# Patient Record
Sex: FEMALE | Race: WHITE | NOT HISPANIC OR LATINO | Employment: FULL TIME | URBAN - METROPOLITAN AREA
[De-identification: names, ages, dates, MRNs, and addresses within clinical notes are randomized per-mention and may not be internally consistent; named-entity substitution may affect disease eponyms.]

---

## 2016-12-26 LAB
ALT SERPL-CCNC: 62 IU/L (ref 5–35)
AST SERPL-CCNC: 38 U/L (ref 5–34)
CREAT SERPL-MCNC: 0.8 MG/DL (ref 0.5–1.3)

## 2017-01-05 DIAGNOSIS — E03.4 HYPOTHYROIDISM DUE TO ACQUIRED ATROPHY OF THYROID: Primary | ICD-10-CM

## 2017-01-05 RX ORDER — LEVOTHYROXINE SODIUM 137 UG/1
137 TABLET ORAL DAILY
Qty: 30 TABLET | Refills: 1 | Status: SHIPPED | OUTPATIENT
Start: 2017-01-05 | End: 2017-03-03

## 2017-02-08 ENCOUNTER — OFFICE VISIT (OUTPATIENT)
Dept: OBGYN | Facility: CLINIC | Age: 57
End: 2017-02-08
Payer: COMMERCIAL

## 2017-02-08 VITALS
BODY MASS INDEX: 34.99 KG/M2 | DIASTOLIC BLOOD PRESSURE: 88 MMHG | SYSTOLIC BLOOD PRESSURE: 154 MMHG | HEIGHT: 65 IN | WEIGHT: 210 LBS

## 2017-02-08 DIAGNOSIS — B00.9 HSV INFECTION: Primary | ICD-10-CM

## 2017-02-08 PROCEDURE — 86695 HERPES SIMPLEX TYPE 1 TEST: CPT | Performed by: OBSTETRICS & GYNECOLOGY

## 2017-02-08 PROCEDURE — 99202 OFFICE O/P NEW SF 15 MIN: CPT | Performed by: OBSTETRICS & GYNECOLOGY

## 2017-02-08 PROCEDURE — 86696 HERPES SIMPLEX TYPE 2 TEST: CPT | Performed by: OBSTETRICS & GYNECOLOGY

## 2017-02-08 PROCEDURE — 36415 COLL VENOUS BLD VENIPUNCTURE: CPT | Performed by: OBSTETRICS & GYNECOLOGY

## 2017-02-08 PROCEDURE — 87529 HSV DNA AMP PROBE: CPT | Performed by: OBSTETRICS & GYNECOLOGY

## 2017-02-08 RX ORDER — VALACYCLOVIR HYDROCHLORIDE 500 MG/1
500 TABLET, FILM COATED ORAL 2 TIMES DAILY
Qty: 6 TABLET | Refills: 3 | Status: SHIPPED | OUTPATIENT
Start: 2017-02-08 | End: 2017-07-28

## 2017-02-08 ASSESSMENT — ANXIETY QUESTIONNAIRES
2. NOT BEING ABLE TO STOP OR CONTROL WORRYING: NOT AT ALL
GAD7 TOTAL SCORE: 0
6. BECOMING EASILY ANNOYED OR IRRITABLE: NOT AT ALL
1. FEELING NERVOUS, ANXIOUS, OR ON EDGE: NOT AT ALL
5. BEING SO RESTLESS THAT IT IS HARD TO SIT STILL: NOT AT ALL
3. WORRYING TOO MUCH ABOUT DIFFERENT THINGS: NOT AT ALL
IF YOU CHECKED OFF ANY PROBLEMS ON THIS QUESTIONNAIRE, HOW DIFFICULT HAVE THESE PROBLEMS MADE IT FOR YOU TO DO YOUR WORK, TAKE CARE OF THINGS AT HOME, OR GET ALONG WITH OTHER PEOPLE: NOT DIFFICULT AT ALL
7. FEELING AFRAID AS IF SOMETHING AWFUL MIGHT HAPPEN: NOT AT ALL

## 2017-02-08 ASSESSMENT — PATIENT HEALTH QUESTIONNAIRE - PHQ9: 5. POOR APPETITE OR OVEREATING: NOT AT ALL

## 2017-02-08 NOTE — PROGRESS NOTES
SUBJECTIVE:                                                   Cherry Kim is a 56 year old female who presents to clinic today for the following health issue(s):  Patient presents with:  Vaginal Problem: lichen sclerosis, has blister that she noticed 1 week ago        HPI:  Patient has 5 day history of painful blisters left lower vulva  Similar lesion 3 yrs ago   No known history of hsv  Chart review doesn't show hsv test  Classic hsv lesion on exam  Sent pcr and antibodies  Prescription valtrex    Has connective tissue disease    Clobetasol for lichen sclerosis in past doesn't have typical lesions for that right now    Patient's last menstrual period was 2007..   Patient is sexually active, .  Using menopause for contraception.    reports that she has never smoked. She has never used smokeless tobacco.    STD testing offered?  Declined    Health maintenance updated:  yes    Today's PHQ-2 Score:   PHQ-2 (  [employer]) 2016   Q1: Little interest or pleasure in doing things 0   Q2: Feeling down, depressed or hopeless 0   PHQ-2 Score 0     Today's PHQ-9 Score:   PHQ-9 SCORE 2017   Total Score 0     Today's GAIL-7 Score:   GAIL-7 SCORE 2017   Total Score 0       Problem list and histories reviewed & adjusted, as indicated.  Additional history: as documented.    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.0)     Obesity     Other specified disorder of rectum and anus     Hemorrhoids     Health Care Home     Connective tissue disorder (H)     Impaired fasting glucose     Hyperlipidemia LDL goal <130     Hallux abducto valgus     Past Surgical History   Procedure Laterality Date     C nonspecific procedure       Tosillectomy     C nonspecific procedure       rectal abcess     Cryotherapy, cervical       1980s      Hc reconstr nose+tania septal repair              Social History   Substance Use Topics     Smoking status: Never Smoker      Smokeless tobacco: Never Used     Alcohol Use: Yes      Comment: 2-3 times per week      Problem (# of Occurrences) Relation (Name,Age of Onset)    Breast Cancer (1) Mother: At 55 yrs    C.A.D. (1) Mother:  of CHF,  at 58yrs of ? MI    CANCER (6) Father: Lung Cancer, Brother: brain cancer  , Maternal Aunt: vaginal, Maternal Aunt: cervical, Maternal Aunt: Rectal cancer, Maternal Grandfather: Stomach cancer in his 40s    Connective Tissue Disorder (1) Mother: Lupus    DIABETES (1) Mother: Mid forties    Hypertension (1) Mother    Respiratory (2) Father: d. 79 from complications of pneumonia, Brother: COPD- Lung transplant               Current Outpatient Prescriptions   Medication Sig     valACYclovir (VALTREX) 500 MG tablet Take 1 tablet (500 mg) by mouth 2 times daily     levothyroxine (SYNTHROID/LEVOTHROID) 137 MCG tablet Take 1 tablet (137 mcg) by mouth daily     hydrochlorothiazide (HYDRODIURIL) 25 MG tablet Take 1 tablet (25 mg) by mouth every morning     simvastatin (ZOCOR) 40 MG tablet Take 1 tablet (40 mg) by mouth At Bedtime     metoprolol (LOPRESSOR) 50 MG tablet Take 1 tablet (50 mg) by mouth 2 times daily     clobetasol (TEMOVATE) 0.05 % cream Apply sparingly to affected area twice daily for 14 days.  Do not apply to face.     lidocaine (XYLOCAINE) 5 % ointment      Vaginal Lubricant (REPLENS) GEL Place vaginally as needed     diclofenac (VOLTAREN) 75 MG EC tablet Take 75 mg by mouth. Takes prn     omeprazole 20 MG tablet Take 1 tablet by mouth daily. Take 30-60 minutes before a meal.     Iodoquinol-HC (HYDROCORTISONE-IODOQUINOL) 1-1 % CREA Externally apply  topically daily as needed.     HYDROXYCHLOROQUINE SULFATE 200 MG PO TABS 2 TABLET DAILY     CO Q-10 100 MG OR CAPS 1 daily     ASPIRIN 81 MG OR TABS ONE DAILY     EPINEPHrine (EPIPEN 2-ADRIAN) 0.3 MG/0.3ML  "injection Inject 0.3 mLs (0.3 mg) into the muscle once as needed for anaphylaxis     No current facility-administered medications for this visit.     Allergies   Allergen Reactions     Ciprofloxacin Diarrhea     Codeine      severe nausea     Lisinopril Anaphylaxis     angioedema     Shellfish Allergy Nausea and Vomiting     Hives on neck     Sulfa Drugs      hives       ROS:  Genitourinary: Painful [place], Painful Urination, Vaginal Dryness, Vaginal Itching and New skin lesions    OBJECTIVE:     /88 mmHg  Ht 5' 4.5\" (1.638 m)  Wt 210 lb (95.255 kg)  BMI 35.50 kg/m2  LMP 06/27/2007  Body mass index is 35.5 kg/(m^2).    Exam:  Constitutional:  Appearance: Well nourished, well developed alert, in no acute distress  Neurologic/Psychiatric:  Mental Status:  Oriented X3   Pelvic Exam:  External Genitalia:     Normal appearance for age, no discharge present, no tenderness present, no inflammatory lesions present, color normal  Vagina:     Normal vaginal vault without central or paravaginal defects, no discharge present, no inflammatory lesions present, no masses present  Bladder:     Nontender to palpation  Urethra:   Urethral Body:  Urethra palpation normal, urethra structural support normal   Urethral Meatus:  No erythema or lesions present  Cervix:     Appearance healthy, no lesions present, nontender to palpation, no bleeding present  Uterus:     Nontender to palpation, no masses present, position anteflexed, mobility: normal  Adnexa:     No adnexal tenderness present, no adnexal masses present  Perineum:     Perineum within normal limits, no evidence of trauma, no rashes or skin lesions present  Anus:     Anus within normal limits, no hemorrhoids present  Inguinal Lymph Nodes:     No lymphadenopathy present  Pubic Hair:     Normal pubic hair distribution for age  Genitalia and Groin:     No rashes present, no lesions present, no areas of discoloration, no masses present     In-Clinic Test Results:  No " results found for this or any previous visit (from the past 24 hour(s)).    ASSESSMENT/PLAN:                                                        ICD-10-CM    1. HSV infection B00.9 valACYclovir (VALTREX) 500 MG tablet       There are no Patient Instructions on file for this visit.    Valtrex prescription  Time 20 minute  Face to face   Discussed herpes and testing    Romina Lopes MD  [facility]

## 2017-02-09 ASSESSMENT — ANXIETY QUESTIONNAIRES: GAD7 TOTAL SCORE: 0

## 2017-02-09 ASSESSMENT — PATIENT HEALTH QUESTIONNAIRE - PHQ9: SUM OF ALL RESPONSES TO PHQ QUESTIONS 1-9: 0

## 2017-02-10 LAB
HSV1 DNA SPEC QL NAA+PROBE: NEGATIVE
HSV2 DNA SPEC QL NAA+PROBE: ABNORMAL
SPECIMEN SOURCE: ABNORMAL

## 2017-02-13 LAB
HSV1 IGG SERPL QL IA: 0.2 AI (ref 0–0.8)
HSV2 IGG SERPL QL IA: ABNORMAL AI (ref 0–0.8)

## 2017-03-03 DIAGNOSIS — E03.4 HYPOTHYROIDISM DUE TO ACQUIRED ATROPHY OF THYROID: ICD-10-CM

## 2017-03-03 RX ORDER — LEVOTHYROXINE SODIUM 137 UG/1
137 TABLET ORAL DAILY
Qty: 30 TABLET | Refills: 8 | Status: SHIPPED | OUTPATIENT
Start: 2017-03-03 | End: 2017-04-14

## 2017-03-03 NOTE — TELEPHONE ENCOUNTER
levothyroxin 137mcg     Last Written Prescription Date: 01/05/17  Last Quantity: 30, # refills: 1  Last Office Visit with G, P or [facility] prescribing provider: 11/29/16        TSH   Date Value Ref Range Status   12/10/2016 0.62 0.40 - 4.00 mU/L Final

## 2017-03-12 DIAGNOSIS — I10 ESSENTIAL HYPERTENSION, BENIGN: ICD-10-CM

## 2017-03-12 NOTE — TELEPHONE ENCOUNTER
metoprolol (LOPRESSOR) 50 MG tablet      Last Written Prescription Date: 09/12/16  Last Fill Quantity: 180 tab, # refills: 1    Last Office Visit with [facility], [facility] or [facility] prescribing provider:  02/04/16   Future Office Visit:        BP Readings from Last 3 Encounters:   02/08/17 154/88   02/04/16 130/84   01/15/15 122/80

## 2017-03-13 RX ORDER — METOPROLOL TARTRATE 50 MG
50 TABLET ORAL 2 TIMES DAILY
Qty: 60 TABLET | Refills: 0 | Status: SHIPPED | OUTPATIENT
Start: 2017-03-13 | End: 2017-04-14

## 2017-04-14 ENCOUNTER — OFFICE VISIT (OUTPATIENT)
Dept: INTERNAL MEDICINE | Facility: CLINIC | Age: 57
End: 2017-04-14
Payer: COMMERCIAL

## 2017-04-14 VITALS
HEART RATE: 73 BPM | SYSTOLIC BLOOD PRESSURE: 122 MMHG | OXYGEN SATURATION: 96 % | BODY MASS INDEX: 35.44 KG/M2 | DIASTOLIC BLOOD PRESSURE: 84 MMHG | WEIGHT: 209.7 LBS | TEMPERATURE: 97.8 F

## 2017-04-14 DIAGNOSIS — Z91.013 CRUSTACEAN ALLERGY: ICD-10-CM

## 2017-04-14 DIAGNOSIS — I10 ESSENTIAL HYPERTENSION, BENIGN: Primary | ICD-10-CM

## 2017-04-14 DIAGNOSIS — R80.9 PROTEINURIA: ICD-10-CM

## 2017-04-14 DIAGNOSIS — Z71.89 ADVANCED DIRECTIVES, COUNSELING/DISCUSSION: Chronic | ICD-10-CM

## 2017-04-14 DIAGNOSIS — E03.4 HYPOTHYROIDISM DUE TO ACQUIRED ATROPHY OF THYROID: ICD-10-CM

## 2017-04-14 DIAGNOSIS — Z11.59 NEED FOR HEPATITIS C SCREENING TEST: ICD-10-CM

## 2017-04-14 DIAGNOSIS — Z12.31 VISIT FOR SCREENING MAMMOGRAM: ICD-10-CM

## 2017-04-14 DIAGNOSIS — E78.5 HYPERLIPIDEMIA LDL GOAL <130: ICD-10-CM

## 2017-04-14 LAB
CHOLEST SERPL-MCNC: 153 MG/DL
HDLC SERPL-MCNC: 51 MG/DL
LDLC SERPL CALC-MCNC: 87 MG/DL
NONHDLC SERPL-MCNC: 102 MG/DL
PROT UR-MCNC: 0.09 G/L
PROT/CREAT 24H UR: 0.33 G/G CR (ref 0–0.2)
TRIGL SERPL-MCNC: 75 MG/DL

## 2017-04-14 PROCEDURE — 80061 LIPID PANEL: CPT | Performed by: INTERNAL MEDICINE

## 2017-04-14 PROCEDURE — 36415 COLL VENOUS BLD VENIPUNCTURE: CPT | Performed by: INTERNAL MEDICINE

## 2017-04-14 PROCEDURE — 99214 OFFICE O/P EST MOD 30 MIN: CPT | Performed by: INTERNAL MEDICINE

## 2017-04-14 PROCEDURE — 84156 ASSAY OF PROTEIN URINE: CPT | Performed by: INTERNAL MEDICINE

## 2017-04-14 RX ORDER — EPINEPHRINE 0.3 MG/.3ML
0.3 INJECTION SUBCUTANEOUS PRN
Qty: 0.6 ML | Refills: 11 | Status: SHIPPED | OUTPATIENT
Start: 2017-04-14 | End: 2019-09-19

## 2017-04-14 NOTE — LETTER
[address] [address]  [phone]        November 3, 2017    Cherry Kim  [address] [place] [id number]              Dear Cherry Kim    This is to remind you that your NON-FASTING LABS are due.    You may call our office at [phone] to schedule an appointment.    Please disregard this notice if you have already had your labs drawn or made an appointment.        Sincerely,        Krish Shannon MD

## 2017-04-14 NOTE — PROGRESS NOTES
SUBJECTIVE:                                                    Cherry Kim is a 56 year old female who presents to clinic today for the following health issues:    Hypertension Follow-up  BP Readings from Last 3 Encounters:   04/14/17 122/84   02/08/17 154/88   02/04/16 130/84        Outpatient blood pressures are not being checked.    Low Salt Diet: no added salt       Amount of exercise or physical activity: None    Problems taking medications regularly: No    Medication side effects: none    Diet: regular (no restrictions)    Hyperlipidemia Follow-Up  Lab Results   Component Value Date    HDL 54 02/04/2016     02/04/2016    CHOL 183 02/04/2016    TRIG 103 02/04/2016          Rate your low fat/cholesterol diet?: good    Taking statin?  Yes, no muscle aches from statin    Other lipid medications/supplements?:  Fish oil/Omega 3, dose 1000 mg without side effects     Hypothyroidism Follow-up  TSH   Date Value Ref Range Status   12/10/2016 0.62 0.40 - 4.00 mU/L Final   ]     Since last visit, patient describes the following symptoms: Weight stable, no hair loss, no skin changes, no constipation, no loose stools       Amount of exercise or physical activity: None    Problems taking medications regularly: No    Medication side effects: none    Diet: regular (no restrictions)    Problem list and histories reviewed & adjusted, as indicated.  Additional history: as documented    Problem list, Medication list, Allergies, and Medical/Social/Surgical histories reviewed in [facility] and updated as appropriate.    ROS:  Constitutional, HEENT, cardiovascular, pulmonary, gi and gu systems are negative, except as otherwise noted.    OBJECTIVE:                                                    /84  Pulse 73  Temp 97.8  F (36.6  C) (Oral)  Wt 209 lb 11.2 oz (95.1 kg)  LMP 06/27/2007  SpO2 96%  BMI 35.44 kg/m2  Body mass index is 35.44 kg/(m^2).  GENERAL APPEARANCE: alert, no distress and over  weight  HENT: nose and mouth without ulcers or lesions  NECK: no adenopathy, no asymmetry, masses, or scars and thyroid normal to palpation  RESP: lungs clear to auscultation - no rales, rhonchi or wheezes  CV: regular rates and rhythm, normal S1 S2, no S3 or S4 and no murmur, click or rub    Diagnostic test results:  Results for orders placed or performed in visit on 04/14/17   Lipid Profile with reflex to direct LDL   Result Value Ref Range    Cholesterol 153 <200 mg/dL    Triglycerides 75 <150 mg/dL    HDL Cholesterol 51 >49 mg/dL    LDL Cholesterol Calculated 87 <100 mg/dL    Non HDL Cholesterol 102 <130 mg/dL   Protein  random urine   Result Value Ref Range    Protein Random Urine 0.09 g/L    Protein Total Urine g/gr Creatinine 0.33 (H) 0 - 0.2 g/g Cr         ASSESSMENT/PLAN:                                                    1. Essential hypertension, benign  - metoprolol (LOPRESSOR) 50 MG tablet; Take 1 tablet (50 mg) by mouth 2 times daily  Dispense: 180 tablet; Refill: o  - Microalbumin quantitative, random urine  - Basic metabolic panel  - TSH with free T4 reflex  - Lipid Profile with reflex to direct LDL  - ALT  - T4 free    2. Hypothyroidism due to acquired atrophy of thyroid  At goal -continue present dose       3. Hyperlipidemia LDL goal <130  On statin - continue    4. Proteinuria  Present but stable. Has had angioedema from ACEi - so using these are not an option. GFR remains normal so focus will be on maintaining excellent BP control      Follow up with Provider - 6 mo      Krish Shannon MD  [facility]

## 2017-04-14 NOTE — MR AVS SNAPSHOT
After Visit Summary   4/14/2017    Cherry Kim    MRN: [id number]           Patient Information     Date Of Birth          1960        Visit Information        Provider Department      4/14/2017 10:20 AM Krish Shannon MD [facility]        Today's Diagnoses     Crustacean allergy    -  1    Advanced directives, counseling/discussion        Visit for screening mammogram        Hyperlipidemia LDL goal <130        Essential hypertension, benign           Follow-ups after your visit        Future tests that were ordered for you today     Open Future Orders        Priority Expected Expires Ordered    MA Screening Digital Bilateral Routine  4/14/2018 4/14/2017            Who to contact     If you have questions or need follow up information about today's clinic visit or your schedule please contact [facility] directly at [phone].  Normal or non-critical lab and imaging results will be communicated to you by MyChart, letter or phone within 4 business days after the clinic has received the results. If you do not hear from us within 7 days, please contact the clinic through [employer] or phone. If you have a critical or abnormal lab result, we will notify you by phone as soon as possible.  Submit refill requests through [employer] or call your pharmacy and they will forward the refill request to us. Please allow 3 business days for your refill to be completed.          Additional Information About Your Visit        MyChart Information     [employer] gives you secure access to your electronic health record. If you see a primary care provider, you can also send messages to your care team and make appointments. If you have questions, please call your primary care clinic.  If you do not have a primary care provider, please call [phone] and they will assist you.        Care EveryWhere ID     This is your Care EveryWhere ID. This could be  used by other organizations to access your [facility] medical records  [id number]        Your Vitals Were     Pulse Temperature Last Period Pulse Oximetry BMI (Body Mass Index)       73 97.8  F (36.6  C) (Oral) 06/27/2007 96% 35.44 kg/m2        Blood Pressure from Last 3 Encounters:   04/14/17 122/84   02/08/17 154/88   02/04/16 130/84    Weight from Last 3 Encounters:   04/14/17 209 lb 11.2 oz (95.1 kg)   02/08/17 210 lb (95.3 kg)   02/04/16 207 lb 1.6 oz (93.9 kg)              We Performed the Following     Lipid Profile with reflex to direct LDL          Today's Medication Changes          These changes are accurate as of: 4/14/17 10:49 AM.  If you have any questions, ask your nurse or doctor.               Start taking these medicines.        Dose/Directions    EPINEPHrine 0.3 MG/0.3ML injection   Used for:  Crustacean allergy   Replaces:  EPINEPHrine 0.3 MG/0.3ML injection   Started by:  Krish Shannon MD        Dose:  0.3 mg   Inject 0.3 mLs (0.3 mg) into the muscle as needed   Quantity:  0.6 mL   Refills:  11         These medicines have changed or have updated prescriptions.        Dose/Directions    valACYclovir 500 MG tablet   Commonly known as:  VALTREX   This may have changed:    - when to take this  - reasons to take this   Used for:  HSV infection        Dose:  500 mg   Take 1 tablet (500 mg) by mouth 2 times daily   Quantity:  6 tablet   Refills:  3         Stop taking these medicines if you haven't already. Please contact your care team if you have questions.     aspirin 81 MG tablet   Stopped by:  Krish Shannon MD           EPINEPHrine 0.3 MG/0.3ML injection   Commonly known as:  EPIPEN 2-ADRIAN   Replaced by:  EPINEPHrine 0.3 MG/0.3ML injection   Stopped by:  Krish Shannon MD           omeprazole 20 MG tablet   Stopped by:  Krish Shannon MD                Where to get your medicines      These medications were sent to [facility] [address] [place] [address]  [place]  [address] [facility] [address]     Phone:  [phone]     EPINEPHrine 0.3 MG/0.3ML injection                Primary Care Provider Office Phone # Fax #    Krish Shannon [phone]       [facility] [address]  [facility] [address]        Thank you!     Thank you for choosing [facility]  for your care. Our goal is always to provide you with excellent care. Hearing back from our patients is one way we can continue to improve our services. Please take a few minutes to complete the written survey that you may receive in the mail after your visit with us. Thank you!             Your Updated Medication List - Protect others around you: Learn how to safely use, store and throw away your medicines at www.disposemymeds.org.          This list is accurate as of: 4/14/17 10:49 AM.  Always use your most recent med list.                   Brand Name Dispense Instructions for use    clobetasol 0.05 % cream    TEMOVATE    30 g    Apply sparingly to affected area twice daily for 14 days.  Do not apply to face.       coenzyme Q-10 100 MG Caps      1 daily       diclofenac 75 MG EC tablet    VOLTAREN     Take 75 mg by mouth. Takes prn       EPINEPHrine 0.3 MG/0.3ML injection     0.6 mL    Inject 0.3 mLs (0.3 mg) into the muscle as needed       hydrochlorothiazide 25 MG tablet    HYDRODIURIL    90 tablet    Take 1 tablet (25 mg) by mouth every morning       Hydrocortisone-Iodoquinol 1-1 % Crea      Externally apply  topically daily as needed.       hydroxychloroquine 200 MG tablet    PLAQUENIL     2 TABLET DAILY       levothyroxine 137 MCG tablet    SYNTHROID/LEVOTHROID    30 tablet    Take 1 tablet (137 mcg) by mouth daily       lidocaine 5 % ointment    XYLOCAINE         metoprolol 50 MG tablet    LOPRESSOR    60 tablet    Take 1 tablet (50 mg) by mouth 2 times daily       replens Gel      Place vaginally as needed       simvastatin 40 MG tablet     ZOCOR    90 tablet    Take 1 tablet (40 mg) by mouth At Bedtime       valACYclovir 500 MG tablet    VALTREX    6 tablet    Take 1 tablet (500 mg) by mouth 2 times daily

## 2017-04-14 NOTE — NURSING NOTE
"Chief Complaint   Patient presents with     Hypertension       Initial /84  Pulse 73  Temp 97.8  F (36.6  C) (Oral)  Wt 209 lb 11.2 oz (95.1 kg)  LMP 06/27/2007  SpO2 96%  BMI 35.44 kg/m2 Estimated body mass index is 35.44 kg/(m^2) as calculated from the following:    Height as of 2/8/17: 5' 4.5\" (1.638 m).    Weight as of this encounter: 209 lb 11.2 oz (95.1 kg).  Medication Reconciliation: complete    "

## 2017-04-16 RX ORDER — SIMVASTATIN 40 MG
40 TABLET ORAL AT BEDTIME
Qty: 90 TABLET | Refills: 3 | Status: SHIPPED | OUTPATIENT
Start: 2017-04-16 | End: 2018-05-09

## 2017-04-16 RX ORDER — METOPROLOL TARTRATE 50 MG
50 TABLET ORAL 2 TIMES DAILY
Qty: 180 TABLET | Refills: 3 | Status: SHIPPED | OUTPATIENT
Start: 2017-04-16 | End: 2018-05-09

## 2017-04-16 RX ORDER — LEVOTHYROXINE SODIUM 137 UG/1
137 TABLET ORAL DAILY
Qty: 90 TABLET | Refills: 1 | Status: SHIPPED | OUTPATIENT
Start: 2017-04-16 | End: 2018-05-09

## 2017-04-16 RX ORDER — HYDROCHLOROTHIAZIDE 25 MG/1
25 TABLET ORAL EVERY MORNING
Qty: 90 TABLET | Refills: 3 | Status: SHIPPED | OUTPATIENT
Start: 2017-04-16 | End: 2018-05-09

## 2017-04-20 ENCOUNTER — TRANSFERRED RECORDS (OUTPATIENT)
Dept: HEALTH INFORMATION MANAGEMENT | Facility: CLINIC | Age: 57
End: 2017-04-20

## 2017-04-21 DIAGNOSIS — I10 ESSENTIAL HYPERTENSION, BENIGN: ICD-10-CM

## 2017-04-21 DIAGNOSIS — E78.5 HYPERLIPIDEMIA LDL GOAL <130: ICD-10-CM

## 2017-04-24 RX ORDER — HYDROCHLOROTHIAZIDE 25 MG/1
TABLET ORAL
Qty: 90 TABLET | Refills: 3 | Status: SHIPPED | OUTPATIENT
Start: 2017-04-24 | End: 2018-05-09

## 2017-04-24 RX ORDER — SIMVASTATIN 40 MG
TABLET ORAL
Qty: 90 TABLET | Refills: 3 | Status: SHIPPED | OUTPATIENT
Start: 2017-04-24 | End: 2018-05-09

## 2017-05-31 ENCOUNTER — HOSPITAL ENCOUNTER (OUTPATIENT)
Dept: MAMMOGRAPHY | Facility: CLINIC | Age: 57
Discharge: HOME | End: 2017-05-31
Admitting: RADIOLOGY
Payer: COMMERCIAL

## 2017-05-31 DIAGNOSIS — Z12.31 VISIT FOR SCREENING MAMMOGRAM: ICD-10-CM

## 2017-05-31 PROCEDURE — G0202 SCR MAMMO BI INCL CAD: HCPCS

## 2017-07-22 ENCOUNTER — HEALTH MAINTENANCE LETTER (OUTPATIENT)
Age: 57
End: 2017-07-22

## 2017-07-28 DIAGNOSIS — B00.9 HSV INFECTION: ICD-10-CM

## 2017-07-28 RX ORDER — VALACYCLOVIR HYDROCHLORIDE 500 MG/1
TABLET, FILM COATED ORAL
Qty: 6 TABLET | Refills: 0 | Status: SHIPPED | OUTPATIENT
Start: 2017-07-28 | End: 2017-09-17

## 2017-07-28 NOTE — TELEPHONE ENCOUNTER
Pending Prescriptions:                       Disp   Refills    valACYclovir (VALTREX) 500 MG tablet [Pha*6 tabl*0            Sig: TAKE 1 TABLET BY MOUTH TWICE DAILY         Last Written Prescription Date: 2/8/17  Last Fill Quantity: 6, # refills: 3  Last Office Visit with [facility], [facility] or [facility] prescribing provider: 2/8/17  Next appointment: none        Creatinine   Date Value Ref Range Status   12/29/2016 0.800 0.500 - 1.300 mg/dL Final

## 2017-07-28 NOTE — TELEPHONE ENCOUNTER
Pending Prescriptions:                       Disp   Refills    valACYclovir (VALTREX) 500 MG tablet [Pha*6 tabl*0            Sig: TAKE 1 TABLET BY MOUTH TWICE DAILY       Last Written Prescription Date: 2/8/17  Last Fill Quantity: 6, # refills: 3  Last Office Visit with LEISA, P or [facility] prescribing provider: 2/8/17    RTC: None    Creatinine   Date Value Ref Range Status   12/29/2016 0.800 0.500 - 1.300 mg/dL Final

## 2017-09-17 ENCOUNTER — TELEPHONE (OUTPATIENT)
Dept: OBGYN | Facility: CLINIC | Age: 57
End: 2017-09-17

## 2017-09-17 DIAGNOSIS — B00.9 HSV INFECTION: ICD-10-CM

## 2017-09-18 RX ORDER — VALACYCLOVIR HYDROCHLORIDE 500 MG/1
TABLET, FILM COATED ORAL
Qty: 90 TABLET | Refills: 3 | Status: SHIPPED | OUTPATIENT
Start: 2017-09-18 | End: 2018-07-31

## 2017-09-18 NOTE — TELEPHONE ENCOUNTER
Ok to start supression 500mg po daily since frequent outbreaks  She already tolerates this med so I don't expect any different side effects with daily use

## 2017-09-18 NOTE — TELEPHONE ENCOUNTER
valACYclovir (VALTREX) 500 MG tablet  Last Written Prescription Date:  7/28/17  Last Fill Quantity: 6,   # refills: 0  Last Office Visit with G primary care provider:  2/8/17  Future Office visit: none    Routing refill request to provider for review/approval because:  LMTCB to discuss with pt how often she needs to take med.

## 2017-09-18 NOTE — TELEPHONE ENCOUNTER
"Called pt and informed her of Dr. Lopes's message below and message with Dr. Lopes in person (Dr. Lopes indicated pt may get a headache, daily suppression helps keep the virus dormin/hidden). Pt is wanting to do daily suppression states she hates feeling like this. Asked her if she was referring to the outbreaks and she said \"Yes\" and that she is \"uncomfortable.\" States everything is fine emotionally. Informed pt to continue this episodic treatment and once this outbreak is gone to start taking daily. Pt voiced understanding of information. Informed that if we are prescribing Rx's for a pt we have to see them yearly meaning that she is due for appt in February. Pt voiced understanding. Dr. Lopes sent Rx to pharmacy for 90 tabs/3rf.      Closing encounter.  "

## 2017-09-18 NOTE — TELEPHONE ENCOUNTER
Called pt she states that she uses the Valtrex 500 mg BID for 3 days when she starts to feel a genital herpes outbreak coming. Pt gets about 1-2 outbreaks a month. Pt thinks she waited too long before starting the Valtrex for this current outbreak as she has blisters. She had four pills left and took her last pill this morning and is now out. Informed pt about daily suppression therapy. Pt wants to know what the drawbacks/adverse effects would be of her taking the Valtrex daily?   Routing to Dr. Lopes to review/advise.

## 2017-09-30 ENCOUNTER — HEALTH MAINTENANCE LETTER (OUTPATIENT)
Age: 57
End: 2017-09-30

## 2017-10-26 ENCOUNTER — TRANSFERRED RECORDS (OUTPATIENT)
Dept: HEALTH INFORMATION MANAGEMENT | Facility: CLINIC | Age: 57
End: 2017-10-26

## 2017-10-26 LAB
ALT SERPL-CCNC: 41 IU/L (ref 5–35)
AST SERPL-CCNC: 30 U/L (ref 5–34)
CREAT SERPL-MCNC: 0.58 MG/DL (ref 0.5–1.3)
GFR SERPL CREATININE-BSD FRML MDRD: 114.3 ML/MIN/1.73M2

## 2018-03-11 DIAGNOSIS — E03.4 HYPOTHYROIDISM DUE TO ACQUIRED ATROPHY OF THYROID: ICD-10-CM

## 2018-03-12 NOTE — TELEPHONE ENCOUNTER
Routing refill request to provider for review/approval because:  Labs not current:  TSH from 2016

## 2018-03-12 NOTE — TELEPHONE ENCOUNTER
"Requested Prescriptions   Pending Prescriptions Disp Refills     levothyroxine (SYNTHROID/LEVOTHROID) 137 MCG tablet [Pharmacy Med Name: LEVOTHYROXIN 137MCG TAB]  Last Written Prescription Date:  4/16/17  Last Fill Quantity: 90,  # refills: 1   Last office visit: 4/14/2017 with prescribing provider:  4/14/17   Future Office Visit:       30 tablet 8     Sig: TAKE ONE TABLET BY MOUTH ONCE DAILY    Thyroid Protocol Failed    3/11/2018 12:50 PM       Failed - Normal TSH on file in past 12 months    Recent Labs   Lab Test  12/10/16   1153   TSH  0.62             Passed - Patient is 12 years or older       Passed - Recent (12 mo) or future (30 days) visit within the authorizing provider's specialty    Patient had office visit in the last 12 months or has a visit in the next 30 days with authorizing provider or within the authorizing provider's specialty.  See \"Patient Info\" tab in inbasket, or \"Choose Columns\" in Meds & Orders section of the refill encounter.           Passed - No active pregnancy on record    If patient is pregnant or has had a positive pregnancy test, please check TSH.         Passed - No positive pregnancy test in past 12 months    If patient is pregnant or has had a positive pregnancy test, please check TSH.          "

## 2018-03-13 RX ORDER — LEVOTHYROXINE SODIUM 137 UG/1
137 TABLET ORAL DAILY
Qty: 30 TABLET | Refills: 0 | Status: SHIPPED | OUTPATIENT
Start: 2018-03-13 | End: 2018-04-08

## 2018-03-13 NOTE — TELEPHONE ENCOUNTER
1 month prescription sent electronically to the specified pharmacy.    Due for offic visit and labs April 2018

## 2018-04-08 DIAGNOSIS — E03.4 HYPOTHYROIDISM DUE TO ACQUIRED ATROPHY OF THYROID: ICD-10-CM

## 2018-04-09 NOTE — TELEPHONE ENCOUNTER
"Requested Prescriptions   Pending Prescriptions Disp Refills     levothyroxine (SYNTHROID/LEVOTHROID) 137 MCG tablet [Pharmacy Med Name: LEVOTHYROXIN 137MCG TAB] 30 tablet 0      Last Written Prescription Date:  03/13/18  Last Fill Quantity: 30,  # refills: 0   Last office visit: 4/14/2017 with prescribing provider:  04/14/17   Future Office Visit: 04/12/18  Next 5 appointments (look out 90 days)     Apr 12, 2018  8:00 AM CDT   [facility]Chart [facility] with Krish Shannon [facility]    [address] [id number] [phone]                Sig: TAKE 1 TABLET BY MOUTH ONCE DAILY    Thyroid Protocol Failed    4/8/2018  3:30 PM       Failed - Normal TSH on file in past 12 months    Recent Labs   Lab Test  12/10/16   1153   TSH  0.62             Passed - Patient is 12 years or older       Passed - Recent (12 mo) or future (30 days) visit within the authorizing provider's specialty    Patient had office visit in the last 12 months or has a visit in the next 30 days with authorizing provider or within the authorizing provider's specialty.  See \"Patient Info\" tab in inbasket, or \"Choose Columns\" in Meds & Orders section of the refill encounter.           Passed - No active pregnancy on record    If patient is pregnant or has had a positive pregnancy test, please check TSH.         Passed - No positive pregnancy test in past 12 months    If patient is pregnant or has had a positive pregnancy test, please check TSH.          "

## 2018-04-10 RX ORDER — LEVOTHYROXINE SODIUM 137 UG/1
TABLET ORAL
Qty: 30 TABLET | Refills: 0 | Status: SHIPPED | OUTPATIENT
Start: 2018-04-10 | End: 2018-05-09

## 2018-04-10 NOTE — TELEPHONE ENCOUNTER
Medication is being filled for 1 time refill only due to:  Patient needs to be seen because it has been more than one year since last visit.  Upcoming appt

## 2018-05-01 ENCOUNTER — TRANSFERRED RECORDS (OUTPATIENT)
Dept: HEALTH INFORMATION MANAGEMENT | Facility: CLINIC | Age: 58
End: 2018-05-01

## 2018-05-02 ENCOUNTER — TRANSFERRED RECORDS (OUTPATIENT)
Dept: HEALTH INFORMATION MANAGEMENT | Facility: CLINIC | Age: 58
End: 2018-05-02

## 2018-05-02 LAB
ALT SERPL-CCNC: 50 IU/L (ref 5–35)
AST SERPL-CCNC: 31 U/L (ref 5–34)
CREAT SERPL-MCNC: 0.62 MG/DL (ref 0.5–1.3)
GFR SERPL CREATININE-BSD FRML MDRD: 105.8 ML/MIN/1.73M2

## 2018-05-09 ENCOUNTER — MEDICAL CORRESPONDENCE (OUTPATIENT)
Dept: HEALTH INFORMATION MANAGEMENT | Facility: CLINIC | Age: 58
End: 2018-05-09

## 2018-05-09 ENCOUNTER — OFFICE VISIT (OUTPATIENT)
Dept: INTERNAL MEDICINE | Facility: CLINIC | Age: 58
End: 2018-05-09
Payer: COMMERCIAL

## 2018-05-09 VITALS
BODY MASS INDEX: 34.89 KG/M2 | DIASTOLIC BLOOD PRESSURE: 84 MMHG | HEART RATE: 62 BPM | RESPIRATION RATE: 16 BRPM | TEMPERATURE: 98 F | HEIGHT: 65 IN | WEIGHT: 209.4 LBS | SYSTOLIC BLOOD PRESSURE: 140 MMHG | OXYGEN SATURATION: 97 %

## 2018-05-09 DIAGNOSIS — E78.5 HYPERLIPIDEMIA LDL GOAL <130: ICD-10-CM

## 2018-05-09 DIAGNOSIS — R80.1 PERSISTENT PROTEINURIA: ICD-10-CM

## 2018-05-09 DIAGNOSIS — E03.4 HYPOTHYROIDISM DUE TO ACQUIRED ATROPHY OF THYROID: ICD-10-CM

## 2018-05-09 DIAGNOSIS — I10 ESSENTIAL HYPERTENSION, BENIGN: Primary | ICD-10-CM

## 2018-05-09 LAB
ANION GAP SERPL CALCULATED.3IONS-SCNC: 5 MMOL/L (ref 3–14)
BUN SERPL-MCNC: 14 MG/DL (ref 7–30)
CALCIUM SERPL-MCNC: 10.2 MG/DL (ref 8.5–10.1)
CHLORIDE SERPL-SCNC: 104 MMOL/L (ref 94–109)
CHOLEST SERPL-MCNC: 157 MG/DL
CO2 SERPL-SCNC: 32 MMOL/L (ref 20–32)
CREAT SERPL-MCNC: 0.68 MG/DL (ref 0.52–1.04)
GFR SERPL CREATININE-BSD FRML MDRD: 89 ML/MIN/1.7M2
GLUCOSE SERPL-MCNC: 104 MG/DL (ref 70–99)
HDLC SERPL-MCNC: 48 MG/DL
LDLC SERPL CALC-MCNC: 98 MG/DL
NONHDLC SERPL-MCNC: 109 MG/DL
POTASSIUM SERPL-SCNC: 4.1 MMOL/L (ref 3.4–5.3)
PROT UR-MCNC: 0.12 G/L
PROT/CREAT 24H UR: 0.3 G/G CR (ref 0–0.2)
SODIUM SERPL-SCNC: 141 MMOL/L (ref 133–144)
TRIGL SERPL-MCNC: 57 MG/DL
TSH SERPL DL<=0.005 MIU/L-ACNC: 0.69 MU/L (ref 0.4–4)

## 2018-05-09 PROCEDURE — 80048 BASIC METABOLIC PNL TOTAL CA: CPT | Performed by: INTERNAL MEDICINE

## 2018-05-09 PROCEDURE — 99214 OFFICE O/P EST MOD 30 MIN: CPT | Performed by: INTERNAL MEDICINE

## 2018-05-09 PROCEDURE — 84443 ASSAY THYROID STIM HORMONE: CPT | Performed by: INTERNAL MEDICINE

## 2018-05-09 PROCEDURE — 80061 LIPID PANEL: CPT | Performed by: INTERNAL MEDICINE

## 2018-05-09 PROCEDURE — 84156 ASSAY OF PROTEIN URINE: CPT | Performed by: INTERNAL MEDICINE

## 2018-05-09 PROCEDURE — 36415 COLL VENOUS BLD VENIPUNCTURE: CPT | Performed by: INTERNAL MEDICINE

## 2018-05-09 RX ORDER — LEVOTHYROXINE SODIUM 137 UG/1
137 TABLET ORAL DAILY
Qty: 90 TABLET | Refills: 3 | Status: SHIPPED | OUTPATIENT
Start: 2018-05-09 | End: 2019-04-23

## 2018-05-09 RX ORDER — SPIRONOLACTONE AND HYDROCHLOROTHIAZIDE 25; 25 MG/1; MG/1
1 TABLET ORAL DAILY
Qty: 90 TABLET | Refills: 0 | Status: SHIPPED | OUTPATIENT
Start: 2018-05-09 | End: 2018-08-05

## 2018-05-09 RX ORDER — SIMVASTATIN 40 MG
TABLET ORAL
Qty: 90 TABLET | Refills: 3 | Status: SHIPPED | OUTPATIENT
Start: 2018-05-09 | End: 2019-04-23

## 2018-05-09 RX ORDER — METOPROLOL TARTRATE 50 MG
50 TABLET ORAL 2 TIMES DAILY
Qty: 180 TABLET | Refills: 1 | Status: SHIPPED | OUTPATIENT
Start: 2018-05-09 | End: 2018-11-01

## 2018-05-09 RX ORDER — HYDROCHLOROTHIAZIDE 25 MG/1
TABLET ORAL
Qty: 90 TABLET | Refills: 3 | Status: CANCELLED | OUTPATIENT
Start: 2018-05-09

## 2018-05-09 RX ORDER — NAPROXEN 500 MG/1
TABLET ORAL
Refills: 0 | COMMUNITY
Start: 2018-02-19 | End: 2023-03-03

## 2018-05-09 NOTE — MR AVS SNAPSHOT
"              After Visit Summary   5/9/2018    Cherry Kim    MRN: [id number]           Patient Information     Date Of Birth          1960        Visit Information        Provider Department      5/9/2018 8:00 AM Krish Shannon MD [facility]        Today's Diagnoses     Essential hypertension, benign    -  1    Hypothyroidism due to acquired atrophy of thyroid        Hyperlipidemia LDL goal <130        Persistent proteinuria           Follow-ups after your visit        Who to contact     If you have questions or need follow up information about today's clinic visit or your schedule please contact [facility] directly at [phone].  Normal or non-critical lab and imaging results will be communicated to you by MyChart, letter or phone within 4 business days after the clinic has received the results. If you do not hear from us within 7 days, please contact the clinic through eSpace[employer] or phone. If you have a critical or abnormal lab result, we will notify you by phone as soon as possible.  Submit refill requests through [employer] or call your pharmacy and they will forward the refill request to us. Please allow 3 business days for your refill to be completed.          Additional Information About Your Visit        MyChart Information     [employer] gives you secure access to your electronic health record. If you see a primary care provider, you can also send messages to your care team and make appointments. If you have questions, please call your primary care clinic.  If you do not have a primary care provider, please call [phone] and they will assist you.        Care EveryWhere ID     This is your Care EveryWhere ID. This could be used by other organizations to access your [facility] medical records  [id number]        Your Vitals Were     Pulse Temperature Respirations Height Last Period Pulse Oximetry    62 98  F (36.7  C) (Oral) 16 5' 4.5\" " (1.638 m) 06/27/2007 97%    BMI (Body Mass Index)                   35.39 kg/m2            Blood Pressure from Last 3 Encounters:   05/09/18 132/84   04/14/17 122/84   02/08/17 154/88    Weight from Last 3 Encounters:   05/09/18 209 lb 6.4 oz (95 kg)   04/14/17 209 lb 11.2 oz (95.1 kg)   02/08/17 210 lb (95.3 kg)              We Performed the Following     Basic metabolic panel     Lipid panel reflex to direct LDL Fasting     Protein  random urine with Creat Ratio     TSH with free T4 reflex          Today's Medication Changes          These changes are accurate as of 5/9/18  8:50 AM.  If you have any questions, ask your nurse or doctor.               Start taking these medicines.        Dose/Directions    spironolactone-hydrochlorothiazide 25-25 MG per tablet   Commonly known as:  ALDACTAZIDE   Used for:  Essential hypertension, benign   Started by:  Krish Shannon MD        Dose:  1 tablet   Take 1 tablet by mouth daily   Quantity:  90 tablet   Refills:  0            Where to get your medicines      These medications were sent to [place] [address] [place] [facility] [address] [place] [address]  [address] [facility], [facility] [address]     Phone:  [phone]     metoprolol tartrate 50 MG tablet    spironolactone-hydrochlorothiazide 25-25 MG per tablet                Primary Care Provider Office Phone # Fax #    Krish Shannon [phone]       [address] [place] [address]        Equal Access to Services     [facility] RACHEL AH: Hadii ortega ku hadasho Soshonnaali, waaxda luqadaha, qaybta kaalmada adeegyada, waxay eleazar shipman. So [facility] [phone].    ATENCIÓN: Si habla español, tiene a denis disposición servicios gratuitos de asistencia lingüística. Llame al [phone].    We comply with applicable federal civil rights laws and [place] laws. We do not discriminate on the basis of race, color, national origin, age, disability, sex, sexual orientation, or gender  identity.            Thank you!     Thank you for choosing [facility]  for your care. Our goal is always to provide you with excellent care. Hearing back from our patients is one way we can continue to improve our services. Please take a few minutes to complete the written survey that you may receive in the mail after your visit with us. Thank you!             Your Updated Medication List - Protect others around you: Learn how to safely use, store and throw away your medicines at www.disposemymeds.org.          This list is accurate as of 5/9/18  8:50 AM.  Always use your most recent med list.                   Brand Name Dispense Instructions for use Diagnosis    coenzyme Q-10 100 MG Caps      1 daily        EPINEPHrine 0.3 MG/0.3ML injection 2-pack    EPIPEN/ADRENACLICK/or ANY BX GENERIC EQUIV    0.6 mL    Inject 0.3 mLs (0.3 mg) into the muscle as needed    Crustacean allergy       hydrochlorothiazide 25 MG tablet    HYDRODIURIL    90 tablet    Take 1 tablet by mouth  every morning    Essential hypertension, benign       Hydrocortisone-Iodoquinol 1-1 % Crea      Externally apply  topically daily as needed.        hydroxychloroquine 200 MG tablet    PLAQUENIL     2 TABLET DAILY        levothyroxine 137 MCG tablet    SYNTHROID/LEVOTHROID    30 tablet    TAKE 1 TABLET BY MOUTH ONCE DAILY    Hypothyroidism due to acquired atrophy of thyroid       metoprolol tartrate 50 MG tablet    LOPRESSOR    180 tablet    Take 1 tablet (50 mg) by mouth 2 times daily    Essential hypertension, benign       naproxen 500 MG tablet    NAPROSYN     TK 1 OR 2 TS PO QD PRN        replens Gel      Place vaginally as needed        simvastatin 40 MG tablet    ZOCOR    90 tablet    Take 1 tablet by mouth at  bedtime    Hyperlipidemia LDL goal <130       spironolactone-hydrochlorothiazide 25-25 MG per tablet    ALDACTAZIDE    90 tablet    Take 1 tablet by mouth daily    Essential hypertension, benign        valACYclovir 500 MG tablet    VALTREX    90 tablet    Take 1 tablet by mouth 2 times daily for 3 days per outbreak then switch to daily 500 mg for supression    HSV infection

## 2018-05-09 NOTE — LETTER
[address] [address]  [phone]        June 5, 2018    Cherry Kim  [address] [place] [id number]              Dear Cherry Kim    This is to remind you that your non-fasting labs is due.    You may call our office at [phone] to schedule an appointment.    Please disregard this notice if you have already had your labs drawn or made an appointment.        Sincerely,        Krish Shannon MD

## 2018-05-09 NOTE — PROGRESS NOTES
"  SUBJECTIVE:   Cherry Kim is a 57 year old female who presents to clinic today for the following health issues:      Hyperlipidemia Follow-Up      Rate your low fat/cholesterol diet?: good    Taking statin?  Yes, no muscle aches from statin    Other lipid medications/supplements?:  none    Hypertension Follow-up  BP Readings from Last 3 Encounters:   05/09/18 132/84   04/14/17 122/84   02/08/17 154/88        Outpatient blood pressures are not being checked.    Low Salt Diet: low salt    Hypothyroidism Follow-up  TSH   Date Value Ref Range Status   12/10/2016 0.62 0.40 - 4.00 mU/L Final   ]     Since last visit, patient describes the following symptoms: Weight stable, no hair loss, no skin changes, no constipation, no loose stools      Amount of exercise or physical activity: None    Problems taking medications regularly: No    Medication side effects: none    Diet: regular (no restrictions)    Problem list and histories reviewed & adjusted, as indicated.  Additional history: as documented    Labs reviewed in EPIC    Reviewed and updated as needed this visit by clinical staff  Allergies  Meds       Reviewed and updated as needed this visit by Provider         ROS:  Constitutional, HEENT, cardiovascular, pulmonary, gi and gu systems are negative, except as otherwise noted.    OBJECTIVE:                                                    /84  Pulse 62  Temp 98  F (36.7  C) (Oral)  Resp 16  Ht 5' 4.5\" (1.638 m)  Wt 209 lb 6.4 oz (95 kg)  LMP 06/27/2007  SpO2 97%  BMI 35.39 kg/m2  Body mass index is 35.39 kg/(m^2).  GENERAL APPEARANCE: healthy, alert and no distress      Diagnostic test results:  Diagnostic Test Results:  Results for orders placed or performed in visit on 05/09/18 (from the past 24 hour(s))   Basic metabolic panel   Result Value Ref Range    Sodium 141 133 - 144 mmol/L    Potassium 4.1 3.4 - 5.3 mmol/L    Chloride 104 94 - 109 mmol/L    Carbon Dioxide 32 20 - 32 mmol/L    " Anion Gap 5 3 - 14 mmol/L    Glucose 104 (H) 70 - 99 mg/dL    Urea Nitrogen 14 7 - 30 mg/dL    Creatinine 0.68 0.52 - 1.04 mg/dL    GFR Estimate 89 >60 mL/min/1.7m2    GFR Estimate If Black >90 >60 mL/min/1.7m2    Calcium 10.2 (H) 8.5 - 10.1 mg/dL   Lipid panel reflex to direct LDL Fasting   Result Value Ref Range    Cholesterol 157 <200 mg/dL    Triglycerides 57 <150 mg/dL    HDL Cholesterol 48 (L) >49 mg/dL    LDL Cholesterol Calculated 98 <100 mg/dL    Non HDL Cholesterol 109 <130 mg/dL   TSH with free T4 reflex   Result Value Ref Range    TSH 0.69 0.40 - 4.00 mU/L        ASSESSMENT/PLAN:                                                    1. Essential hypertension, benign  Add spironolactone.  Labs in 1-2 weeks follow-up with me in a month.  - metoprolol tartrate (LOPRESSOR) 50 MG tablet; Take 1 tablet (50 mg) by mouth 2 times daily  Dispense: 180 tablet; Refill: 1  - spironolactone-hydrochlorothiazide (ALDACTAZIDE) 25-25 MG per tablet; Take 1 tablet by mouth daily  Dispense: 90 tablet; Refill: 0  - Basic metabolic panel    2. Hypothyroidism due to acquired atrophy of thyroid  At goal continue present dose  - levothyroxine (SYNTHROID/LEVOTHROID) 137 MCG tablet; Take 1 tablet (137 mcg) by mouth daily  Dispense: 90 tablet; Refill: 1  - TSH with free T4 reflex    3. Hyperlipidemia LDL goal <130  At goal continue medication  - simvastatin (ZOCOR) 40 MG tablet; Take 1 tablet by mouth at  bedtime  Dispense: 90 tablet; Refill: 1  - Lipid panel reflex to direct LDL Fasting    4. Persistent proteinuria  Awaiting results  - Protein  random urine with Creat Ratio    Krish Shannon MD  [facility]

## 2018-05-13 ENCOUNTER — HEALTH MAINTENANCE LETTER (OUTPATIENT)
Age: 58
End: 2018-05-13

## 2018-05-15 ENCOUNTER — OFFICE VISIT (OUTPATIENT)
Dept: INTERNAL MEDICINE | Facility: CLINIC | Age: 58
End: 2018-05-15
Payer: COMMERCIAL

## 2018-05-15 VITALS
BODY MASS INDEX: 34.35 KG/M2 | OXYGEN SATURATION: 98 % | RESPIRATION RATE: 16 BRPM | TEMPERATURE: 97.9 F | HEART RATE: 81 BPM | SYSTOLIC BLOOD PRESSURE: 124 MMHG | DIASTOLIC BLOOD PRESSURE: 84 MMHG | HEIGHT: 65 IN | WEIGHT: 206.2 LBS

## 2018-05-15 DIAGNOSIS — K60.2 ANAL FISSURE: Primary | ICD-10-CM

## 2018-05-15 PROCEDURE — 46600 DIAGNOSTIC ANOSCOPY SPX: CPT | Performed by: INTERNAL MEDICINE

## 2018-05-15 NOTE — PROGRESS NOTES
"  SUBJECTIVE:   Cherry Kim is a 57 year old female who presents to clinic today for the following health issues:      Rectal pain w/defecation  Onset: 4 days    Description:   Pain: YES  Itching: no     Accompanying Signs & Symptoms:  Blood streaked toilet paper: no   Blood in stool: no   Changes in stool pattern: no     History:   Any previous GI studies done:none  Family History of colon cancer: Aunt had rectal cancer    Precipitating factors:   None    Alleviating factors:  None    Therapies Tried and outcome: preparation H    She has a history of hemorrhoids and symptoms are similar.  She does not palpate anything but did notice small amount of blood on toilet paper.    Problem list and histories reviewed & adjusted, as indicated.  Additional history: as documented    Labs reviewed in EPIC    Reviewed and updated as needed this visit by clinical staff  Allergies  Meds       Reviewed and updated as needed this visit by Provider         ROS:  Constitutional, HEENT, cardiovascular, pulmonary, gi and gu systems are negative, except as otherwise noted.    OBJECTIVE:                                                    /84  Pulse 81  Temp 97.9  F (36.6  C) (Oral)  Resp 16  Ht 5' 4.5\" (1.638 m)  Wt 206 lb 3.2 oz (93.5 kg)  LMP 06/27/2007  SpO2 98%  BMI 34.85 kg/m2  Body mass index is 34.85 kg/(m^2).  GENERAL APPEARANCE: alert, no distress and over weight  Rectal: Normal external examination.  Anoscopy reveals a fissure approximate 12:00.  There is some hemorrhoids but none that appear to be acutely bleeding.  Diagnostic test results:  none      ASSESSMENT/PLAN:                                                    1. Anal fissure  - Lidocaine 2 % GEL; Place 1 Dose rectally every 4 hours as needed  Dispense: 30 g; Refill: 0  - nifedipine 0.2% in white petrolatum 0.2 % OINT ointment; Place rectally every 6 hours for 28 days  Dispense: 45 g; Refill: 1  - ANOSCOPY W/WO BRUSH/WASH           Krish" MARCIANO Shannon MD  [facility]

## 2018-05-15 NOTE — MR AVS SNAPSHOT
"              After Visit Summary   5/15/2018    Cherry Kim    MRN: [id number]           Patient Information     Date Of Birth          1960        Visit Information        Provider Department      5/15/2018 8:40 AM Krish Shannon MD [facility]        Today's Diagnoses     Anal fissure    -  1       Follow-ups after your visit        Who to contact     If you have questions or need follow up information about today's clinic visit or your schedule please contact [facility] directly at [phone].  Normal or non-critical lab and imaging results will be communicated to you by [employer], letter or phone within 4 business days after the clinic has received the results. If you do not hear from us within 7 days, please contact the clinic through [employer] or phone. If you have a critical or abnormal lab result, we will notify you by phone as soon as possible.  Submit refill requests through [employer] or call your pharmacy and they will forward the refill request to us. Please allow 3 business days for your refill to be completed.          Additional Information About Your Visit        MyChart Information     [employer] gives you secure access to your electronic health record. If you see a primary care provider, you can also send messages to your care team and make appointments. If you have questions, please call your primary care clinic.  If you do not have a primary care provider, please call [phone] and they will assist you.        Care EveryWhere ID     This is your Care EveryWhere ID. This could be used by other organizations to access your [facility] medical records  [id number]        Your Vitals Were     Pulse Temperature Respirations Height Last Period Pulse Oximetry    81 97.9  F (36.6  C) (Oral) 16 5' 4.5\" (1.638 m) 06/27/2007 98%    BMI (Body Mass Index)                   34.85 kg/m2            Blood Pressure from Last 3 Encounters: "   05/15/18 124/84   05/09/18 140/84   04/14/17 122/84    Weight from Last 3 Encounters:   05/15/18 206 lb 3.2 oz (93.5 kg)   05/09/18 209 lb 6.4 oz (95 kg)   04/14/17 209 lb 11.2 oz (95.1 kg)              We Performed the Following     ANOSCOPY W/WO BRUSH/WASH          Today's Medication Changes          These changes are accurate as of 5/15/18 12:36 PM.  If you have any questions, ask your nurse or doctor.               Start taking these medicines.        Dose/Directions    Lidocaine 2 % Gel   Used for:  Anal fissure   Started by:  Krish Shannon MD        Dose:  1 Dose   Place 1 Dose rectally every 4 hours as needed   Quantity:  30 g   Refills:  0       nifedipine 0.2% in white petrolatum 0.2 % Oint ointment   Used for:  Anal fissure   Started by:  Krish Shannon MD        Place rectally every 6 hours for 28 days   Quantity:  45 g   Refills:  1            Where to get your medicines      These medications were sent to [address]  [address] [address]     Phone:  [phone]     nifedipine 0.2% in white petrolatum 0.2 % Oint ointment         Some of these will need a paper prescription and others can be bought over the counter.  Ask your nurse if you have questions.     Bring a paper prescription for each of these medications     Lidocaine 2 % Gel                Primary Care Provider Office Phone # Fax #    Krish Shannon [phone]       [address] [place] [phone]        Equal Access to Services     MICHAEL RAMOS AH: Puneet Pickard, warudolphda luqadaha, qaybta kaalmaida dalton. So [facility] [phone].    ATENCIÓN: Si habla español, tiene a denis disposición servicios gratuitos de asistencia lingüística. Miguel al [phone].    We comply with applicable federal civil rights laws and [place] laws. We do not discriminate on the basis of race, color,  national origin, age, disability, sex, sexual orientation, or gender identity.            Thank you!     Thank you for choosing [facility]  for your care. Our goal is always to provide you with excellent care. Hearing back from our patients is one way we can continue to improve our services. Please take a few minutes to complete the written survey that you may receive in the mail after your visit with us. Thank you!             Your Updated Medication List - Protect others around you: Learn how to safely use, store and throw away your medicines at www.disposemymeds.org.          This list is accurate as of 5/15/18 12:36 PM.  Always use your most recent med list.                   Brand Name Dispense Instructions for use Diagnosis    coenzyme Q-10 100 MG Caps      1 daily        EPINEPHrine 0.3 MG/0.3ML injection 2-pack    EPIPEN/ADRENACLICK/or ANY BX GENERIC EQUIV    0.6 mL    Inject 0.3 mLs (0.3 mg) into the muscle as needed    Crustacean allergy       hydrochlorothiazide 25 MG tablet    HYDRODIURIL    90 tablet    Take 1 tablet by mouth  every morning    Essential hypertension, benign       Hydrocortisone-Iodoquinol 1-1 % Crea      Externally apply  topically daily as needed.        hydroxychloroquine 200 MG tablet    PLAQUENIL     2 TABLET DAILY        levothyroxine 137 MCG tablet    SYNTHROID/LEVOTHROID    90 tablet    Take 1 tablet (137 mcg) by mouth daily    Hypothyroidism due to acquired atrophy of thyroid       Lidocaine 2 % Gel     30 g    Place 1 Dose rectally every 4 hours as needed    Anal fissure       metoprolol tartrate 50 MG tablet    LOPRESSOR    180 tablet    Take 1 tablet (50 mg) by mouth 2 times daily    Essential hypertension, benign       naproxen 500 MG tablet    NAPROSYN     TK 1 OR 2 TS PO QD PRN        nifedipine 0.2% in white petrolatum 0.2 % Oint ointment     45 g    Place rectally every 6 hours for 28 days    Anal fissure       replens Gel      Place vaginally as  needed        simvastatin 40 MG tablet    ZOCOR    90 tablet    Take 1 tablet by mouth at  bedtime    Hyperlipidemia LDL goal <130       spironolactone-hydrochlorothiazide 25-25 MG per tablet    ALDACTAZIDE    90 tablet    Take 1 tablet by mouth daily    Essential hypertension, benign       valACYclovir 500 MG tablet    VALTREX    90 tablet    Take 1 tablet by mouth 2 times daily for 3 days per outbreak then switch to daily 500 mg for supression    HSV infection

## 2018-05-17 ENCOUNTER — MYC MEDICAL ADVICE (OUTPATIENT)
Dept: INTERNAL MEDICINE | Facility: CLINIC | Age: 58
End: 2018-05-17

## 2018-05-17 NOTE — LETTER
May 21, 2018      Cherry Kim  [address] [place] [phone]        To Whom It May Concern:    Cherry Kim was seen in our clinic. She may return to work with the following accommodations: For the next 4-6 weeks Cherry may have significant amount of discomfort with sitting for prolonged periods of time.  Due to this I recommend a standing desk or adjustable sit/stand desk to accommodate her.      Sincerely,        Krish Shannon MD

## 2018-07-10 ENCOUNTER — TELEPHONE (OUTPATIENT)
Dept: LAB | Facility: CLINIC | Age: 58
End: 2018-07-10

## 2018-07-10 NOTE — TELEPHONE ENCOUNTER
Called and informed patient of overdue labs and stressed the importance of coming in to get those checked. Patient was at work and unable to schedule with me today. Stated she will try to schedule one for next week.  Brinda Aguirre, NORA on 7/10/2018 at 1:46 PM

## 2018-07-19 DIAGNOSIS — I10 ESSENTIAL HYPERTENSION, BENIGN: ICD-10-CM

## 2018-07-19 PROCEDURE — 80048 BASIC METABOLIC PNL TOTAL CA: CPT | Performed by: INTERNAL MEDICINE

## 2018-07-19 PROCEDURE — 36415 COLL VENOUS BLD VENIPUNCTURE: CPT | Performed by: INTERNAL MEDICINE

## 2018-07-20 LAB
ANION GAP SERPL CALCULATED.3IONS-SCNC: 7 MMOL/L (ref 3–14)
BUN SERPL-MCNC: 12 MG/DL (ref 7–30)
CALCIUM SERPL-MCNC: 9.1 MG/DL (ref 8.5–10.1)
CHLORIDE SERPL-SCNC: 102 MMOL/L (ref 94–109)
CO2 SERPL-SCNC: 30 MMOL/L (ref 20–32)
CREAT SERPL-MCNC: 0.74 MG/DL (ref 0.52–1.04)
GFR SERPL CREATININE-BSD FRML MDRD: 81 ML/MIN/1.7M2
GLUCOSE SERPL-MCNC: 76 MG/DL (ref 70–99)
POTASSIUM SERPL-SCNC: 3.6 MMOL/L (ref 3.4–5.3)
SODIUM SERPL-SCNC: 139 MMOL/L (ref 133–144)

## 2018-07-31 ENCOUNTER — OFFICE VISIT (OUTPATIENT)
Dept: OBGYN | Facility: CLINIC | Age: 58
End: 2018-07-31
Payer: COMMERCIAL

## 2018-07-31 VITALS
SYSTOLIC BLOOD PRESSURE: 128 MMHG | HEIGHT: 65 IN | BODY MASS INDEX: 34.16 KG/M2 | WEIGHT: 205 LBS | DIASTOLIC BLOOD PRESSURE: 80 MMHG

## 2018-07-31 DIAGNOSIS — Z01.419 ENCOUNTER FOR GYNECOLOGICAL EXAMINATION WITHOUT ABNORMAL FINDING: Primary | ICD-10-CM

## 2018-07-31 DIAGNOSIS — B00.9 HSV INFECTION: ICD-10-CM

## 2018-07-31 PROCEDURE — 99396 PREV VISIT EST AGE 40-64: CPT | Performed by: OBSTETRICS & GYNECOLOGY

## 2018-07-31 PROCEDURE — G0145 SCR C/V CYTO,THINLAYER,RESCR: HCPCS | Performed by: OBSTETRICS & GYNECOLOGY

## 2018-07-31 PROCEDURE — 87624 HPV HI-RISK TYP POOLED RSLT: CPT | Performed by: OBSTETRICS & GYNECOLOGY

## 2018-07-31 RX ORDER — VALACYCLOVIR HYDROCHLORIDE 500 MG/1
TABLET, FILM COATED ORAL
Qty: 90 TABLET | Refills: 3 | Status: CANCELLED | OUTPATIENT
Start: 2018-07-31

## 2018-07-31 RX ORDER — VALACYCLOVIR HYDROCHLORIDE 500 MG/1
500 TABLET, FILM COATED ORAL DAILY
Qty: 90 TABLET | Refills: 3 | Status: SHIPPED | OUTPATIENT
Start: 2018-07-31 | End: 2019-08-07

## 2018-07-31 ASSESSMENT — ANXIETY QUESTIONNAIRES
IF YOU CHECKED OFF ANY PROBLEMS ON THIS QUESTIONNAIRE, HOW DIFFICULT HAVE THESE PROBLEMS MADE IT FOR YOU TO DO YOUR WORK, TAKE CARE OF THINGS AT HOME, OR GET ALONG WITH OTHER PEOPLE: NOT DIFFICULT AT ALL
1. FEELING NERVOUS, ANXIOUS, OR ON EDGE: NOT AT ALL
7. FEELING AFRAID AS IF SOMETHING AWFUL MIGHT HAPPEN: NOT AT ALL
6. BECOMING EASILY ANNOYED OR IRRITABLE: NOT AT ALL
2. NOT BEING ABLE TO STOP OR CONTROL WORRYING: NOT AT ALL
GAD7 TOTAL SCORE: 0
3. WORRYING TOO MUCH ABOUT DIFFERENT THINGS: NOT AT ALL
5. BEING SO RESTLESS THAT IT IS HARD TO SIT STILL: NOT AT ALL

## 2018-07-31 ASSESSMENT — PATIENT HEALTH QUESTIONNAIRE - PHQ9: 5. POOR APPETITE OR OVEREATING: NOT AT ALL

## 2018-07-31 NOTE — MR AVS SNAPSHOT
"              After Visit Summary   7/31/2018    Cherry Kim    MRN: [id number]           Patient Information     Date Of Birth          1960        Visit Information        Provider Department      7/31/2018 4:10 PM Romina Lopes MD [facility]        Today's Diagnoses     Encounter for gynecological examination without abnormal finding    -  1    HSV infection           Follow-ups after your visit        Your next 10 appointments already scheduled     Sep 06, 2018  8:15 AM CDT   MA SCREENING DIGITAL BILATERAL with WEMA1   [facility] ([facility]    [address], [address]  [facility] [address] [phone]           Do not use any powder, lotion or deodorant under your arms or on your breast. If you do, we will ask you to remove it before your exam.  Wear comfortable, two-piece clothing.  If you have any allergies, tell your care team.  Bring any previous mammograms from other facilities or have them mailed to the breast center. Three-dimensional (3D) mammograms are available at [facility] locations in [facility], [facility], [facility], and [place]. [facility] locations include [place] and [facility] in [place]. Benefits of 3D mammograms include: - Improved rate of cancer detection - Decreases your chance of having to go back for more tests, which means fewer: - \"False-positive\" results (This means that there is an abnormal area but it isn't cancer.) - Invasive testing procedures, such as a biopsy or surgery - Can provide clearer images of the breast if you have dense breast tissue. 3D mammography is an optional exam that anyone can have with a 2D mammogram. It doesn't replace or take the place of a 2D mammogram. 2D mammograms remain an effective screening test for all women.  Not all insurance companies cover the cost of a 3D mammogram. Check with your " "insurance.            Sep 06, 2018  8:30 AM CDT   DX HIP/PELVIS/SPINE with [id number]   [facility] ([facility])    [address] [place] [address] [phone]           Please do not take any of the following 24 hours prior to the day of your exam: vitamins, calcium tablets, antacids.  If possible, please wear clothes without metal (snaps, zippers). A sweatsuit works well.              Who to contact     If you have questions or need follow up information about today's clinic visit or your schedule please contact [facility] directly at [phone].  Normal or non-critical lab and imaging results will be communicated to you by [employer], letter or phone within 4 business days after the clinic has received the results. If you do not hear from us within 7 days, please contact the clinic through Aptot or phone. If you have a critical or abnormal lab result, we will notify you by phone as soon as possible.  Submit refill requests through [employer] or call your pharmacy and they will forward the refill request to us. Please allow 3 business days for your refill to be completed.          Additional Information About Your Visit        [employer] Information     [employer] gives you secure access to your electronic health record. If you see a primary care provider, you can also send messages to your care team and make appointments. If you have questions, please call your primary care clinic.  If you do not have a primary care provider, please call [phone] and they will assist you.        Care EveryWhere ID     This is your Care EveryWhere ID. This could be used by other organizations to access your [facility] medical records  [id number]        Your Vitals Were     Height Last Period Breastfeeding? BMI (Body Mass Index)          5' 4.5\" (1.638 m) 06/27/2007 No 34.64 kg/m2         Blood Pressure from Last 3 Encounters:   07/31/18 128/80 "   05/15/18 124/84   05/09/18 140/84    Weight from Last 3 Encounters:   07/31/18 205 lb (93 kg)   05/15/18 206 lb 3.2 oz (93.5 kg)   05/09/18 209 lb 6.4 oz (95 kg)              We Performed the Following     HPV High Risk Types DNA Cervical     Pap imaged thin layer screen with HPV - recommended age 30 - 65          Today's Medication Changes          These changes are accurate as of 7/31/18  4:30 PM.  If you have any questions, ask your nurse or doctor.               These medicines have changed or have updated prescriptions.        Dose/Directions    valACYclovir 500 MG tablet   Commonly known as:  VALTREX   This may have changed:    - how much to take  - how to take this  - when to take this  - additional instructions   Used for:  HSV infection   Changed by:  Romina Lopes MD        Dose:  500 mg   Take 1 tablet (500 mg) by mouth daily   Quantity:  90 tablet   Refills:  3            Where to get your medicines      These medications were sent to [place] [address] [place] [facility] [address] [place] [address]  [address] [place] [address]     Phone:  [phone]     valACYclovir 500 MG tablet                Primary Care Provider Office Phone # Fax #    Krish Shannon [phone]       [address] [place] [id number]        Equal Access to Services     [facility] RACHEL : Hadii ortega taylor hadasho Soomaali, waaxda luqadaha, qaybta kaalmada debi, ida shipman. So [facility] [phone].    ATENCIÓN: Si habla español, tiene a denis disposición servicios gratuitos de asistencia lingüística. Miguel al [phone].    We comply with applicable federal civil rights laws and [place] laws. We do not discriminate on the basis of race, color, national origin, age, disability, sex, sexual orientation, or gender identity.            Thank you!     Thank you for choosing [facility]  for your care. Our goal is always to provide you with excellent  care. Hearing back from our patients is one way we can continue to improve our services. Please take a few minutes to complete the written survey that you may receive in the mail after your visit with us. Thank you!             Your Updated Medication List - Protect others around you: Learn how to safely use, store and throw away your medicines at www.disposemymeds.org.          This list is accurate as of 7/31/18  4:30 PM.  Always use your most recent med list.                   Brand Name Dispense Instructions for use Diagnosis    coenzyme Q-10 100 MG Caps      1 daily        EPINEPHrine 0.3 MG/0.3ML injection 2-pack    EPIPEN/ADRENACLICK/or ANY BX GENERIC EQUIV    0.6 mL    Inject 0.3 mLs (0.3 mg) into the muscle as needed    Crustacean allergy       Hydrocortisone-Iodoquinol 1-1 % Crea      Externally apply  topically daily as needed.        hydroxychloroquine 200 MG tablet    PLAQUENIL     2 TABLET DAILY        levothyroxine 137 MCG tablet    SYNTHROID/LEVOTHROID    90 tablet    Take 1 tablet (137 mcg) by mouth daily    Hypothyroidism due to acquired atrophy of thyroid       Lidocaine 2 % Gel     30 g    Place 1 Dose rectally every 4 hours as needed    Anal fissure       metoprolol tartrate 50 MG tablet    LOPRESSOR    180 tablet    Take 1 tablet (50 mg) by mouth 2 times daily    Essential hypertension, benign       naproxen 500 MG tablet    NAPROSYN     TK 1 OR 2 TS PO QD PRN        nifedipine 0.2% in white petrolatum 0.2 % Oint ointment     45 g    Place rectally every 6 hours for 28 days    Anal fissure       replens Gel      Place vaginally as needed        simvastatin 40 MG tablet    ZOCOR    90 tablet    Take 1 tablet by mouth at  bedtime    Hyperlipidemia LDL goal <130       spironolactone-hydrochlorothiazide 25-25 MG per tablet    ALDACTAZIDE    90 tablet    Take 1 tablet by mouth daily    Essential hypertension, benign       valACYclovir 500 MG tablet    VALTREX    90 tablet    Take 1 tablet (500 mg)  by mouth daily    HSV infection

## 2018-07-31 NOTE — PROGRESS NOTES
Cherry is a 57 year old  female who presents for annual exam.     Besides routine health maintenance, she has no other health concerns today .    HPI:  The patient's PCP is Krish Shannon MD.    Patient hasn't had pap since , no hpv  Pap and hpv today  Schedule dexa and mammogram  Nulliparous, sexually active      GYNECOLOGIC HISTORY:    Patient's last menstrual period was 2007.  Her current contraception method is: menopause.  She  reports that she has never smoked. She has never used smokeless tobacco.    Patient is sexually active.  STD testing offered?  Declined  Last PHQ-9 score on record =   PHQ-9 SCORE 2018   Total Score 0     Last GAD7 score on record =   GAIL-7 SCORE 2018   Total Score 0     Alcohol Score = 3    HEALTH MAINTENANCE:  Cholesterol: (  Cholesterol   Date Value Ref Range Status   2018 157 <200 mg/dL Final   2017 153 <200 mg/dL Final    18  Total= 157, Triglycerides=57, HDL=48, LDL=98, FBS=76, TSH=0.69    Last Mammo: one year ago, Result: normal, Next Mammo: needs to schedule  Pap: (  Lab Results   Component Value Date    PAP NIL 01/15/2015    PAP NIL 2011    PAP NIL 2010   1/15/15 WNL  Colonoscopy:  , Result: normal, Next Colonoscopy: overdue-wants to do iFit-currently dealing with fissure.  Dexa:  13    Health maintenance updated:  yes    HISTORY:  Obstetric History       T0      L0     SAB0   TAB0   Ectopic0   Multiple0   Live Births0           Patient Active Problem List   Diagnosis     Hypothyroidism     Proteinuria     Essential hypertension, benign     Family history of malignant neoplasm of breast     Crustacean allergy     Obesity     Other specified disorder of rectum and anus     Hemorrhoids     Health Care Home     Connective tissue disorder (H)     Impaired fasting glucose     Hyperlipidemia LDL goal <130     Hallux abducto valgus     Advanced directives, counseling/discussion     Past Surgical  History:   Procedure Laterality Date     C NONSPECIFIC PROCEDURE      Tosillectomy     C NONSPECIFIC PROCEDURE      rectal abcess     CRYOTHERAPY, CERVICAL           HC RECONSTR NOSE+BULL SEPTAL REPAIR             Social History   Substance Use Topics     Smoking status: Never Smoker     Smokeless tobacco: Never Used     Alcohol use Yes      Comment: 2-3 times per week      Problem (# of Occurrences) Relation (Name,Age of Onset)    Breast Cancer (1) Mother: At 55 yrs    C.A.D. (1) Mother:  of CHF,  at 58yrs of ? MI    Cancer (6) Father: Lung Cancer, Brother: brain cancer  , Maternal Aunt: vaginal, Maternal Aunt: cervical, Maternal Aunt: Rectal cancer, Maternal Grandfather: Stomach cancer in his 40s    Connective Tissue Disorder (1) Mother: Lupus    Diabetes (1) Mother: Mid forties    Hypertension (1) Mother    Respiratory (2) Father: d. 79 from complications of pneumonia, Brother: COPD- Lung transplant               Current Outpatient Prescriptions   Medication Sig     CO Q-10 100 MG OR CAPS 1 daily     EPINEPHrine 0.3 MG/0.3ML injection Inject 0.3 mLs (0.3 mg) into the muscle as needed     HYDROXYCHLOROQUINE SULFATE 200 MG PO TABS 2 TABLET DAILY     Iodoquinol-HC (HYDROCORTISONE-IODOQUINOL) 1-1 % CREA Externally apply  topically daily as needed.     levothyroxine (SYNTHROID/LEVOTHROID) 137 MCG tablet Take 1 tablet (137 mcg) by mouth daily     Lidocaine 2 % GEL Place 1 Dose rectally every 4 hours as needed     metoprolol tartrate (LOPRESSOR) 50 MG tablet Take 1 tablet (50 mg) by mouth 2 times daily     naproxen (NAPROSYN) 500 MG tablet TK 1 OR 2 TS PO QD PRN     nifedipine 0.2% in white petrolatum 0.2 % OINT ointment Place rectally every 6 hours for 28 days     simvastatin (ZOCOR) 40 MG tablet Take 1 tablet by mouth at  bedtime     spironolactone-hydrochlorothiazide (ALDACTAZIDE) 25-25 MG per tablet Take 1 tablet by mouth daily     valACYclovir (VALTREX) 500 MG tablet Take 1 tablet (500  "mg) by mouth daily     Vaginal Lubricant (REPLENS) GEL Place vaginally as needed     [DISCONTINUED] valACYclovir (VALTREX) 500 MG tablet Take 1 tablet by mouth 2 times daily for 3 days per outbreak then switch to daily 500 mg for supression     No current facility-administered medications for this visit.      Allergies   Allergen Reactions     Ciprofloxacin Diarrhea     Codeine      severe nausea     Lisinopril Anaphylaxis     angioedema     Shellfish Allergy Nausea and Vomiting     Hives on neck     Sulfa Drugs      hives       Past medical, surgical, social and family histories were reviewed and updated in EPIC.    ROS:   12 point review of systems negative other than symptoms noted below.    EXAM:  /80  Ht 5' 4.5\" (1.638 m)  Wt 205 lb (93 kg)  LMP 06/27/2007  Breastfeeding? No  BMI 34.64 kg/m2   BMI: Body mass index is 34.64 kg/(m^2).    PHYSICAL EXAM:  Constitutional:  Appearance: Well nourished, well developed, alert, in no acute distress  Neck:  Lymph Nodes:  No lymphadenopathy present    Thyroid:  Gland size normal, nontender, no nodules or masses present  on palpation  Chest:  Respiratory Effort:  Breathing unlabored  Cardiovascular:    Heart: Auscultation:  Regular rate, normal rhythm, no murmurs present  Breasts: Inspection of Breasts:  No lymphadenopathy present., Palpation of Breasts and Axillae:  No masses present on palpation, no breast tenderness., Axillary Lymph Nodes:  No lymphadenopathy present. and No nodularity, asymmetry or nipple discharge bilaterally.  Gastrointestinal:   Abdominal Examination:  Abdomen nontender to palpation, tone normal without rigidity or guarding, no masses present, umbilicus without lesions   Liver and Spleen:  No hepatomegaly present, liver nontender to palpation    Hernias:  No hernias present  Lymphatic: Lymph Nodes:  No other lymphadenopathy present  Skin:  General Inspection:  No rashes present, no lesions present, no areas of  discoloration    Genitalia " and Groin:  No rashes present, no lesions present, no areas of  discoloration, no masses present  Neurologic/Psychiatric:    Mental Status:  Oriented X3     Pelvic Exam:  External Genitalia:     Normal appearance for age, no discharge present, no tenderness present, no inflammatory lesions present, color normal  Vagina:     Normal vaginal vault without central or paravaginal defects, no discharge present, no inflammatory lesions present, no masses present  Bladder:     Nontender to palpation  Urethra:   Urethral Body:  Urethra palpation normal, urethra structural support normal   Urethral Meatus:  No erythema or lesions present  Cervix:     Appearance healthy, no lesions present, nontender to palpation, no bleeding present  Uterus:     Uterus: firm, normal sized and nontender, midplane in position.   Adnexa:     No adnexal tenderness present, no adnexal masses present  Perineum:     Perineum within normal limits, no evidence of trauma, no rashes or skin lesions present  Anus:     Anus within normal limits, no hemorrhoids present  Inguinal Lymph Nodes:     No lymphadenopathy present  Pubic Hair:     Normal pubic hair distribution for age  Genitalia and Groin:     No rashes present, no lesions present, no areas of discoloration, no masses present      COUNSELING:   Reviewed preventive health counseling, as reflected in patient instructions       Regular exercise       Healthy diet/nutrition    BMI: Body mass index is 34.64 kg/(m^2).  Weight management plan: Patient was referred to their PCP to discuss a diet and exercise plan.    ASSESSMENT:  57 year old female with satisfactory annual exam.    ICD-10-CM    1. Encounter for gynecological examination without abnormal finding Z01.419 Pap imaged thin layer screen with HPV - recommended age 30 - 65     HPV High Risk Types DNA Cervical   2. HSV infection B00.9 valACYclovir (VALTREX) 500 MG tablet       PLAN:  Refill valtrex for daily supresssion 500 mg with refills for 1  years  Return for mammogram and dexa  Pap and hpv done today      Romina Lopes MD

## 2018-08-01 ASSESSMENT — ANXIETY QUESTIONNAIRES: GAD7 TOTAL SCORE: 0

## 2018-08-01 ASSESSMENT — PATIENT HEALTH QUESTIONNAIRE - PHQ9: SUM OF ALL RESPONSES TO PHQ QUESTIONS 1-9: 0

## 2018-08-03 LAB
COPATH REPORT: NORMAL
PAP: NORMAL

## 2018-08-05 DIAGNOSIS — I10 ESSENTIAL HYPERTENSION, BENIGN: ICD-10-CM

## 2018-08-05 NOTE — TELEPHONE ENCOUNTER
"Requested Prescriptions   Pending Prescriptions Disp Refills     spironolactone-hydrochlorothiazide (ALDACTAZIDE) 25-25 MG per tablet [Pharmacy Med Name: SPIRONOLACTONE-HCTZ 25-25 TAB] 90 tablet 0    Last Written Prescription Date:  5/9/2018  Last Fill Quantity: 90,  # refills: 0   Last office visit: 5/15/2018 with prescribing provider:  5/15/2018   Future Office Visit:     Sig: TAKE ONE TABLET BY MOUTH ONCE DAILY. REPLACES PLAIN HCTZ.    Diuretics (Including Combos) Protocol Passed    8/5/2018 12:16 PM       Passed - Blood pressure under 140/90 in past 12 months    BP Readings from Last 3 Encounters:   07/31/18 128/80   05/15/18 124/84   05/09/18 140/84                Passed - Recent (12 mo) or future (30 days) visit within the authorizing provider's specialty    Patient had office visit in the last 12 months or has a visit in the next 30 days with authorizing provider or within the authorizing provider's specialty.  See \"Patient Info\" tab in inbasket, or \"Choose Columns\" in Meds & Orders section of the refill encounter.           Passed - Patient is age 18 or older       Passed - No active pregancy on record       Passed - Normal serum creatinine on file in past 12 months    Recent Labs   Lab Test  07/19/18   1645   CR  0.74             Passed - Normal serum potassium on file in past 12 months    Recent Labs   Lab Test  07/19/18   1645   POTASSIUM  3.6                   Passed - Normal serum sodium on file in past 12 months    Recent Labs   Lab Test  07/19/18   1645   NA  139             Passed - No positive pregnancy test in past 12 months          "

## 2018-08-06 LAB
FINAL DIAGNOSIS: NORMAL
HPV HR 12 DNA CVX QL NAA+PROBE: NEGATIVE
HPV16 DNA SPEC QL NAA+PROBE: NEGATIVE
HPV18 DNA SPEC QL NAA+PROBE: NEGATIVE
SPECIMEN DESCRIPTION: NORMAL
SPECIMEN SOURCE CVX/VAG CYTO: NORMAL

## 2018-08-07 RX ORDER — SPIRONOLACTONE AND HYDROCHLOROTHIAZIDE 25; 25 MG/1; MG/1
TABLET ORAL
Qty: 90 TABLET | Refills: 2 | Status: SHIPPED | OUTPATIENT
Start: 2018-08-07 | End: 2019-04-23

## 2018-09-06 ENCOUNTER — RADIANT APPOINTMENT (OUTPATIENT)
Dept: BONE DENSITY | Facility: CLINIC | Age: 58
End: 2018-09-06
Payer: COMMERCIAL

## 2018-09-06 ENCOUNTER — RADIANT APPOINTMENT (OUTPATIENT)
Dept: MAMMOGRAPHY | Facility: CLINIC | Age: 58
End: 2018-09-06
Payer: COMMERCIAL

## 2018-09-06 DIAGNOSIS — Z78.0 ASYMPTOMATIC POSTMENOPAUSAL STATE: ICD-10-CM

## 2018-09-06 DIAGNOSIS — Z12.31 VISIT FOR SCREENING MAMMOGRAM: ICD-10-CM

## 2018-09-06 PROCEDURE — 77063 BREAST TOMOSYNTHESIS BI: CPT | Mod: TC

## 2018-09-06 PROCEDURE — 77080 DXA BONE DENSITY AXIAL: CPT | Performed by: OBSTETRICS & GYNECOLOGY

## 2018-09-06 PROCEDURE — 77067 SCR MAMMO BI INCL CAD: CPT | Mod: TC

## 2018-10-05 ENCOUNTER — MYC MEDICAL ADVICE (OUTPATIENT)
Dept: INTERNAL MEDICINE | Facility: CLINIC | Age: 58
End: 2018-10-05

## 2018-10-05 DIAGNOSIS — K62.89 ANAL PAIN: Primary | ICD-10-CM

## 2018-10-24 ENCOUNTER — TRANSFERRED RECORDS (OUTPATIENT)
Dept: HEALTH INFORMATION MANAGEMENT | Facility: CLINIC | Age: 58
End: 2018-10-24

## 2018-11-01 DIAGNOSIS — I10 ESSENTIAL HYPERTENSION, BENIGN: ICD-10-CM

## 2018-11-01 RX ORDER — METOPROLOL TARTRATE 50 MG
TABLET ORAL
Qty: 180 TABLET | Refills: 1 | Status: SHIPPED | OUTPATIENT
Start: 2018-11-01 | End: 2019-04-23

## 2018-11-07 ENCOUNTER — TRANSFERRED RECORDS (OUTPATIENT)
Dept: HEALTH INFORMATION MANAGEMENT | Facility: CLINIC | Age: 58
End: 2018-11-07

## 2018-11-09 ENCOUNTER — THERAPY VISIT (OUTPATIENT)
Dept: PHYSICAL THERAPY | Facility: CLINIC | Age: 58
End: 2018-11-09
Payer: COMMERCIAL

## 2018-11-09 DIAGNOSIS — M62.838 MUSCLE SPASM: ICD-10-CM

## 2018-11-09 DIAGNOSIS — M99.05 PELVIC SOMATIC DYSFUNCTION: Primary | ICD-10-CM

## 2018-11-09 DIAGNOSIS — R10.2 PELVIC PAIN: ICD-10-CM

## 2018-11-09 PROCEDURE — 97530 THERAPEUTIC ACTIVITIES: CPT | Mod: GP | Performed by: PHYSICAL THERAPIST

## 2018-11-09 PROCEDURE — 97140 MANUAL THERAPY 1/> REGIONS: CPT | Mod: GP | Performed by: PHYSICAL THERAPIST

## 2018-11-09 PROCEDURE — 97110 THERAPEUTIC EXERCISES: CPT | Mod: GP | Performed by: PHYSICAL THERAPIST

## 2018-11-09 PROCEDURE — 97161 PT EVAL LOW COMPLEX 20 MIN: CPT | Mod: GP | Performed by: PHYSICAL THERAPIST

## 2018-11-09 NOTE — PROGRESS NOTES
[facility] Initial Evaluation  Subjective:  HPI  SUBJECTIVE:  Patient reports onset of symptoms of rectal pain.  Pain worse after having a bowel movement.  She reports pain worse with sitting for 2 hour meetings for work demands.  History of anal fissure diagnosed in May 2018.  Pt has history of abcess/cyst x 20 years ago.  Had episodes of pain in the last 8 years thinking it was hemorhoids.  Pt reports pain feels better if she is active, walking, up and moving around.  Since onset symptoms have been getting better, worse or staying the same? same    Urination:     Pt denies any urinary incontinence, frequency or urgency.  Is the volume of urine passed usually: medium. (8sec rule=  250ml with average bladder storing  400-600ml)    Do you strain to pass urine? No  Do you have a slow or hesitant urinary stream? No  Do you have difficulty initiating the urine stream? No    How many bladder infections have you had in last 12 months?none    Fluid intake(one glass is 8oz or one cup) 4-5 glasses/day, 1caffinated glasses/day  1 alcohol glasses/day.    Bowel habits:  Frequency of bowel movements?3-5 times a day  Consistancy of stool? soft, Ruth Stool Scale 4-5  Do you ignore the urge to defecate? No  Do you strain to pass stool? No    Pelvic Pain:  When do you have pelvic pain? Yes, rectal area  Is initial penetration during intercourse painful? No  Is deeper penetration painful? Yes  Do you use lubricant? yes What kind? ky jelly      Given birth? No   Are you sexually active?Yes  Have you ever been worried for your physical safety? No  Any abdominal or pelvic surgeries? none  Are you having any regular exercise?no  Have you practiced the PF(kegel) exercises for 4 or more weeks?no    Marinoff Scale:Level 1  (Level 3: Abstinence from intercourse because of severe pain. Level 2: Painful intercourse which limites frequency of activity. Level 1: Painful intercourse not severe enough to prevent  activity.)                                    Objective:  System                                 Pelvic Dysfunction Evaluation:        Flexibility:    Tightness present at:Adductors; Piriformis and Gluteals  tight L quadriceps    Pelvic Clock Exam:  Pelvic clock exam: L > R.        Levator ANI:  ++        External Assessment:    Skin Condition:  Normal    Bearing Down/Coughing:  Normal  Tissue Symmetry:  Normal  Introitus:  Normal  Muscle Contraction/Perineal Mobility:  Slight lift, no urogential triangle descent and substitution  Internal Assessment:      Contraction/Grade:  Fair squeeze, definite lift (3)    Accessory Muscle use-Gluteals:  Yes      SEMG Biofeedback:    Equipment:  MR20    Suraface electrode placement--Perianal:  Yes  Baseline EMG PM:  2-3 uV, after verbal and visual cuing, was able to get down to 0.5 uV.  However was hard to reproduce with repeated contract/relax, required increased time to relax fully PFM                                 General     ROS    Assessment/Plan:    Patient is a 58 year old female with pelvic complaints.    Patient has the following significant findings with corresponding treatment plan.                Diagnosis 1:  Tight levator ani/pelvic floor dysfunction  Pain -  manual therapy, self management, education and home program  Decreased ROM/flexibility - manual therapy, therapeutic exercise, therapeutic activity and home program  Impaired muscle performance - biofeedback, neuro re-education and home program  Decreased function - therapeutic activities and home program    Therapy Evaluation Codes:   1) History comprised of:   Personal factors that impact the plan of care:      Time since onset of symptoms.    Comorbidity factors that impact the plan of care are:      High blood pressure, Overweight and connective tissue disease.     Medications impacting care: Anti-inflammatory and High blood pressure.  2) Examination of Body Systems comprised of:   Body structures and  functions that impact the plan of care:      Pelvis.   Activity limitations that impact the plan of care are:      Sitting.  3) Clinical presentation characteristics are:   Stable/Uncomplicated.  4) Decision-Making    Low complexity using standardized patient assessment instrument and/or measureable assessment of functional outcome.  Cumulative Therapy Evaluation is: Low complexity.    Previous and current functional limitations:  (See Goal Flow Sheet for this information)    Short term and Long term goals: (See Goal Flow Sheet for this information)     Communication ability:  Patient appears to be able to clearly communicate and understand verbal and written communication and follow directions correctly.  Treatment Explanation - The following has been discussed with the patient:   RX ordered/plan of care  Anticipated outcomes  Possible risks and side effects  This patient would benefit from PT intervention to resume normal activities.   Rehab potential is good.    Frequency:  1 X week, once daily  Duration:  for 6 weeks tapering to 1 X a month over 2 months  Discharge Plan:  Achieve all LTG.  Independent in home treatment program.  Reach maximal therapeutic benefit.    Please refer to the daily flowsheet for treatment today, total treatment time and time spent performing 1:1 timed codes.

## 2018-11-12 NOTE — PROGRESS NOTES
[facility] Initial Evaluation  Subjective:  Patient is a 58 year old female presenting with rehab left ankle/foot hpi.                                      Pertinent medical history includes:  High blood pressure, overweight and thyroid problems.  Medical allergies: yes (Sulfa drugs, codeine).    Current medications:  Anti-inflammatory, high blood pressure medication and thyroid medication.  Current occupation .                                    Objective:  System    Physical Exam    General     ROS    Assessment/Plan:

## 2018-11-16 ENCOUNTER — THERAPY VISIT (OUTPATIENT)
Dept: PHYSICAL THERAPY | Facility: CLINIC | Age: 58
End: 2018-11-16
Payer: COMMERCIAL

## 2018-11-16 DIAGNOSIS — M99.05 PELVIC SOMATIC DYSFUNCTION: ICD-10-CM

## 2018-11-16 DIAGNOSIS — M62.838 MUSCLE SPASM: ICD-10-CM

## 2018-11-16 DIAGNOSIS — R10.2 PELVIC PAIN: ICD-10-CM

## 2018-11-16 PROCEDURE — 97140 MANUAL THERAPY 1/> REGIONS: CPT | Mod: GP | Performed by: PHYSICAL THERAPIST

## 2018-11-16 PROCEDURE — 97112 NEUROMUSCULAR REEDUCATION: CPT | Mod: GP | Performed by: PHYSICAL THERAPIST

## 2018-11-16 PROCEDURE — 97530 THERAPEUTIC ACTIVITIES: CPT | Mod: GP | Performed by: PHYSICAL THERAPIST

## 2018-11-29 ENCOUNTER — THERAPY VISIT (OUTPATIENT)
Dept: PHYSICAL THERAPY | Facility: CLINIC | Age: 58
End: 2018-11-29
Payer: COMMERCIAL

## 2018-11-29 DIAGNOSIS — M99.05 PELVIC SOMATIC DYSFUNCTION: ICD-10-CM

## 2018-11-29 DIAGNOSIS — M62.838 MUSCLE SPASM: ICD-10-CM

## 2018-11-29 DIAGNOSIS — R10.2 PELVIC PAIN: ICD-10-CM

## 2018-11-29 PROCEDURE — 97140 MANUAL THERAPY 1/> REGIONS: CPT | Mod: GP | Performed by: PHYSICAL THERAPIST

## 2018-11-29 PROCEDURE — 97530 THERAPEUTIC ACTIVITIES: CPT | Mod: GP | Performed by: PHYSICAL THERAPIST

## 2018-12-07 ENCOUNTER — THERAPY VISIT (OUTPATIENT)
Dept: PHYSICAL THERAPY | Facility: CLINIC | Age: 58
End: 2018-12-07
Payer: COMMERCIAL

## 2018-12-07 DIAGNOSIS — R10.2 PELVIC PAIN: ICD-10-CM

## 2018-12-07 DIAGNOSIS — M99.05 PELVIC SOMATIC DYSFUNCTION: ICD-10-CM

## 2018-12-07 DIAGNOSIS — M62.838 MUSCLE SPASM: ICD-10-CM

## 2018-12-07 PROCEDURE — 97110 THERAPEUTIC EXERCISES: CPT | Mod: GP | Performed by: PHYSICAL THERAPIST

## 2018-12-07 PROCEDURE — 97530 THERAPEUTIC ACTIVITIES: CPT | Mod: GP | Performed by: PHYSICAL THERAPIST

## 2018-12-07 PROCEDURE — 97140 MANUAL THERAPY 1/> REGIONS: CPT | Mod: GP | Performed by: PHYSICAL THERAPIST

## 2018-12-13 ENCOUNTER — THERAPY VISIT (OUTPATIENT)
Dept: PHYSICAL THERAPY | Facility: CLINIC | Age: 58
End: 2018-12-13
Payer: COMMERCIAL

## 2018-12-13 DIAGNOSIS — R10.2 PELVIC PAIN: ICD-10-CM

## 2018-12-13 DIAGNOSIS — M99.05 PELVIC SOMATIC DYSFUNCTION: ICD-10-CM

## 2018-12-13 DIAGNOSIS — M62.838 MUSCLE SPASM: ICD-10-CM

## 2018-12-13 PROCEDURE — 97140 MANUAL THERAPY 1/> REGIONS: CPT | Mod: GP | Performed by: PHYSICAL THERAPIST

## 2018-12-13 PROCEDURE — 97110 THERAPEUTIC EXERCISES: CPT | Mod: GP | Performed by: PHYSICAL THERAPIST

## 2018-12-20 ENCOUNTER — THERAPY VISIT (OUTPATIENT)
Dept: PHYSICAL THERAPY | Facility: CLINIC | Age: 58
End: 2018-12-20
Payer: COMMERCIAL

## 2018-12-20 DIAGNOSIS — R10.2 PELVIC PAIN: ICD-10-CM

## 2018-12-20 DIAGNOSIS — M99.05 PELVIC SOMATIC DYSFUNCTION: ICD-10-CM

## 2018-12-20 DIAGNOSIS — M62.838 MUSCLE SPASM: ICD-10-CM

## 2018-12-20 PROCEDURE — 97110 THERAPEUTIC EXERCISES: CPT | Mod: GP | Performed by: PHYSICAL THERAPIST

## 2018-12-20 PROCEDURE — 97140 MANUAL THERAPY 1/> REGIONS: CPT | Mod: GP | Performed by: PHYSICAL THERAPIST

## 2018-12-27 ENCOUNTER — THERAPY VISIT (OUTPATIENT)
Dept: PHYSICAL THERAPY | Facility: CLINIC | Age: 58
End: 2018-12-27
Payer: COMMERCIAL

## 2018-12-27 DIAGNOSIS — M99.05 PELVIC SOMATIC DYSFUNCTION: ICD-10-CM

## 2018-12-27 DIAGNOSIS — R10.2 PELVIC PAIN: ICD-10-CM

## 2018-12-27 DIAGNOSIS — M62.838 MUSCLE SPASM: ICD-10-CM

## 2018-12-27 PROCEDURE — 97530 THERAPEUTIC ACTIVITIES: CPT | Mod: GP | Performed by: PHYSICAL THERAPIST

## 2018-12-27 PROCEDURE — 97110 THERAPEUTIC EXERCISES: CPT | Mod: GP | Performed by: PHYSICAL THERAPIST

## 2019-01-04 ENCOUNTER — THERAPY VISIT (OUTPATIENT)
Dept: PHYSICAL THERAPY | Facility: CLINIC | Age: 59
End: 2019-01-04
Payer: COMMERCIAL

## 2019-01-04 DIAGNOSIS — R10.2 PELVIC PAIN: ICD-10-CM

## 2019-01-04 DIAGNOSIS — M62.838 MUSCLE SPASM: ICD-10-CM

## 2019-01-04 DIAGNOSIS — M99.05 PELVIC SOMATIC DYSFUNCTION: ICD-10-CM

## 2019-01-04 PROCEDURE — 97110 THERAPEUTIC EXERCISES: CPT | Mod: GP | Performed by: PHYSICAL THERAPIST

## 2019-01-04 PROCEDURE — 97530 THERAPEUTIC ACTIVITIES: CPT | Mod: GP | Performed by: PHYSICAL THERAPIST

## 2019-01-04 NOTE — PROGRESS NOTES
Subjective:  HPI                    Objective:  System    Physical Exam    General     ROS    Assessment/Plan:    PROGRESS  REPORT    Progress reporting period is from 11/9/2018 to 1/4/2019.       SUBJECTIVE  Subjective changes noted by patient:  .  Subjective: pt reports she has been using the ball to sit on at work.  Feels much better and more comfortable because she can move more.  Able to manage with therawand, foam roller and stretches.  Still has some groin pain sometimes and HS pain is better overall.  Still has difficulty sitting > 1 hour without 2-3/10 pain.      Current pain level is  Current Pain level: 3/10.     Previous pain level was   Initial Pain level: 5/10.   Changes in function:  Yes (See Goal flowsheet attached for changes in current functional level)  Adverse reaction to treatment or activity: None    OBJECTIVE  Changes noted in objective findings:    Objective: poor posture wtih rounded shoulders and forward head.  pt tolerated all exercises.  She did notice affect of posture correction improving sacral/rectal pain     ASSESSMENT/PLAN  Updated problem list and treatment plan: Diagnosis 1: tight levator ani spasms/pelvic floor dysfunction   Pain -  manual therapy, self management, education and home program  Decreased ROM/flexibility - manual therapy, therapeutic exercise, therapeutic activity and home program  Decreased joint mobility - manual therapy, therapeutic exercise, therapeutic activity and home program  Decreased strength - therapeutic exercise, therapeutic activities and home program  Impaired muscle performance - neuro re-education and home program  Decreased function - therapeutic activities and home program  Impaired posture - neuro re-education, therapeutic activities and home program  STG/LTGs have been met or progress has been made towards goals:  Yes (See Goal flow sheet completed today.)  Assessment of Progress: The patient's condition is improving.  Self Management Plans:   Patient has been instructed in a home treatment program.  Patient  has been instructed in self management of symptoms.  I have re-evaluated this patient and find that the nature, scope, duration and intensity of the therapy is appropriate for the medical condition of the patient.  Cherry continues to require the following intervention to meet STG and LTG's:  PT    Recommendations:  This patient would benefit from continued therapy.     Frequency:  2 X a month, once daily  Duration:  for 2 months    Please refer to the daily flowsheet for treatment today, total treatment time and time spent performing 1:1 timed codes.

## 2019-01-18 ENCOUNTER — THERAPY VISIT (OUTPATIENT)
Dept: PHYSICAL THERAPY | Facility: CLINIC | Age: 59
End: 2019-01-18
Payer: COMMERCIAL

## 2019-01-18 DIAGNOSIS — M62.838 MUSCLE SPASM: ICD-10-CM

## 2019-01-18 DIAGNOSIS — M99.05 PELVIC SOMATIC DYSFUNCTION: ICD-10-CM

## 2019-01-18 DIAGNOSIS — R10.2 PELVIC PAIN: ICD-10-CM

## 2019-01-18 PROCEDURE — 97140 MANUAL THERAPY 1/> REGIONS: CPT | Mod: GP | Performed by: PHYSICAL THERAPIST

## 2019-01-18 PROCEDURE — 97530 THERAPEUTIC ACTIVITIES: CPT | Mod: GP | Performed by: PHYSICAL THERAPIST

## 2019-01-18 PROCEDURE — 97110 THERAPEUTIC EXERCISES: CPT | Mod: GP | Performed by: PHYSICAL THERAPIST

## 2019-02-08 ENCOUNTER — THERAPY VISIT (OUTPATIENT)
Dept: PHYSICAL THERAPY | Facility: CLINIC | Age: 59
End: 2019-02-08
Payer: COMMERCIAL

## 2019-02-08 DIAGNOSIS — M62.838 MUSCLE SPASM: ICD-10-CM

## 2019-02-08 DIAGNOSIS — M99.05 PELVIC SOMATIC DYSFUNCTION: ICD-10-CM

## 2019-02-08 DIAGNOSIS — R10.2 PELVIC PAIN: ICD-10-CM

## 2019-02-08 PROCEDURE — 97140 MANUAL THERAPY 1/> REGIONS: CPT | Mod: GP | Performed by: PHYSICAL THERAPIST

## 2019-02-08 PROCEDURE — 97530 THERAPEUTIC ACTIVITIES: CPT | Mod: GP | Performed by: PHYSICAL THERAPIST

## 2019-02-15 ENCOUNTER — THERAPY VISIT (OUTPATIENT)
Dept: PHYSICAL THERAPY | Facility: CLINIC | Age: 59
End: 2019-02-15
Payer: COMMERCIAL

## 2019-02-15 DIAGNOSIS — M99.05 PELVIC SOMATIC DYSFUNCTION: ICD-10-CM

## 2019-02-15 DIAGNOSIS — M62.838 MUSCLE SPASM: ICD-10-CM

## 2019-02-15 DIAGNOSIS — R10.2 PELVIC PAIN: ICD-10-CM

## 2019-02-15 PROCEDURE — 97530 THERAPEUTIC ACTIVITIES: CPT | Mod: GP | Performed by: PHYSICAL THERAPIST

## 2019-02-15 PROCEDURE — 97140 MANUAL THERAPY 1/> REGIONS: CPT | Mod: GP | Performed by: PHYSICAL THERAPIST

## 2019-02-15 PROCEDURE — 97112 NEUROMUSCULAR REEDUCATION: CPT | Mod: GP | Performed by: PHYSICAL THERAPIST

## 2019-02-15 NOTE — LETTER
[facility] [place] [facility]ERAP[facility]  [address]  [facility] [address]  [phone]    2019    Re: Cherry Kim   :   1960  MRN:  [id number]   REFERRING PHYSICIAN:   Damari Beach    [facility] [place] PHYSICAL [facility]    Date of Initial Evaluation:  2018  Visits:  Rxs Used: 11  Reason for Referral:     Pelvic somatic dysfunction  Muscle spasm  Pelvic pain    PROGRESS  REPORT  Progress reporting period is from 2019 to 2/15/2019.       SUBJECTIVE  Subjective changes noted by patient:  .  Subjective: pt seen total of 11 visits.  She reports has progressed with feeling 50% better.  After last treatment, was really sore and has had more difficulty with BM.  She also did have some bleeding with BM on . She has not done her exercises or used therawand for self release as much as this week has been painful.  Pt inquires about other treatment options or possibly return to MD for injection.    Current pain level is  Current Pain level: 2/10(after therapy, prior to therapy pain 5/10).     Previous pain level was   Initial Pain level: 5/10.   Changes in function:  Yes (See Goal flowsheet attached for changes in current functional level)  Adverse reaction to treatment or activity: treatment - increased pain/soreness    OBJECTIVE  Changes noted in objective findings:    Objective: resting tone at start of care is high about 2.9 uV.  With MT and cueing, she is able to bring it own to 0.9 uV.  She refused internal anal work today.  We palpated vaginally and was able to trigger tightness and MF tenderness on levator ani muscles.  PFM strength 3/5.       ASSESSMENT/PLAN  Updated problem list and treatment plan: Diagnosis 1:  Levator ani spasm/pelvic pain  Pain -  manual therapy, self management, education and home program  Decreased ROM/flexibility - manual therapy, therapeutic exercise, therapeutic activity and  home program  Impaired muscle performance - biofeedback, neuro re-education and home program  Decreased function - therapeutic activities and home program  STG/LTGs have been met or progress has been made towards goals:  Yes (See Goal flow sheet completed today.)  Assessment of Progress: The patient's progress has plateaued.  Self Management Plans:  Patient has been instructed in a home treatment program.  Patient  has been instructed in self management of symptoms.  I have re-evaluated this patient and find that the nature, scope, duration and intensity of the therapy is appropriate for the medical condition of the patient.  Cherry continues to require the following intervention to meet STG and LTG's:  Would also benefit return to MD    Recommendations:  This patient would benefit from further evaluation.  Possible injection or acupuncture to help with pain as progress has plateaued.    Thank you for your referral.    INQUIRIES  Therapist: Richardson Lares DPT   [facility]  [address] [place] [address] [id number]  [facility] [address]  Phone: [phone]  Fax: [phone]

## 2019-02-15 NOTE — PROGRESS NOTES
Subjective:  HPI                    Objective:  System    Physical Exam    General     ROS    Assessment/Plan:    PROGRESS  REPORT    Progress reporting period is from 1/4/2019 to 2/15/2019.       SUBJECTIVE  Subjective changes noted by patient:  .  Subjective: pt seen total of 11 visits.  She reports has progressed with feeling 50% better.  After last treatment, was really sore and has had more difficulty with BM.  She also did have some bleeding with BM on Sunday. She has not done her exercises or used therawand for self release as much as this week has been painful.  Pt inquires about other treatment options or possibly return to MD for injection.    Current pain level is  Current Pain level: 2/10(after therapy, prior to therapy pain 5/10).     Previous pain level was   Initial Pain level: 5/10.   Changes in function:  Yes (See Goal flowsheet attached for changes in current functional level)  Adverse reaction to treatment or activity: treatment - increased pain/soreness    OBJECTIVE  Changes noted in objective findings:    Objective: resting tone at start of care is high about 2.9 uV.  With MT and cueing, she is able to bring it own to 0.9 uV.  She refused internal anal work today.  We palpated vaginally and was able to trigger tightness and MF tenderness on levator ani muscles.  PFM strength 3/5.       ASSESSMENT/PLAN  Updated problem list and treatment plan: Diagnosis 1:  Levator ani spasm/pelvic pain  Pain -  manual therapy, self management, education and home program  Decreased ROM/flexibility - manual therapy, therapeutic exercise, therapeutic activity and home program  Impaired muscle performance - biofeedback, neuro re-education and home program  Decreased function - therapeutic activities and home program  STG/LTGs have been met or progress has been made towards goals:  Yes (See Goal flow sheet completed today.)  Assessment of Progress: The patient's progress has plateaued.  Self Management Plans:   Patient has been instructed in a home treatment program.  Patient  has been instructed in self management of symptoms.  I have re-evaluated this patient and find that the nature, scope, duration and intensity of the therapy is appropriate for the medical condition of the patient.  Cherry continues to require the following intervention to meet STG and LTG's:  Would also benefit return to MD    Recommendations:  This patient would benefit from further evaluation.  Possible injection or acupuncture to help with pain as progress has plateaued.    Please refer to the daily flowsheet for treatment today, total treatment time and time spent performing 1:1 timed codes.

## 2019-02-22 ENCOUNTER — THERAPY VISIT (OUTPATIENT)
Dept: PHYSICAL THERAPY | Facility: CLINIC | Age: 59
End: 2019-02-22
Payer: COMMERCIAL

## 2019-02-22 DIAGNOSIS — M62.838 MUSCLE SPASM: ICD-10-CM

## 2019-02-22 DIAGNOSIS — M99.05 PELVIC SOMATIC DYSFUNCTION: ICD-10-CM

## 2019-02-22 DIAGNOSIS — R10.2 PELVIC PAIN: ICD-10-CM

## 2019-02-22 PROCEDURE — 97140 MANUAL THERAPY 1/> REGIONS: CPT | Mod: GP | Performed by: PHYSICAL THERAPIST

## 2019-02-22 PROCEDURE — 97530 THERAPEUTIC ACTIVITIES: CPT | Mod: GP | Performed by: PHYSICAL THERAPIST

## 2019-03-08 ENCOUNTER — THERAPY VISIT (OUTPATIENT)
Dept: PHYSICAL THERAPY | Facility: CLINIC | Age: 59
End: 2019-03-08
Payer: COMMERCIAL

## 2019-03-08 DIAGNOSIS — M99.05 PELVIC SOMATIC DYSFUNCTION: ICD-10-CM

## 2019-03-08 DIAGNOSIS — M62.838 MUSCLE SPASM: ICD-10-CM

## 2019-03-08 DIAGNOSIS — R10.2 PELVIC PAIN: ICD-10-CM

## 2019-03-08 PROCEDURE — 97530 THERAPEUTIC ACTIVITIES: CPT | Mod: GP | Performed by: PHYSICAL THERAPIST

## 2019-03-08 PROCEDURE — 97140 MANUAL THERAPY 1/> REGIONS: CPT | Mod: GP | Performed by: PHYSICAL THERAPIST

## 2019-03-08 NOTE — LETTER
[facility] [place] PHYSICAL THERAPY  [address]  [facility] [address]  [phone]    2019    Re: Cherry A Pravin Kim   :   1960  MRN:  [id number]   REFERRING PHYSICIAN:   Damari Beach    [facility] [place] [facility]    Date of Initial Evaluation:  18  Visits:  Rxs Used: 13  Reason for Referral:     Pelvic somatic dysfunction  Muscle spasm  Pelvic pain    EVALUATION SUMMARY    DISCHARGE REPORT  Progress reporting period is from 2/15/2019 to 3/8/2019.       SUBJECTIVE  Subjective changes noted by patient:  .  Subjective: Pt reports no change.  Has appt next Wed with MD.  continues to work on self management with muriel.  Sitting is hard at end of day.  OVerall she reprots 50% improvement since start of care    Current pain level is  Current Pain level: 4/10.     Previous pain level was   Initial Pain level: 5/10.   Changes in function:  Yes (See Goal flowsheet attached for changes in current functional level)  Adverse reaction to treatment or activity: None    OBJECTIVE  Changes noted in objective findings:    Objective: continue to have MF tenderness to B levator ani.       ASSESSMENT/PLAN  Updated problem list and treatment plan: Diagnosis 1:  Levator ani spasm  Pain -  manual therapy, self management, education and home program  Decreased ROM/flexibility - manual therapy, therapeutic exercise, therapeutic activity and home program  Impaired muscle performance - neuro re-education and home program  Decreased function - therapeutic activities and home program  STG/LTGs have been met or progress has been made towards goals:  Yes (See Goal flow sheet completed today.)  Assessment of Progress: The patient's progress has plateaued.  Self Management Plans:  Patient is independent in a home treatment program.  Patient is independent in self management of symptoms.        Re: Cherry Kim   :    1960    I have re-evaluated this patient and find that the nature, scope, duration and intensity of the therapy is appropriate for the medical condition of the patient.  Cherry continues to require the following intervention to meet STG and LTG's:  PT intervention is no longer required to meet STG/LTG.    Recommendations:  This patient is ready to be discharged from therapy and continue their home treatment program.  Recommend return to MD.  Maybe trial of acupuncture for pain.     Thank you for your referral.    INQUIRIES  Therapist: Richardson Lares DPT   [facility]  [address] [place], [place] [address] [address]  Phone: [phone]  Fax: [phone]

## 2019-03-08 NOTE — PROGRESS NOTES
DISCHARGE REPORT    Progress reporting period is from 2/15/2019 to 3/8/2019.       SUBJECTIVE  Subjective changes noted by patient:  .  Subjective: Pt reports no change.  Has appt next Wed with MD.  continues to work on self management with muriel.  Sitting is hard at end of day.  OVerall she reprots 50% improvement since start of care    Current pain level is  Current Pain level: 4/10.     Previous pain level was   Initial Pain level: 5/10.   Changes in function:  Yes (See Goal flowsheet attached for changes in current functional level)  Adverse reaction to treatment or activity: None    OBJECTIVE  Changes noted in objective findings:    Objective: continue to have MF tenderness to B levator ani.       ASSESSMENT/PLAN  Updated problem list and treatment plan: Diagnosis 1:  Levator ani spasm  Pain -  manual therapy, self management, education and home program  Decreased ROM/flexibility - manual therapy, therapeutic exercise, therapeutic activity and home program  Impaired muscle performance - neuro re-education and home program  Decreased function - therapeutic activities and home program  STG/LTGs have been met or progress has been made towards goals:  Yes (See Goal flow sheet completed today.)  Assessment of Progress: The patient's progress has plateaued.  Self Management Plans:  Patient is independent in a home treatment program.  Patient is independent in self management of symptoms.  I have re-evaluated this patient and find that the nature, scope, duration and intensity of the therapy is appropriate for the medical condition of the patient.  Cherry continues to require the following intervention to meet STG and LTG's:  PT intervention is no longer required to meet STG/LTG.    Recommendations:  This patient is ready to be discharged from therapy and continue their home treatment program.  Recommend return to MD.  Maybe trial of acupuncture for pain.     Please refer to the daily flowsheet for treatment today,  total treatment time and time spent performing 1:1 timed codes.

## 2019-03-13 ENCOUNTER — TRANSFERRED RECORDS (OUTPATIENT)
Dept: HEALTH INFORMATION MANAGEMENT | Facility: CLINIC | Age: 59
End: 2019-03-13

## 2019-04-08 ENCOUNTER — THERAPY VISIT (OUTPATIENT)
Dept: CHIROPRACTIC MEDICINE | Facility: CLINIC | Age: 59
End: 2019-04-08
Payer: COMMERCIAL

## 2019-04-08 DIAGNOSIS — M99.02 THORACIC SEGMENT DYSFUNCTION: ICD-10-CM

## 2019-04-08 DIAGNOSIS — M99.03 SOMATIC DYSFUNCTION OF LUMBAR REGION: Primary | ICD-10-CM

## 2019-04-08 DIAGNOSIS — M53.3 PAIN IN THE COCCYX: ICD-10-CM

## 2019-04-08 DIAGNOSIS — M54.50 LUMBAGO: ICD-10-CM

## 2019-04-08 DIAGNOSIS — M99.04 SOMATIC DYSFUNCTION OF SACRAL REGION: ICD-10-CM

## 2019-04-08 PROCEDURE — 99203 OFFICE O/P NEW LOW 30 MIN: CPT | Mod: 25 | Performed by: CHIROPRACTOR

## 2019-04-08 PROCEDURE — 98941 CHIROPRACT MANJ 3-4 REGIONS: CPT | Mod: AT | Performed by: CHIROPRACTOR

## 2019-04-08 PROCEDURE — 97112 NEUROMUSCULAR REEDUCATION: CPT | Mod: 59 | Performed by: CHIROPRACTOR

## 2019-04-12 PROBLEM — M54.50 LUMBAGO: Status: ACTIVE | Noted: 2019-04-12

## 2019-04-12 PROBLEM — M99.03 SOMATIC DYSFUNCTION OF LUMBAR REGION: Status: ACTIVE | Noted: 2019-04-12

## 2019-04-12 PROBLEM — M99.02 THORACIC SEGMENT DYSFUNCTION: Status: ACTIVE | Noted: 2019-04-12

## 2019-04-12 PROBLEM — M99.04 SOMATIC DYSFUNCTION OF SACRAL REGION: Status: ACTIVE | Noted: 2019-04-12

## 2019-04-12 PROBLEM — M53.3 PAIN IN THE COCCYX: Status: ACTIVE | Noted: 2018-11-09

## 2019-04-12 NOTE — PROGRESS NOTES
Initial Chiropractic Clinic Visit    PCP: Krish Shannon    Cherry Kim is a 58 year old female who is seen  in consultation at the request of  Elaine Silveira M.D. presenting with chronic tailbone/pelvic pain from a fissure . Patient reports that the onset was May 2018.  When asked, patient denies:, falling, slipping, bending and reaching or sleeping awkwardly. The pt reports chronic low back, pelvic and tailbone pain that started last year. She has been in PT for the past several months and is now 25% improved overall. She grades the px a 2-5/10 on VAS. The pain is constant. Sitting will increase the px as does sleeping or bowel movements. The pt denies weakness in the extremities or other unusual sx.  Prior to onset, the patient was able to sleep for 6-7 hours and sit for 6-8 hours. Patient notes that due to symptoms, they can only sit interrupted due to pain. Cherry Kim notes   sitting rated at a 5/10 painful, difficult and prior to this incident it was 1/10.        Injury: There was no injury related to this episode     Location of Pain: bilateral tailbone and sacrum. . at the following level(s) T5 , T9 , L5 , Sacrum  and PSIS Right   Duration of Pain: one year   Rating of Pain at worst: 5/10  Rating of Pain Currently: 4/10  Symptoms are better with: PT   Symptoms are worse with: sitting and sleeping   Additional Features: none      Health History  as reported by the patient:    How does the patient rate their own health:   Fair    Current or past medical history:   High blood pressure, Overweight and Thyroid problems    Medical allergies  Other: sulfa drugs    Past Traumas/Surgeries  None    Family History  Family History   Problem Relation Age of Onset     Breast Cancer Mother         At 55 yrs     Hypertension Mother      Diabetes Mother         Mid forties     C.A.D. Mother          of CHF,  at 58yrs of ? MI     Connective Tissue Disorder Mother         Lupus      Respiratory Father         d. 79 from complications of pneumonia     Cancer Father         Lung Cancer     Respiratory Brother         COPD- Lung transplant        Cancer Brother         brain cancer       Cancer Maternal Aunt         vaginal     Cancer Maternal Aunt         cervical     Cancer Maternal Aunt         Rectal cancer     Cancer Maternal Grandfather         Stomach cancer in his 40s       Medications:  High blood pressure and Thyroid    Occupation:   shelter     Primary job tasks:   Computer work and Prolonged sitting    Barriers as home/work:   none    Additional health Issues:     None             Cherry was asked to complete the Oswestry Low Back Disability Index and Shamar Start Back screening tool, today in the office.  Disability score: pending%. Keel Start Total Score:pending Sub Score: pending      Review of Systems  Musculoskeletal: as above  Remainder of review of systems is negative including constitutional, CV, pulmonary, GI, Skin and Neurologic except as noted in HPI or medical history.    Past Medical History:   Diagnosis Date     ASCUS on Pap smear     - none since     Connective tissue disorder (H)     undifferentiated     Essential hypertension, benign      Genital herpes      Impaired fasting glucose      Obesity, unspecified      Dede-rectal abscess     Rectal abscess at age 38, occasional rectal bleeding since then     Proteinuria     0.400 g/day negative w/u     Shellfish Allergy      Unspecified hemorrhoids without mention of complication      Unspecified hypothyroidism      Past Surgical History:   Procedure Laterality Date     C NONSPECIFIC PROCEDURE      Tosillectomy     C NONSPECIFIC PROCEDURE      rectal abcess     CRYOTHERAPY, CERVICAL           HC RECONSTR NOSE+BULL SEPTAL REPAIR       1979     Objective  LMP 2007       GENERAL APPEARANCE: healthy, alert and no distress   GAIT: NORMAL  SKIN: no suspicious lesions or  "rashes  NEURO: Normal strength and tone, mentation intact and speech normal  PSYCH:  mentation appears normal and affect normal/bright    Low back exam:    Inspection:  \"     no visible deformity in the low back       normal skin\",    ROM:       limited flexion due to pain       limited extension due to pain    Tender:       paraspinal muscles       B QL     Non Tender:       remainder of lumbar spine    Strength:       hip flexion 5/5 Normal       knee extension 5/5 Normal       ankle dorsiflexion 5/5 Normal       ankle plantarflexion 5/5 Normal       dorsiflexion of the great toe 5/5 Normal    Reflexes:       patellar (L3, L4) 2 bilaterally       achilles tendons (S1) 2 bilaterally    Sensation:      grossly intact throughout lower extremities    Special tests:  Kemps - Right positive, low back pain and Left negative, SLR - Right positive, low back pain and Left negative, Gaenslen's - Right negative and Left negative and Fabere - Right positive, hip pain and Left negative    Segmental spinal dysfunction/restrictions found at:  T5 , T9 , L5 , Sacrum  and PSIS Right     The following soft tissue hypotonicities were observed:Quad lumb: bilateral, referred pain: no    Trigger points were found in:none     Muscle spasm found in:Gluteal and Quad lumb      Radiology:  None     Assessment:    1. Somatic dysfunction of lumbar region    2. Lumbago    3. Somatic dysfunction of sacral region    4. Pain in the coccyx    5. Thoracic segment dysfunction        RX ordered/plan of care  Anticipated outcomes  Possible risks and side effects    After discussing the risk and benefits of care, patient consented to treatment    Prognosis: Good       Patient's condition:  Patient had restrictions pre-manipulation    Treatment effectiveness:  Post manipulation there is better intersegmental movement and Patient claims to feel looser post manipulation      Plan:    Procedures:  Evaluation and Management:  97276 Moderate level exam 30 " min    CMT:  27141 Chiropractic manipulative treatment 3-4 regions performed   Thoracic: Diversified, T5, T9, Prone  Lumbar: Diversified, L5, Side posture  Pelvis: Drop Table, Sacrum , PSIS Right , Prone    Modalities:  None performed this visit    Therapeutic procedures:  53659: Neurological re-education/proprioception training and proper long term sitting posture: Corrected patient's seated posture when sitting for longer than 20 minutes or seated at the computer related to work duties for over 8 hours per day. Fit patient with Nuvia lumbar support for postural re-training with demonstration. Showed patient how to place the support correctly when seated and to increase usage by 2-3 hour increments per day until they are able to sit full time without spinal irritation. Related improper vs. proper sitting to optimal spinal biomechanics using the spine model with demonstration with the purpose of PREVENTING premature spinal degeneration from cumulative static motion. Demonstrated the increase in load and shearing forces on the spine in addition to the cumulative degenerative effects of axial compression on a spine that is chronically slumped and in an 'unlocked' position vs a 'locked' position. Demonstrated the use of a lap top table for lap top computers to prevent excessive cervical flexion of the neck. Demonstrated 'hip hinging' to access the computer when seated rather than thoracic flexion. Gave cervical retraction for proper cervical alignment, anterior deep cervical flexor strengthening and cervical proprioception training with demonstration. Per 10-12 minutes total        Response to Treatment  Reduction in symptoms as reported by patient      Treatment plan and goals:  Goals:  SLEEPING: the patient specific goal is to attain his pre-injury status of 6-7 hours comfortably  SITTING: the patient specific goal is to attain pre-injury status of  8 hours comfortably    Frequency of care  Duration of care is  estimated to be 6-8 weeks, from the initial treatment.  It is estimated that the patient will need a total of 6-8 visits to resolve this episode.  For the initial therapeutic trial of care, the frequency is recommended at 6 X week, once daily.  A reevaluation would be clinically appropriate in 6-8 visits, to determine progress and further course of care.    In-Office Treatment  Evaluation  Spinal Chiropractic Manipulative Therapy:    Postural correction          Recommendations:    Instructions:  use lumar support at all times     Follow-up:    Return to care in 1 week with US   ACP.       Discussed the assessment with the patient.      Disclaimer: This note consists of symbols derived from keyboarding, dictation and/or voice recognition software. As a result, there may be errors in the script that have gone undetected. Please consider this when interpreting information found in this chart.

## 2019-04-15 ENCOUNTER — THERAPY VISIT (OUTPATIENT)
Dept: CHIROPRACTIC MEDICINE | Facility: CLINIC | Age: 59
End: 2019-04-15
Payer: COMMERCIAL

## 2019-04-15 DIAGNOSIS — G89.29 CHRONIC BILATERAL LOW BACK PAIN WITHOUT SCIATICA: ICD-10-CM

## 2019-04-15 DIAGNOSIS — M99.03 SOMATIC DYSFUNCTION OF LUMBAR REGION: Primary | ICD-10-CM

## 2019-04-15 DIAGNOSIS — M54.50 CHRONIC BILATERAL LOW BACK PAIN WITHOUT SCIATICA: ICD-10-CM

## 2019-04-15 DIAGNOSIS — M99.02 THORACIC SEGMENT DYSFUNCTION: ICD-10-CM

## 2019-04-15 DIAGNOSIS — M99.04 SOMATIC DYSFUNCTION OF SACRAL REGION: ICD-10-CM

## 2019-04-15 DIAGNOSIS — M53.3 PAIN IN THE COCCYX: ICD-10-CM

## 2019-04-15 PROCEDURE — 98941 CHIROPRACT MANJ 3-4 REGIONS: CPT | Mod: AT | Performed by: CHIROPRACTOR

## 2019-04-15 PROCEDURE — 97810 ACUP 1/> WO ESTIM 1ST 15 MIN: CPT | Mod: GA | Performed by: CHIROPRACTOR

## 2019-04-16 NOTE — PROGRESS NOTES
Visit #:  2    Subjective:  Cherry Kim is a 58 year old female who is seen in f/u up for:        Somatic dysfunction of lumbar region  Chronic bilateral low back pain without sciatica  Somatic dysfunction of sacral region  Pain in the coccyx  Thoracic segment dysfunction.     Since last visit on 4/8/2019,  Cherry Kim reports:    Area of chief complaint:  Lumbar :  Symptoms are graded at 4/10. The quality is described as stiff, achey, dull.  Motion has increased, but is still not normal. The pt reported at least 30% improvement in the low back and tail bone area after the initial treatment. She states the pain returned slowly. She is using the lumbar support with good results when sitting for prolonged periods. She denies other unusual sx. Patient feels that they are improved due to a reduction in symptoms.     Since last visit the patient feels that they are 30 percent  improved from last visit.       Objective:  The following was observed:    P: palpatory tenderness Gluteal, Lumbar erector spine and Quad lumb Bilaterally  A: static palpation demonstrates intersegmental asymmetry   R: motion palpation notes restricted motion  T: hypertonicity at: Gluteal, Lumbar erector spine and Quad lumb Bilaterally    Segmental spinal dysfunction/restrictions found at:  T6 , T9 , L5 , Sacrum  and PSIS Right       Assessment:    Diagnoses:      1. Somatic dysfunction of lumbar region    2. Chronic bilateral low back pain without sciatica    3. Somatic dysfunction of sacral region    4. Pain in the coccyx    5. Thoracic segment dysfunction        Patient's condition:  Patient had restrictions pre-manipulation    Treatment effectiveness:  Post manipulation there is better intersegmental movement and Patient claims to feel looser post manipulation      Procedures:  CMT:  28906 Chiropractic manipulative treatment 3-4 regions performed   Thoracic: Diversified, T5, T9, Prone  Lumbar: Diversified, L5, Side  posture  Pelvis: Drop Table, Sacrum , PSIS Right , Prone    Modalities:  47016: Acupuncture, for 15 minutes:  Points: Ilda Points in lumbar and sacral spine  Ahsi point in hips and legs  For 15 minutes    Therapeutic procedures:  None    Response to Treatment  Reduction in symptoms as reported by patient    Prognosis: Good    Progress towards Goals: Patient is making progress towards the goal.  Goals:  SLEEPING: the patient specific goal is to attain his pre-injury status of 6-7 hours comfortably  SITTING: the patient specific goal is to attain pre-injury status of  8 hours comfortably         Recommendations:    Instructions:  continue to use the lumbar support     Follow-up:    Return to care in 1 week with ACP.

## 2019-04-22 ENCOUNTER — THERAPY VISIT (OUTPATIENT)
Dept: CHIROPRACTIC MEDICINE | Facility: CLINIC | Age: 59
End: 2019-04-22
Payer: COMMERCIAL

## 2019-04-22 DIAGNOSIS — M99.03 SOMATIC DYSFUNCTION OF LUMBAR REGION: Primary | ICD-10-CM

## 2019-04-22 DIAGNOSIS — M99.04 SOMATIC DYSFUNCTION OF SACRAL REGION: ICD-10-CM

## 2019-04-22 DIAGNOSIS — M54.50 CHRONIC BILATERAL LOW BACK PAIN WITHOUT SCIATICA: ICD-10-CM

## 2019-04-22 DIAGNOSIS — M53.3 PAIN IN THE COCCYX: ICD-10-CM

## 2019-04-22 DIAGNOSIS — M99.02 THORACIC SEGMENT DYSFUNCTION: ICD-10-CM

## 2019-04-22 DIAGNOSIS — G89.29 CHRONIC BILATERAL LOW BACK PAIN WITHOUT SCIATICA: ICD-10-CM

## 2019-04-22 PROCEDURE — 97810 ACUP 1/> WO ESTIM 1ST 15 MIN: CPT | Mod: GA | Performed by: CHIROPRACTOR

## 2019-04-22 PROCEDURE — 98941 CHIROPRACT MANJ 3-4 REGIONS: CPT | Mod: AT | Performed by: CHIROPRACTOR

## 2019-04-22 NOTE — PROGRESS NOTES
Visit #:  3    Subjective:  Cherry Kim is a 58 year old female who is seen in f/u up for:        Somatic dysfunction of lumbar region  Chronic bilateral low back pain without sciatica  Somatic dysfunction of sacral region  Pain in the coccyx  Thoracic segment dysfunction.     Since last visit ,   Cherry Kim reports:    Area of chief complaint:  Lumbar :  Symptoms are graded at 5/10. The quality is described as stiff, achey, dull.  Motion has increased, but is still not normal. The pt reported less than  30% improvement in the low back and tail bone area after the initial treatment. She states 'she had a bad week'. There was no particular activity or incident that could have caused the px. She notes increased pain in the tailbone region today. The pt denies weakness or other unusual sx.     Since last visit the patient feels that they are less than  30 percent  improved from last visit.       Objective:  The following was observed:    P: palpatory tenderness Gluteal, Lumbar erector spine and Quad lumb Bilaterally  A: static palpation demonstrates intersegmental asymmetry   R: motion palpation notes restricted motion  T: hypertonicity at: Gluteal, Lumbar erector spine and Quad lumb Bilaterally    Segmental spinal dysfunction/restrictions found at:  T6 , T9 , L5 , Sacrum  and PSIS Right       Assessment:    Diagnoses:      1. Somatic dysfunction of lumbar region    2. Chronic bilateral low back pain without sciatica    3. Somatic dysfunction of sacral region    4. Pain in the coccyx    5. Thoracic segment dysfunction        Patient's condition:  Exacerbation /regression    Treatment effectiveness:  Post manipulation there is better intersegmental movement and Patient claims to feel looser post manipulation      Procedures:  CMT:  13085 Chiropractic manipulative treatment 3-4 regions performed   Thoracic: Diversified, T5, T9, Prone  Lumbar: Diversified, L5, Side posture  Pelvis: Drop Table,  Sacrum , PSIS Right , Prone    Modalities:  42855: Acupuncture, for 15 minutes:  Points: Ilda Points in lumbar and sacral spine  Ahsi point in hips and legs  For 15 minutes    Therapeutic procedures:  None    Response to Treatment  Reduction in symptoms as reported by patient    Prognosis: Good    Progress towards Goals: Patient is making progress towards the goal.  Goals:  SLEEPING: the patient specific goal is to attain his pre-injury status of 6-7 hours comfortably  SITTING: the patient specific goal is to attain pre-injury status of  8 hours comfortably         Recommendations:    Instructions:  continue to use the lumbar support     Follow-up:    Return to care in 1 week with ACP.

## 2019-04-23 DIAGNOSIS — I10 ESSENTIAL HYPERTENSION, BENIGN: ICD-10-CM

## 2019-04-23 DIAGNOSIS — E78.5 HYPERLIPIDEMIA LDL GOAL <130: ICD-10-CM

## 2019-04-23 DIAGNOSIS — E03.4 HYPOTHYROIDISM DUE TO ACQUIRED ATROPHY OF THYROID: ICD-10-CM

## 2019-04-23 NOTE — TELEPHONE ENCOUNTER
"Requested Prescriptions   Pending Prescriptions Disp Refills     spironolactone-HCTZ (ALDACTAZIDE) 25-25 MG tablet [Pharmacy Med Name: SPIRONOLACTONE-HCTZ 25-25 TAB] 90 tablet 2     Sig: TAKE ONE TABLET BY MOUTH ONCE DAILY. REPLACES PLAIN HCTZ.       Diuretics (Including Combos) Protocol Passed - 4/23/2019  4:30 AM        Passed - Blood pressure under 140/90 in past 12 months     BP Readings from Last 3 Encounters:   07/31/18 128/80   05/15/18 124/84   05/09/18 140/84                 Passed - Recent (12 mo) or future (30 days) visit within the authorizing provider's specialty     Patient had office visit in the last 12 months or has a visit in the next 30 days with authorizing provider or within the authorizing provider's specialty.  See \"Patient Info\" tab in inbasket, or \"Choose Columns\" in Meds & Orders section of the refill encounter.              Passed - Medication is active on med list        Passed - Patient is age 18 or older        Passed - No active pregancy on record        Passed - Normal serum creatinine on file in past 12 months     Recent Labs   Lab Test 07/19/18  1645   CR 0.74              Passed - Normal serum potassium on file in past 12 months     Recent Labs   Lab Test 07/19/18  1645   POTASSIUM 3.6                    Passed - Normal serum sodium on file in past 12 months     Recent Labs   Lab Test 07/19/18  1645                 Passed - No positive pregnancy test in past 12 months        metoprolol tartrate (LOPRESSOR) 50 MG tablet [Pharmacy Med Name: METOPROLOL TARTRATE 50 MG TAB] 180 tablet 1     Sig: TAKE 1 TABLET (50 MG) BY MOUTH 2 TIMES DAILY       Beta-Blockers Protocol Passed - 4/23/2019  4:30 AM        Passed - Blood pressure under 140/90 in past 12 months     BP Readings from Last 3 Encounters:   07/31/18 128/80   05/15/18 124/84   05/09/18 140/84                 Passed - Patient is age 6 or older        Passed - Recent (12 mo) or future (30 days) visit within the authorizing " "provider's specialty     Patient had office visit in the last 12 months or has a visit in the next 30 days with authorizing provider or within the authorizing provider's specialty.  See \"Patient Info\" tab in inbasket, or \"Choose Columns\" in Meds & Orders section of the refill encounter.              Passed - Medication is active on med list        simvastatin (ZOCOR) 40 MG tablet [Pharmacy Med Name: SIMVASTATIN 40 MG TABLET] 90 tablet 3     Sig: TAKE 1 TABLET BY MOUTH AT BEDTIME       Statins Protocol Passed - 4/23/2019  4:30 AM        Passed - LDL on file in past 12 months     Recent Labs   Lab Test 05/09/18  0911   LDL 98             Passed - No abnormal creatine kinase in past 12 months     No lab results found.             Passed - Recent (12 mo) or future (30 days) visit within the authorizing provider's specialty     Patient had office visit in the last 12 months or has a visit in the next 30 days with authorizing provider or within the authorizing provider's specialty.  See \"Patient Info\" tab in [place], or \"Choose Columns\" in Meds & Orders section of the refill encounter.              Passed - Medication is active on med list        Passed - Patient is age 18 or older        Passed - No active pregnancy on record        Passed - No positive pregnancy test in past 12 months        levothyroxine (SYNTHROID/LEVOTHROID) 137 MCG tablet [Pharmacy Med Name: LEVOTHYROXINE 137 MCG TABLET] 90 tablet 3     Sig: TAKE 1 TABLET (137 MCG) BY MOUTH DAILY       Thyroid Protocol Passed - 4/23/2019  4:30 AM        Passed - Patient is 12 years or older        Passed - Recent (12 mo) or future (30 days) visit within the authorizing provider's specialty     Patient had office visit in the last 12 months or has a visit in the next 30 days with authorizing provider or within the authorizing provider's specialty.  See \"Patient Info\" tab in inbasket, or \"Choose Columns\" in Meds & Orders section of the refill encounter.              " "Passed - Medication is active on med list        Passed - Normal TSH on file in past 12 months     Recent Labs   Lab Test 05/09/18  0911   TSH 0.69      Requested Prescriptions   Pending Prescriptions Disp Refills     spironolactone-HCTZ (ALDACTAZIDE) 25-25 MG tablet [Pharmacy Med Name: SPIRONOLACTONE-HCTZ 25-25 TAB] 90 tablet 2     Sig: TAKE ONE TABLET BY MOUTH ONCE DAILY. REPLACES PLAIN HCTZ.       Diuretics (Including Combos) Protocol Passed - 4/23/2019  4:30 AM        Passed - Blood pressure under 140/90 in past 12 months     BP Readings from Last 3 Encounters:   07/31/18 128/80   05/15/18 124/84   05/09/18 140/84                 Passed - Recent (12 mo) or future (30 days) visit within the authorizing provider's specialty     Patient had office visit in the last 12 months or has a visit in the next 30 days with authorizing provider or within the authorizing provider's specialty.  See \"Patient Info\" tab in inbasket, or \"Choose Columns\" in Meds & Orders section of the refill encounter.              Passed - Medication is active on med list        Passed - Patient is age 18 or older        Passed - No active pregancy on record        Passed - Normal serum creatinine on file in past 12 months     Recent Labs   Lab Test 07/19/18  1645   CR 0.74              Passed - Normal serum potassium on file in past 12 months     Recent Labs   Lab Test 07/19/18  1645   POTASSIUM 3.6                    Passed - Normal serum sodium on file in past 12 months     Recent Labs   Lab Test 07/19/18  1645                 Passed - No positive pregnancy test in past 12 months        metoprolol tartrate (LOPRESSOR) 50 MG tablet [Pharmacy Med Name: METOPROLOL TARTRATE 50 MG TAB] 180 tablet 1     Sig: TAKE 1 TABLET (50 MG) BY MOUTH 2 TIMES DAILY       Beta-Blockers Protocol Passed - 4/23/2019  4:30 AM        Passed - Blood pressure under 140/90 in past 12 months     BP Readings from Last 3 Encounters:   07/31/18 128/80   05/15/18 124/84 " "  05/09/18 140/84                 Passed - Patient is age 6 or older        Passed - Recent (12 mo) or future (30 days) visit within the authorizing provider's specialty     Patient had office visit in the last 12 months or has a visit in the next 30 days with authorizing provider or within the authorizing provider's specialty.  See \"Patient Info\" tab in inbasket, or \"Choose Columns\" in Meds & Orders section of the refill encounter.              Passed - Medication is active on med list        simvastatin (ZOCOR) 40 MG tablet [Pharmacy Med Name: SIMVASTATIN 40 MG TABLET] 90 tablet 3     Sig: TAKE 1 TABLET BY MOUTH AT BEDTIME       Statins Protocol Passed - 4/23/2019  4:30 AM        Passed - LDL on file in past 12 months     Recent Labs   Lab Test 05/09/18  0911   LDL 98             Passed - No abnormal creatine kinase in past 12 months     No lab results found.             Passed - Recent (12 mo) or future (30 days) visit within the authorizing provider's specialty     Patient had office visit in the last 12 months or has a visit in the next 30 days with authorizing provider or within the authorizing provider's specialty.  See \"Patient Info\" tab in inbasket, or \"Choose Columns\" in Meds & Orders section of the refill encounter.              Passed - Medication is active on med list        Passed - Patient is age 18 or older        Passed - No active pregnancy on record        Passed - No positive pregnancy test in past 12 months        levothyroxine (SYNTHROID/LEVOTHROID) 137 MCG tablet [Pharmacy Med Name: LEVOTHYROXINE 137 MCG TABLET] 90 tablet 3     Sig: TAKE 1 TABLET (137 MCG) BY MOUTH DAILY       Thyroid Protocol Passed - 4/23/2019  4:30 AM        Passed - Patient is 12 years or older        Passed - Recent (12 mo) or future (30 days) visit within the authorizing provider's specialty     Patient had office visit in the last 12 months or has a visit in the next 30 days with authorizing provider or within the " "authorizing provider's specialty.  See \"Patient Info\" tab in inbasket, or \"Choose Columns\" in Meds & Orders section of the refill encounter.              Passed - Medication is active on med list        Passed - Normal TSH on file in past 12 months     Recent Labs   Lab Test 05/09/18  0911   TSH 0.69      Requested Prescriptions   Pending Prescriptions Disp Refills     spironolactone-HCTZ (ALDACTAZIDE) 25-25 MG tablet [Pharmacy Med Name: SPIRONOLACTONE-HCTZ 25-25 TAB] 90 tablet 2     Sig: TAKE ONE TABLET BY MOUTH ONCE DAILY. REPLACES PLAIN HCTZ.       Diuretics (Including Combos) Protocol Passed - 4/23/2019  4:30 AM        Passed - Blood pressure under 140/90 in past 12 months     BP Readings from Last 3 Encounters:   07/31/18 128/80   05/15/18 124/84   05/09/18 140/84                 Passed - Recent (12 mo) or future (30 days) visit within the authorizing provider's specialty     Patient had office visit in the last 12 months or has a visit in the next 30 days with authorizing provider or within the authorizing provider's specialty.  See \"Patient Info\" tab in inbasket, or \"Choose Columns\" in Meds & Orders section of the refill encounter.              Passed - Medication is active on med list        Passed - Patient is age 18 or older        Passed - No active pregancy on record        Passed - Normal serum creatinine on file in past 12 months     Recent Labs   Lab Test 07/19/18  1645   CR 0.74              Passed - Normal serum potassium on file in past 12 months     Recent Labs   Lab Test 07/19/18  1645   POTASSIUM 3.6                    Passed - Normal serum sodium on file in past 12 months     Recent Labs   Lab Test 07/19/18  1645                 Passed - No positive pregnancy test in past 12 months        metoprolol tartrate (LOPRESSOR) 50 MG tablet [Pharmacy Med Name: METOPROLOL TARTRATE 50 MG TAB] 180 tablet 1     Sig: TAKE 1 TABLET (50 MG) BY MOUTH 2 TIMES DAILY       Beta-Blockers Protocol Passed - " "4/23/2019  4:30 AM        Passed - Blood pressure under 140/90 in past 12 months     BP Readings from Last 3 Encounters:   07/31/18 128/80   05/15/18 124/84   05/09/18 140/84                 Passed - Patient is age 6 or older        Passed - Recent (12 mo) or future (30 days) visit within the authorizing provider's specialty     Patient had office visit in the last 12 months or has a visit in the next 30 days with authorizing provider or within the authorizing provider's specialty.  See \"Patient Info\" tab in inbasket, or \"Choose Columns\" in Meds & Orders section of the refill encounter.              Passed - Medication is active on med list        simvastatin (ZOCOR) 40 MG tablet [Pharmacy Med Name: SIMVASTATIN 40 MG TABLET] 90 tablet 3     Sig: TAKE 1 TABLET BY MOUTH AT BEDTIME       Statins Protocol Passed - 4/23/2019  4:30 AM        Passed - LDL on file in past 12 months     Recent Labs   Lab Test 05/09/18  0911   LDL 98             Passed - No abnormal creatine kinase in past 12 months     No lab results found.             Passed - Recent (12 mo) or future (30 days) visit within the authorizing provider's specialty     Patient had office visit in the last 12 months or has a visit in the next 30 days with authorizing provider or within the authorizing provider's specialty.  See \"Patient Info\" tab in [facility], or \"Choose Columns\" in Meds & Orders section of the refill encounter.              Passed - Medication is active on med list        Passed - Patient is age 18 or older        Passed - No active pregnancy on record        Passed - No positive pregnancy test in past 12 months        levothyroxine (SYNTHROID/LEVOTHROID) 137 MCG tablet [Pharmacy Med Name: LEVOTHYROXINE 137 MCG TABLET] 90 tablet 3     Sig: TAKE 1 TABLET (137 MCG) BY MOUTH DAILY       Thyroid Protocol Passed - 4/23/2019  4:30 AM        Passed - Patient is 12 years or older        Passed - Recent (12 mo) or future (30 days) visit within the " "authorizing provider's specialty     Patient had office visit in the last 12 months or has a visit in the next 30 days with authorizing provider or within the authorizing provider's specialty.  See \"Patient Info\" tab in inbasket, or \"Choose Columns\" in Meds & Orders section of the refill encounter.              Passed - Medication is active on med list        Passed - Normal TSH on file in past 12 months     Recent Labs   Lab Test 05/09/18  0911   TSH 0.69      Requested Prescriptions   Pending Prescriptions Disp Refills     spironolactone-HCTZ (ALDACTAZIDE) 25-25 MG tablet [Pharmacy Med Name: SPIRONOLACTONE-HCTZ 25-25 TAB] 90 tablet 2     Sig: TAKE ONE TABLET BY MOUTH ONCE DAILY. REPLACES PLAIN HCTZ.       Diuretics (Including Combos) Protocol Passed - 4/23/2019  4:30 AM        Passed - Blood pressure under 140/90 in past 12 months     BP Readings from Last 3 Encounters:   07/31/18 128/80   05/15/18 124/84   05/09/18 140/84                 Passed - Recent (12 mo) or future (30 days) visit within the authorizing provider's specialty     Patient had office visit in the last 12 months or has a visit in the next 30 days with authorizing provider or within the authorizing provider's specialty.  See \"Patient Info\" tab in inbasket, or \"Choose Columns\" in Meds & Orders section of the refill encounter.              Passed - Medication is active on med list        Passed - Patient is age 18 or older        Passed - No active pregancy on record        Passed - Normal serum creatinine on file in past 12 months     Recent Labs   Lab Test 07/19/18  1645   CR 0.74              Passed - Normal serum potassium on file in past 12 months     Recent Labs   Lab Test 07/19/18  1645   POTASSIUM 3.6                    Passed - Normal serum sodium on file in past 12 months     Recent Labs   Lab Test 07/19/18  1645                 Passed - No positive pregnancy test in past 12 months        metoprolol tartrate (LOPRESSOR) 50 MG tablet " "[Pharmacy Med Name: METOPROLOL TARTRATE 50 MG TAB] 180 tablet 1     Sig: TAKE 1 TABLET (50 MG) BY MOUTH 2 TIMES DAILY       Beta-Blockers Protocol Passed - 4/23/2019  4:30 AM        Passed - Blood pressure under 140/90 in past 12 months     BP Readings from Last 3 Encounters:   07/31/18 128/80   05/15/18 124/84   05/09/18 140/84                 Passed - Patient is age 6 or older        Passed - Recent (12 mo) or future (30 days) visit within the authorizing provider's specialty     Patient had office visit in the last 12 months or has a visit in the next 30 days with authorizing provider or within the authorizing provider's specialty.  See \"Patient Info\" tab in inbasket, or \"Choose Columns\" in Meds & Orders section of the refill encounter.              Passed - Medication is active on med list        simvastatin (ZOCOR) 40 MG tablet [Pharmacy Med Name: SIMVASTATIN 40 MG TABLET] 90 tablet 3     Sig: TAKE 1 TABLET BY MOUTH AT BEDTIME       Statins Protocol Passed - 4/23/2019  4:30 AM        Passed - LDL on file in past 12 months     Recent Labs   Lab Test 05/09/18  0911   LDL 98             Passed - No abnormal creatine kinase in past 12 months     No lab results found.             Passed - Recent (12 mo) or future (30 days) visit within the authorizing provider's specialty     Patient had office visit in the last 12 months or has a visit in the next 30 days with authorizing provider or within the authorizing provider's specialty.  See \"Patient Info\" tab in inbasket, or \"Choose Columns\" in Meds & Orders section of the refill encounter.              Passed - Medication is active on med list        Passed - Patient is age 18 or older        Passed - No active pregnancy on record        Passed - No positive pregnancy test in past 12 months        levothyroxine (SYNTHROID/LEVOTHROID) 137 MCG tablet [Pharmacy Med Name: LEVOTHYROXINE 137 MCG TABLET] 90 tablet 3     Sig: TAKE 1 TABLET (137 MCG) BY MOUTH DAILY       Thyroid " "Protocol Passed - 4/23/2019  4:30 AM        Passed - Patient is 12 years or older        Passed - Recent (12 mo) or future (30 days) visit within the authorizing provider's specialty     Patient had office visit in the last 12 months or has a visit in the next 30 days with authorizing provider or within the authorizing provider's specialty.  See \"Patient Info\" tab in inbasket, or \"Choose Columns\" in Meds & Orders section of the refill encounter.              Passed - Medication is active on med list        Passed - Normal TSH on file in past 12 months     Recent Labs   Lab Test 05/09/18  0911   TSH 0.69              Passed - No active pregnancy on record     If patient is pregnant or has had a positive pregnancy test, please check TSH.          Passed - No positive pregnancy test in past 12 months     If patient is pregnant or has had a positive pregnancy test, please check TSH.          Last Written Prescription Date:  8/7/18  Last Fill Quantity: 90,  # refills: 2   Last office visit: 5/15/2018 with prescribing provider:  5/15/18   Future Office Visit:              Passed - No active pregnancy on record     If patient is pregnant or has had a positive pregnancy test, please check TSH.          Passed - No positive pregnancy test in past 12 months     If patient is pregnant or has had a positive pregnancy test, please check TSH.          Last Written Prescription Date:  11/1/18  Last Fill Quantity: 180,  # refills: 1   Last office visit: 5/15/2018 with prescribing provider:  5/15/18   Future Office Visit:            Passed - No active pregnancy on record     If patient is pregnant or has had a positive pregnancy test, please check TSH.          Passed - No positive pregnancy test in past 12 months     If patient is pregnant or has had a positive pregnancy test, please check TSH.          Last Written Prescription Date:  5/9/18  Last Fill Quantity: 90,  # refills: 3   Last office visit: 5/15/2018 with prescribing " provider:  5/15/18   Future Office Visit:            Passed - No active pregnancy on record     If patient is pregnant or has had a positive pregnancy test, please check TSH.          Passed - No positive pregnancy test in past 12 months     If patient is pregnant or has had a positive pregnancy test, please check TSH.          Last Written Prescription Date:  5/9/18  Last Fill Quantity: 90,  # refills: 3   Last office visit: 5/15/2018 with prescribing provider:  5/15/18   Future Office Visit:

## 2019-04-24 RX ORDER — SIMVASTATIN 40 MG
TABLET ORAL
Qty: 90 TABLET | Refills: 0 | Status: SHIPPED | OUTPATIENT
Start: 2019-04-24 | End: 2019-07-24

## 2019-04-24 RX ORDER — SPIRONOLACTONE AND HYDROCHLOROTHIAZIDE 25; 25 MG/1; MG/1
TABLET ORAL
Qty: 90 TABLET | Refills: 0 | Status: SHIPPED | OUTPATIENT
Start: 2019-04-24 | End: 2019-07-24

## 2019-04-24 RX ORDER — METOPROLOL TARTRATE 50 MG
TABLET ORAL
Qty: 180 TABLET | Refills: 0 | Status: SHIPPED | OUTPATIENT
Start: 2019-04-24 | End: 2019-07-24

## 2019-04-24 RX ORDER — LEVOTHYROXINE SODIUM 137 UG/1
137 TABLET ORAL DAILY
Qty: 90 TABLET | Refills: 0 | Status: SHIPPED | OUTPATIENT
Start: 2019-04-24 | End: 2019-07-24

## 2019-04-29 ENCOUNTER — THERAPY VISIT (OUTPATIENT)
Dept: CHIROPRACTIC MEDICINE | Facility: CLINIC | Age: 59
End: 2019-04-29
Payer: COMMERCIAL

## 2019-04-29 DIAGNOSIS — M99.04 SOMATIC DYSFUNCTION OF SACRAL REGION: ICD-10-CM

## 2019-04-29 DIAGNOSIS — G89.29 CHRONIC BILATERAL LOW BACK PAIN WITHOUT SCIATICA: ICD-10-CM

## 2019-04-29 DIAGNOSIS — M54.50 CHRONIC BILATERAL LOW BACK PAIN WITHOUT SCIATICA: ICD-10-CM

## 2019-04-29 DIAGNOSIS — M99.03 SOMATIC DYSFUNCTION OF LUMBAR REGION: Primary | ICD-10-CM

## 2019-04-29 DIAGNOSIS — M53.3 PAIN IN THE COCCYX: ICD-10-CM

## 2019-04-29 DIAGNOSIS — M99.02 THORACIC SEGMENT DYSFUNCTION: ICD-10-CM

## 2019-04-29 PROCEDURE — 98941 CHIROPRACT MANJ 3-4 REGIONS: CPT | Mod: AT | Performed by: CHIROPRACTOR

## 2019-04-29 PROCEDURE — 97810 ACUP 1/> WO ESTIM 1ST 15 MIN: CPT | Mod: GA | Performed by: CHIROPRACTOR

## 2019-04-29 NOTE — PROGRESS NOTES
Visit #:  4    Subjective:  Cherry Kim is a 58 year old female who is seen in f/u up for:        Somatic dysfunction of lumbar region  Chronic bilateral low back pain without sciatica  Somatic dysfunction of sacral region  Pain in the coccyx  Thoracic segment dysfunction.     Since last visit ,   Cherry Kim reports:    Area of chief complaint:  Lumbar :  Symptoms are graded at 5/10. The quality is described as stiff, achey, dull.  Motion has increased, but is still not normal. The pt reported less than  30% improvement in the low back and tail bone area after the initial treatment. She states she was very sore in tailbone area in addition to slightly bruised. She states the px 'was different' than her 'usual' pain in the tailbone.     Since last visit the patient feels that they are less than  30 percent  improved from last visit-no change        Objective:  The following was observed:    P: palpatory tenderness Gluteal, Lumbar erector spine and Quad lumb Bilaterally  A: static palpation demonstrates intersegmental asymmetry   R: motion palpation notes restricted motion  T: hypertonicity at: Gluteal, Lumbar erector spine and Quad lumb Bilaterally    Segmental spinal dysfunction/restrictions found at:  T6 , T9 , L5 , Sacrum  and PSIS Right       Assessment:    Diagnoses:      1. Somatic dysfunction of lumbar region    2. Chronic bilateral low back pain without sciatica    3. Somatic dysfunction of sacral region    4. Pain in the coccyx    5. Thoracic segment dysfunction        Patient's condition:  Exacerbation /regression    Treatment effectiveness:  Post manipulation there is better intersegmental movement and Patient claims to feel looser post manipulation      Procedures:  CMT:  36337 Chiropractic manipulative treatment 3-4 regions performed   Thoracic: Diversified, T5, T9, Prone  Lumbar: Diversified, L5, Side posture  Pelvis: Drop Table, Sacrum , PSIS Right ,  Prone    Modalities:  73289: Acupuncture, for 15 minutes:  Points: Ilda Points in lumbar and sacral spine  Ahsi point in hips and legs  For 15 minutes    Therapeutic procedures:  None    Response to Treatment  Reduction in symptoms as reported by patient    Prognosis: Good    Progress towards Goals: Patient is making progress towards the goal.  Goals:  SLEEPING: the patient specific goal is to attain his pre-injury status of 6-7 hours comfortably  SITTING: the patient specific goal is to attain pre-injury status of  8 hours comfortably         Recommendations:    Instructions:  continue to use the lumbar support     Follow-up:    Return to care in 1 week with ACP.

## 2019-05-06 ENCOUNTER — THERAPY VISIT (OUTPATIENT)
Dept: CHIROPRACTIC MEDICINE | Facility: CLINIC | Age: 59
End: 2019-05-06
Payer: COMMERCIAL

## 2019-05-06 DIAGNOSIS — M54.50 CHRONIC BILATERAL LOW BACK PAIN WITHOUT SCIATICA: ICD-10-CM

## 2019-05-06 DIAGNOSIS — M99.03 SOMATIC DYSFUNCTION OF LUMBAR REGION: Primary | ICD-10-CM

## 2019-05-06 DIAGNOSIS — M99.02 THORACIC SEGMENT DYSFUNCTION: ICD-10-CM

## 2019-05-06 DIAGNOSIS — M53.3 PAIN IN THE COCCYX: ICD-10-CM

## 2019-05-06 DIAGNOSIS — G89.29 CHRONIC BILATERAL LOW BACK PAIN WITHOUT SCIATICA: ICD-10-CM

## 2019-05-06 DIAGNOSIS — M99.04 SOMATIC DYSFUNCTION OF SACRAL REGION: ICD-10-CM

## 2019-05-06 PROCEDURE — 97810 ACUP 1/> WO ESTIM 1ST 15 MIN: CPT | Mod: GA | Performed by: CHIROPRACTOR

## 2019-05-06 PROCEDURE — 98941 CHIROPRACT MANJ 3-4 REGIONS: CPT | Mod: AT | Performed by: CHIROPRACTOR

## 2019-05-06 PROCEDURE — 97110 THERAPEUTIC EXERCISES: CPT | Performed by: CHIROPRACTOR

## 2019-05-06 NOTE — PROGRESS NOTES
Visit #:  5    Subjective:  Cherry Kim is a 58 year old female who is seen in f/u up for:        Somatic dysfunction of lumbar region  Chronic bilateral low back pain without sciatica  Somatic dysfunction of sacral region  Thoracic segment dysfunction  Pain in the coccyx.     Since last visit ,   Cherry Kim reports:    Area of chief complaint:  Lumbar :  Symptoms are graded at 5/10. The quality is described as stiff, achey, dull.  Motion has increased, but is still not normal. The pt reports 5% improvement since initial presentation. She did not feel as sore in the tailbone and coccyx area as the prior treatment. She denies soreness post treatment. She reports an ache when standing in addition to sitting. The pain in the sacral area is intermittent and she will feel it more on the L side. The pt denies weakness or other unusual sx.     Since last visit the patient feels that they are less than  30 percent  improved from last visit-no change        Objective:  The following was observed:    P: palpatory tenderness Gluteal, Lumbar erector spine and Quad lumb Bilaterally  A: static palpation demonstrates intersegmental asymmetry   R: motion palpation notes restricted motion  T: hypertonicity at: Gluteal, Lumbar erector spine and Quad lumb Bilaterally    Segmental spinal dysfunction/restrictions found at:  T6 , T9 , L5 , Sacrum  and PSIS Right       Assessment:    Diagnoses:      1. Somatic dysfunction of lumbar region    2. Chronic bilateral low back pain without sciatica    3. Somatic dysfunction of sacral region    4. Thoracic segment dysfunction    5. Pain in the coccyx        Patient's condition:  Pt responding slowly to treatment    Treatment effectiveness:  Post manipulation there is better intersegmental movement and Patient claims to feel looser post manipulation      Procedures:  CMT:  22117 Chiropractic manipulative treatment 3-4 regions performed   Thoracic: Diversified, T5, T9,  Prone  Lumbar: Diversified, L5, Side posture  Pelvis: Drop Table, Sacrum , PSIS Right , Prone    Modalities:  20530: Acupuncture, for 15 minutes:  Points: Ilda Points in lumbar and sacral spine  Ahsi point in hips and legs  For 15 minutes    Therapeutic procedures:  Gave supine piriformis stretch with demonstration   Gave seated piriformis stretching   Gave hamstring stretch supine with use of a belt or towel for maximum stretch. Gave standing hamstring stretch and nerve traction as per stretch protocol with demonstration.   Per 8 minutes      Response to Treatment  Reduction in symptoms as reported by patient    Prognosis: Good    Progress towards Goals: Patient is making progress towards the goal.  Goals:  SLEEPING: the patient specific goal is to attain his pre-injury status of 6-7 hours comfortably  SITTING: the patient specific goal is to attain pre-injury status of  8 hours comfortably         Recommendations:    Instructions:  continue to use the lumbar support     Follow-up:    Return to care in 1 week with ACP.

## 2019-05-10 ENCOUNTER — TELEPHONE (OUTPATIENT)
Dept: INTERNAL MEDICINE | Facility: CLINIC | Age: 59
End: 2019-05-10

## 2019-05-10 ENCOUNTER — MEDICAL CORRESPONDENCE (OUTPATIENT)
Dept: HEALTH INFORMATION MANAGEMENT | Facility: CLINIC | Age: 59
End: 2019-05-10

## 2019-05-20 ENCOUNTER — THERAPY VISIT (OUTPATIENT)
Dept: CHIROPRACTIC MEDICINE | Facility: CLINIC | Age: 59
End: 2019-05-20
Payer: COMMERCIAL

## 2019-05-20 DIAGNOSIS — M53.3 PAIN IN THE COCCYX: ICD-10-CM

## 2019-05-20 DIAGNOSIS — M99.02 THORACIC SEGMENT DYSFUNCTION: ICD-10-CM

## 2019-05-20 DIAGNOSIS — M99.04 SOMATIC DYSFUNCTION OF SACRAL REGION: ICD-10-CM

## 2019-05-20 DIAGNOSIS — M99.03 SOMATIC DYSFUNCTION OF LUMBAR REGION: Primary | ICD-10-CM

## 2019-05-20 DIAGNOSIS — M54.50 CHRONIC BILATERAL LOW BACK PAIN WITHOUT SCIATICA: ICD-10-CM

## 2019-05-20 DIAGNOSIS — G89.29 CHRONIC BILATERAL LOW BACK PAIN WITHOUT SCIATICA: ICD-10-CM

## 2019-05-20 PROCEDURE — 97810 ACUP 1/> WO ESTIM 1ST 15 MIN: CPT | Mod: GA | Performed by: CHIROPRACTOR

## 2019-05-20 PROCEDURE — 98941 CHIROPRACT MANJ 3-4 REGIONS: CPT | Mod: AT | Performed by: CHIROPRACTOR

## 2019-05-20 NOTE — PROGRESS NOTES
Visit #:  5    Subjective:  Cherry Kim is a 58 year old female who is seen in f/u up for:        Somatic dysfunction of lumbar region  Chronic bilateral low back pain without sciatica  Somatic dysfunction of sacral region  Thoracic segment dysfunction  Pain in the coccyx.     Since last visit ,   Cherry Kim reports:    Area of chief complaint:  Lumbar :  Symptoms are graded at 5/10. The quality is described as stiff, achey, dull.  Motion has increased, but is still not normal. The pt reports 40% improvement since initial presentation. She is sitting much better when working. She notes less overall pain in the tailbone on the R side. She denies feeling severe pain when at home. The pt feels she is improving. She notes tension in the sacral area. She denies weakness or other unusual sx.     Since last visit the patient feels that they are less than  40 percent  improved from last visit       Objective:  The following was observed:    P: palpatory tenderness Gluteal, Lumbar erector spine and Quad lumb Bilaterally  A: static palpation demonstrates intersegmental asymmetry   R: motion palpation notes restricted motion  T: hypertonicity at: Gluteal, Lumbar erector spine and Quad lumb Bilaterally    Segmental spinal dysfunction/restrictions found at:  T6 , T9 , L5 , Sacrum  and PSIS Right       Assessment:    Diagnoses:      1. Somatic dysfunction of lumbar region    2. Chronic bilateral low back pain without sciatica    3. Somatic dysfunction of sacral region    4. Thoracic segment dysfunction    5. Pain in the coccyx        Patient's condition:  Pt responding slowly to treatment    Treatment effectiveness:  Post manipulation there is better intersegmental movement and Patient claims to feel looser post manipulation      Procedures:  CMT:  21662 Chiropractic manipulative treatment 3-4 regions performed   Thoracic: Diversified, T5, T9, Prone  Lumbar: Diversified, L5, Side posture  Pelvis: Drop  Table, Sacrum , PSIS Right , Prone    Modalities:  03027: Acupuncture, for 15 minutes:  Points: Ilda Points in lumbar and sacral spine  Ahsi point in hips and legs  For 15 minutes    Therapeutic procedures:  None     Response to Treatment  Reduction in symptoms as reported by patient    Prognosis: Good    Progress towards Goals: Patient is making progress towards the goal.  Goals:  SLEEPING: the patient specific goal is to attain his pre-injury status of 6-7 hours comfortably  SITTING: the patient specific goal is to attain pre-injury status of  8 hours comfortably         Recommendations:    Instructions:  continue to use the lumbar support     Follow-up:    Return to care in 2 week with ACP.

## 2019-06-03 ENCOUNTER — THERAPY VISIT (OUTPATIENT)
Dept: CHIROPRACTIC MEDICINE | Facility: CLINIC | Age: 59
End: 2019-06-03
Payer: COMMERCIAL

## 2019-06-03 DIAGNOSIS — G89.29 CHRONIC BILATERAL LOW BACK PAIN WITHOUT SCIATICA: ICD-10-CM

## 2019-06-03 DIAGNOSIS — M99.04 SOMATIC DYSFUNCTION OF SACRAL REGION: ICD-10-CM

## 2019-06-03 DIAGNOSIS — M99.02 THORACIC SEGMENT DYSFUNCTION: ICD-10-CM

## 2019-06-03 DIAGNOSIS — M54.50 CHRONIC BILATERAL LOW BACK PAIN WITHOUT SCIATICA: ICD-10-CM

## 2019-06-03 DIAGNOSIS — M53.3 PAIN IN THE COCCYX: ICD-10-CM

## 2019-06-03 DIAGNOSIS — M99.03 SOMATIC DYSFUNCTION OF LUMBAR REGION: Primary | ICD-10-CM

## 2019-06-03 PROCEDURE — 98941 CHIROPRACT MANJ 3-4 REGIONS: CPT | Mod: AT | Performed by: CHIROPRACTOR

## 2019-06-03 PROCEDURE — 97810 ACUP 1/> WO ESTIM 1ST 15 MIN: CPT | Mod: GA | Performed by: CHIROPRACTOR

## 2019-06-03 NOTE — PROGRESS NOTES
Visit #:  6    Subjective:  Cherry Kim is a 58 year old female who is seen in f/u up for:        Somatic dysfunction of lumbar region  Chronic bilateral low back pain without sciatica  Somatic dysfunction of sacral region  Thoracic segment dysfunction  Pain in the coccyx.     Since last visit ,   Cherry Kim reports:    Area of chief complaint:  Lumbar :  Symptoms are graded at 4/10. The quality is described as stiff, achey, dull.  Motion has increased, but is still not normal. The pt reports 45% improvement since initial presentation. She is sitting much better when working. She notes less overall pain in the tailbone on the R side. She states the px is intermittent and can happen when she is standing for prolonged periods. She feels she is slowly improving as time progresses. She denies weakness or other unusual s.x     Since last visit the patient feels that they are less than  45 percent  improved from last visit       Objective:  The following was observed:    P: palpatory tenderness Gluteal, Lumbar erector spine and Quad lumb Bilaterally  A: static palpation demonstrates intersegmental asymmetry   R: motion palpation notes restricted motion  T: hypertonicity at: Gluteal, Lumbar erector spine and Quad lumb Bilaterally    Segmental spinal dysfunction/restrictions found at:  T6 , T9 , L5 , Sacrum  and PSIS Right       Assessment:    Diagnoses:      1. Somatic dysfunction of lumbar region    2. Chronic bilateral low back pain without sciatica    3. Somatic dysfunction of sacral region    4. Thoracic segment dysfunction    5. Pain in the coccyx        Patient's condition:  Pt responding slowly to treatment    Treatment effectiveness:  Post manipulation there is better intersegmental movement and Patient claims to feel looser post manipulation      Procedures:  CMT:  85751 Chiropractic manipulative treatment 3-4 regions performed   Thoracic: Diversified, T5, T9, Prone  Lumbar:  Diversified, L5, Side posture  Pelvis: Drop Table, Sacrum , PSIS Right , Prone    Modalities:  43275: Acupuncture, for 15 minutes:  Points: Ilda Points in lumbar and sacral spine  Ahsi point in hips and legs  For 15 minutes    Therapeutic procedures:  None     Response to Treatment  Reduction in symptoms as reported by patient    Prognosis: Good    Progress towards Goals: Patient is making progress towards the goal.  Goals:  SLEEPING: the patient specific goal is to attain his pre-injury status of 6-7 hours comfortably  SITTING: the patient specific goal is to attain pre-injury status of  8 hours comfortably         Recommendations:    Instructions:  continue to use the lumbar support     Follow-up:    Return to care in 1 week with ACP.

## 2019-06-10 ENCOUNTER — THERAPY VISIT (OUTPATIENT)
Dept: CHIROPRACTIC MEDICINE | Facility: CLINIC | Age: 59
End: 2019-06-10
Payer: COMMERCIAL

## 2019-06-10 DIAGNOSIS — M99.03 SOMATIC DYSFUNCTION OF LUMBAR REGION: Primary | ICD-10-CM

## 2019-06-10 DIAGNOSIS — G89.29 CHRONIC BILATERAL LOW BACK PAIN WITHOUT SCIATICA: ICD-10-CM

## 2019-06-10 DIAGNOSIS — M54.50 CHRONIC BILATERAL LOW BACK PAIN WITHOUT SCIATICA: ICD-10-CM

## 2019-06-10 DIAGNOSIS — M99.02 THORACIC SEGMENT DYSFUNCTION: ICD-10-CM

## 2019-06-10 DIAGNOSIS — M53.3 PAIN IN THE COCCYX: ICD-10-CM

## 2019-06-10 DIAGNOSIS — M99.04 SOMATIC DYSFUNCTION OF SACRAL REGION: ICD-10-CM

## 2019-06-10 PROCEDURE — 98941 CHIROPRACT MANJ 3-4 REGIONS: CPT | Mod: AT | Performed by: CHIROPRACTOR

## 2019-06-10 PROCEDURE — 97810 ACUP 1/> WO ESTIM 1ST 15 MIN: CPT | Mod: GA | Performed by: CHIROPRACTOR

## 2019-06-10 NOTE — PROGRESS NOTES
Visit #:  7    Subjective:  Cherry Kim is a 58 year old female who is seen in f/u up for:        Somatic dysfunction of lumbar region  Chronic bilateral low back pain without sciatica  Somatic dysfunction of sacral region  Thoracic segment dysfunction  Pain in the coccyx.     Since last visit ,   Cherry Kim reports:    Area of chief complaint:  Lumbar :  Symptoms are graded at 4/10. The quality is described as stiff, achey, dull.  Motion has increased, but is still not normal. The pt reports 45% improvement since initial presentation. She notes some good days and more sore days. She states she is able to control the pain throughout the day somewhat by the way she is sitting and engaging her posture. The pain is mostly on the R side of the coccyx and sacrum.  She denies weakness or other unusual sx.     Since last visit the patient feels that they are less than  45 percent  improved from last visit       Objective:  The following was observed:    P: palpatory tenderness Gluteal, Lumbar erector spine and Quad lumb Bilaterally  A: static palpation demonstrates intersegmental asymmetry   R: motion palpation notes restricted motion  T: hypertonicity at: Gluteal, Lumbar erector spine and Quad lumb Bilaterally    Segmental spinal dysfunction/restrictions found at:  T6 , T9 , L5 , Sacrum  and PSIS Right       Assessment:    Diagnoses:      1. Somatic dysfunction of lumbar region    2. Chronic bilateral low back pain without sciatica    3. Somatic dysfunction of sacral region    4. Thoracic segment dysfunction    5. Pain in the coccyx        Patient's condition:  Pt responding slowly to treatment    Treatment effectiveness:  Post manipulation there is better intersegmental movement and Patient claims to feel looser post manipulation      Procedures:  CMT:  03159 Chiropractic manipulative treatment 3-4 regions performed   Thoracic: Diversified, T5, T9, Prone  Lumbar: Diversified, L5, Side  posture  Pelvis: Drop Table, Sacrum , PSIS Right , Prone    Modalities:  94431: Acupuncture, for 15 minutes:  Points: Ilda Points in lumbar and sacral spine  Ahsi point in hips and legs  For 15 minutes    Therapeutic procedures:  None     Response to Treatment  Reduction in symptoms as reported by patient    Prognosis: Good    Progress towards Goals: Patient is making progress towards the goal.  Goals:  SLEEPING: the patient specific goal is to attain his pre-injury status of 6-7 hours comfortably  SITTING: the patient specific goal is to attain pre-injury status of  8 hours comfortably         Recommendations:    Instructions:  continue to use the lumbar support     Follow-up:    Return to care in 1 week with ACP.

## 2019-06-13 ENCOUNTER — TRANSFERRED RECORDS (OUTPATIENT)
Dept: HEALTH INFORMATION MANAGEMENT | Facility: CLINIC | Age: 59
End: 2019-06-13

## 2019-06-17 ENCOUNTER — THERAPY VISIT (OUTPATIENT)
Dept: CHIROPRACTIC MEDICINE | Facility: CLINIC | Age: 59
End: 2019-06-17
Payer: COMMERCIAL

## 2019-06-17 DIAGNOSIS — M54.50 CHRONIC BILATERAL LOW BACK PAIN WITHOUT SCIATICA: ICD-10-CM

## 2019-06-17 DIAGNOSIS — G89.29 CHRONIC BILATERAL LOW BACK PAIN WITHOUT SCIATICA: ICD-10-CM

## 2019-06-17 DIAGNOSIS — M99.04 SOMATIC DYSFUNCTION OF SACRAL REGION: ICD-10-CM

## 2019-06-17 DIAGNOSIS — M99.03 SOMATIC DYSFUNCTION OF LUMBAR REGION: Primary | ICD-10-CM

## 2019-06-17 DIAGNOSIS — M99.02 THORACIC SEGMENT DYSFUNCTION: ICD-10-CM

## 2019-06-17 DIAGNOSIS — M53.3 PAIN IN THE COCCYX: ICD-10-CM

## 2019-06-17 PROCEDURE — 98941 CHIROPRACT MANJ 3-4 REGIONS: CPT | Mod: AT | Performed by: CHIROPRACTOR

## 2019-06-17 PROCEDURE — 97810 ACUP 1/> WO ESTIM 1ST 15 MIN: CPT | Mod: GA | Performed by: CHIROPRACTOR

## 2019-06-17 NOTE — PROGRESS NOTES
Visit #:  8    Subjective:  Cherry Kim is a 58 year old female who is seen in f/u up for:        Somatic dysfunction of lumbar region  Chronic bilateral low back pain without sciatica  Somatic dysfunction of sacral region  Pain in the coccyx  Thoracic segment dysfunction.     Since last visit ,   Cherry Kim reports:    Area of chief complaint:  Lumbar :  Symptoms are graded at 4/10. The quality is described as stiff, achey, dull.  Motion has increased, but is still not normal. The pt reports 50% improvement since initial presentation. She notes some good days and some sore days. She states the soreness she will feel in the coccyx may relate to how much she strained while going to the bathroom. The pain in the coccyx seems unrelated to the amount of time she will sit -now that she corrected her posture. She notes mild soreness in the tailbone today. The pt denies other unusual sx.      Since last visit the patient feels that they are less than  50 percent  improved from last visit       Objective:  The following was observed:    P: palpatory tenderness Gluteal, Lumbar erector spine and Quad lumb Bilaterally  A: static palpation demonstrates intersegmental asymmetry   R: motion palpation notes restricted motion  T: hypertonicity at: Gluteal, Lumbar erector spine and Quad lumb Bilaterally    Segmental spinal dysfunction/restrictions found at:  T6 , T9 , L5 , Sacrum  and PSIS Right       Assessment:    Diagnoses:      1. Somatic dysfunction of lumbar region    2. Chronic bilateral low back pain without sciatica    3. Somatic dysfunction of sacral region    4. Pain in the coccyx    5. Thoracic segment dysfunction        Patient's condition:  Slight improvement    Treatment effectiveness:  Post manipulation there is better intersegmental movement and Patient claims to feel looser post manipulation      Procedures:  CMT:  88536 Chiropractic manipulative treatment 3-4 regions performed    Thoracic: Diversified, T5, T9, Prone  Lumbar: Diversified, L5, Side posture  Pelvis: Drop Table, Sacrum , PSIS Right , Prone    Modalities:  58997: Acupuncture, for 15 minutes:  Points: Ilda Points in lumbar and sacral spine  Ahsi point in hips and legs  For 15 minutes    Therapeutic procedures:  None     Response to Treatment  Reduction in symptoms as reported by patient    Prognosis: Good    Progress towards Goals: Patient is making progress towards the goal.  Goals:  SLEEPING: the patient specific goal is to attain his pre-injury status of 6-7 hours comfortably  SITTING: the patient specific goal is to attain pre-injury status of  8 hours comfortably         Recommendations:    Instructions:  continue to use the lumbar support     Follow-up:    Return to care in 1 week with ACP.

## 2019-06-24 ENCOUNTER — THERAPY VISIT (OUTPATIENT)
Dept: CHIROPRACTIC MEDICINE | Facility: CLINIC | Age: 59
End: 2019-06-24
Payer: COMMERCIAL

## 2019-06-24 DIAGNOSIS — M99.03 SOMATIC DYSFUNCTION OF LUMBAR REGION: Primary | ICD-10-CM

## 2019-06-24 DIAGNOSIS — G89.29 CHRONIC BILATERAL LOW BACK PAIN WITHOUT SCIATICA: ICD-10-CM

## 2019-06-24 DIAGNOSIS — M54.50 CHRONIC BILATERAL LOW BACK PAIN WITHOUT SCIATICA: ICD-10-CM

## 2019-06-24 DIAGNOSIS — M53.3 PAIN IN THE COCCYX: ICD-10-CM

## 2019-06-24 DIAGNOSIS — M99.02 THORACIC SEGMENT DYSFUNCTION: ICD-10-CM

## 2019-06-24 DIAGNOSIS — M99.04 SOMATIC DYSFUNCTION OF SACRAL REGION: ICD-10-CM

## 2019-06-24 PROCEDURE — 97810 ACUP 1/> WO ESTIM 1ST 15 MIN: CPT | Mod: GA | Performed by: CHIROPRACTOR

## 2019-06-24 PROCEDURE — 98941 CHIROPRACT MANJ 3-4 REGIONS: CPT | Mod: AT | Performed by: CHIROPRACTOR

## 2019-07-01 ENCOUNTER — THERAPY VISIT (OUTPATIENT)
Dept: CHIROPRACTIC MEDICINE | Facility: CLINIC | Age: 59
End: 2019-07-01
Payer: COMMERCIAL

## 2019-07-01 DIAGNOSIS — M99.03 SOMATIC DYSFUNCTION OF LUMBAR REGION: Primary | ICD-10-CM

## 2019-07-01 DIAGNOSIS — M54.50 CHRONIC BILATERAL LOW BACK PAIN WITHOUT SCIATICA: ICD-10-CM

## 2019-07-01 DIAGNOSIS — M53.3 PAIN IN THE COCCYX: ICD-10-CM

## 2019-07-01 DIAGNOSIS — M99.02 THORACIC SEGMENT DYSFUNCTION: ICD-10-CM

## 2019-07-01 DIAGNOSIS — G89.29 CHRONIC BILATERAL LOW BACK PAIN WITHOUT SCIATICA: ICD-10-CM

## 2019-07-01 DIAGNOSIS — M99.04 SOMATIC DYSFUNCTION OF SACRAL REGION: ICD-10-CM

## 2019-07-01 PROCEDURE — 98941 CHIROPRACT MANJ 3-4 REGIONS: CPT | Mod: AT | Performed by: CHIROPRACTOR

## 2019-07-01 PROCEDURE — 97810 ACUP 1/> WO ESTIM 1ST 15 MIN: CPT | Mod: GA | Performed by: CHIROPRACTOR

## 2019-07-01 NOTE — PROGRESS NOTES
Visit #:  10    Subjective:  Cherry Kim is a 58 year old female who is seen in f/u up for:        Somatic dysfunction of lumbar region  Chronic bilateral low back pain without sciatica  Somatic dysfunction of sacral region  Pain in the coccyx  Thoracic segment dysfunction.     Since last visit ,   Cherry Kim reports:    Area of chief complaint:  Lumbar :  Symptoms are graded at 4/10. The quality is described as stiff, achey, dull.  Motion has increased, but is still not normal. The pt reports 60% improvement since initial presentation. She continues to report more good days. She was performing increased activities over the past week without exacerbation. When she has a bowel movement in the mornings she will feel pain in the coccyx however she states yesterday she did not experience pain. The pt denies weakness or other unusual sx.     Since last visit the patient feels that they are less than  60 percent  improved from last visit-slowly improving     Objective:  The following was observed:    P: palpatory tenderness Gluteal, Lumbar erector spine and Quad lumb Bilaterally  A: static palpation demonstrates intersegmental asymmetry   R: motion palpation notes restricted motion  T: hypertonicity at: Gluteal, Lumbar erector spine and Quad lumb Bilaterally    Segmental spinal dysfunction/restrictions found at:  T6 , T9 , L5 , Sacrum  and PSIS Right       Assessment:    Diagnoses:      1. Somatic dysfunction of lumbar region    2. Chronic bilateral low back pain without sciatica    3. Somatic dysfunction of sacral region    4. Pain in the coccyx    5. Thoracic segment dysfunction        Patient's condition:  No change     Treatment effectiveness:  Post manipulation there is better intersegmental movement and Patient claims to feel looser post manipulation      Procedures:  CMT:  00909 Chiropractic manipulative treatment 3-4 regions performed   Thoracic: Diversified, T5, T9, Prone  Lumbar:  Diversified, L5, Side posture  Pelvis: Drop Table, Sacrum , PSIS Right , Prone    Modalities:  36042: Acupuncture, for 15 minutes:  Points: Ilda Points in lumbar and sacral spine  Ahsi point in hips and legs  For 15 minutes    Therapeutic procedures:  None     Response to Treatment  Reduction in symptoms as reported by patient    Prognosis: Good    Progress towards Goals: Patient is making progress towards the goal.  Goals:  SLEEPING: the patient specific goal is to attain his pre-injury status of 6-7 hours comfortably  SITTING: the patient specific goal is to attain pre-injury status of  8 hours comfortably         Recommendations:    Instructions:  continue to use the lumbar support     Follow-up:    Return to care in 1 week with ACP.

## 2019-07-08 ENCOUNTER — THERAPY VISIT (OUTPATIENT)
Dept: CHIROPRACTIC MEDICINE | Facility: CLINIC | Age: 59
End: 2019-07-08
Payer: COMMERCIAL

## 2019-07-08 DIAGNOSIS — G89.29 CHRONIC BILATERAL LOW BACK PAIN WITHOUT SCIATICA: ICD-10-CM

## 2019-07-08 DIAGNOSIS — M99.03 SOMATIC DYSFUNCTION OF LUMBAR REGION: Primary | ICD-10-CM

## 2019-07-08 DIAGNOSIS — M54.50 CHRONIC BILATERAL LOW BACK PAIN WITHOUT SCIATICA: ICD-10-CM

## 2019-07-08 DIAGNOSIS — M99.02 THORACIC SEGMENT DYSFUNCTION: ICD-10-CM

## 2019-07-08 DIAGNOSIS — M99.04 SOMATIC DYSFUNCTION OF SACRAL REGION: ICD-10-CM

## 2019-07-08 DIAGNOSIS — M53.3 PAIN IN THE COCCYX: ICD-10-CM

## 2019-07-08 PROCEDURE — 98941 CHIROPRACT MANJ 3-4 REGIONS: CPT | Mod: AT | Performed by: CHIROPRACTOR

## 2019-07-08 PROCEDURE — 97810 ACUP 1/> WO ESTIM 1ST 15 MIN: CPT | Mod: GA | Performed by: CHIROPRACTOR

## 2019-07-08 NOTE — PROGRESS NOTES
Visit #:  11    Subjective:  Cherry Kim is a 58 year old female who is seen in f/u up for:        Somatic dysfunction of lumbar region  Chronic bilateral low back pain without sciatica  Somatic dysfunction of sacral region  Pain in the coccyx  Thoracic segment dysfunction.     Since last visit ,   Cherry Kim reports:    Area of chief complaint:  Lumbar :  Symptoms are graded at 4/10. The quality is described as stiff, achey, dull.  Motion has increased, but is still not normal. The pt reports 60% improvement since initial presentation. She  has not changed since the previous treatment. She notes tenderness in the sacral and coccyx area in the morning and with stress. She denies radiation of pain in the extremities. The pt denies weakness in the extremities or other unusual sx.     Since last visit the patient feels that they are less than  60 percent  improved from last visit-no change      Objective:  The following was observed:    P: palpatory tenderness Gluteal, Lumbar erector spine and Quad lumb Bilaterally  A: static palpation demonstrates intersegmental asymmetry   R: motion palpation notes restricted motion  T: hypertonicity at: Gluteal, Lumbar erector spine and Quad lumb Bilaterally    Segmental spinal dysfunction/restrictions found at:  T6 , T9 , L5 , Sacrum  and PSIS Right       Assessment:    Diagnoses:      1. Somatic dysfunction of lumbar region    2. Chronic bilateral low back pain without sciatica    3. Somatic dysfunction of sacral region    4. Pain in the coccyx    5. Thoracic segment dysfunction        Patient's condition:  No change     Treatment effectiveness:  Post manipulation there is better intersegmental movement and Patient claims to feel looser post manipulation      Procedures:  CMT:  61949 Chiropractic manipulative treatment 3-4 regions performed   Thoracic: Diversified, T5, T9, Prone  Lumbar: Diversified, L5, Side posture  Pelvis: Drop Table, Sacrum , PSIS  Right , Prone    Modalities:  55116: Acupuncture, for 15 minutes:  Points: Ilda Points in lumbar and sacral spine  Ahsi point in hips and legs  For 15 minutes    Therapeutic procedures:  None     Response to Treatment  Reduction in symptoms as reported by patient    Prognosis: Good    Progress towards Goals: Patient is making progress towards the goal.  Goals:  SLEEPING: the patient specific goal is to attain his pre-injury status of 6-7 hours comfortably  SITTING: the patient specific goal is to attain pre-injury status of  8 hours comfortably         Recommendations:    Instructions:  continue to use the lumbar support     Follow-up:    Return to care in 1 week with ACP.

## 2019-07-22 ENCOUNTER — THERAPY VISIT (OUTPATIENT)
Dept: CHIROPRACTIC MEDICINE | Facility: CLINIC | Age: 59
End: 2019-07-22
Payer: COMMERCIAL

## 2019-07-22 DIAGNOSIS — M53.3 PAIN IN THE COCCYX: ICD-10-CM

## 2019-07-22 DIAGNOSIS — M99.02 THORACIC SEGMENT DYSFUNCTION: ICD-10-CM

## 2019-07-22 DIAGNOSIS — G89.29 CHRONIC BILATERAL LOW BACK PAIN WITHOUT SCIATICA: ICD-10-CM

## 2019-07-22 DIAGNOSIS — M99.04 SOMATIC DYSFUNCTION OF SACRAL REGION: ICD-10-CM

## 2019-07-22 DIAGNOSIS — M54.50 CHRONIC BILATERAL LOW BACK PAIN WITHOUT SCIATICA: ICD-10-CM

## 2019-07-22 DIAGNOSIS — M99.03 SOMATIC DYSFUNCTION OF LUMBAR REGION: Primary | ICD-10-CM

## 2019-07-22 PROCEDURE — 98941 CHIROPRACT MANJ 3-4 REGIONS: CPT | Mod: AT | Performed by: CHIROPRACTOR

## 2019-07-22 PROCEDURE — 97810 ACUP 1/> WO ESTIM 1ST 15 MIN: CPT | Mod: GA | Performed by: CHIROPRACTOR

## 2019-07-24 DIAGNOSIS — E03.4 HYPOTHYROIDISM DUE TO ACQUIRED ATROPHY OF THYROID: ICD-10-CM

## 2019-07-24 DIAGNOSIS — E78.5 HYPERLIPIDEMIA LDL GOAL <130: ICD-10-CM

## 2019-07-24 DIAGNOSIS — I10 ESSENTIAL HYPERTENSION, BENIGN: ICD-10-CM

## 2019-07-24 RX ORDER — LEVOTHYROXINE SODIUM 137 UG/1
137 TABLET ORAL DAILY
Qty: 30 TABLET | Refills: 0 | Status: SHIPPED | OUTPATIENT
Start: 2019-07-24 | End: 2019-08-20

## 2019-07-24 RX ORDER — METOPROLOL TARTRATE 50 MG
TABLET ORAL
Qty: 60 TABLET | Refills: 0 | Status: SHIPPED | OUTPATIENT
Start: 2019-07-24 | End: 2019-08-20

## 2019-07-24 RX ORDER — SPIRONOLACTONE AND HYDROCHLOROTHIAZIDE 25; 25 MG/1; MG/1
TABLET ORAL
Qty: 30 TABLET | Refills: 0 | Status: SHIPPED | OUTPATIENT
Start: 2019-07-24 | End: 2019-08-20

## 2019-07-24 RX ORDER — SIMVASTATIN 40 MG
TABLET ORAL
Qty: 30 TABLET | Refills: 0 | Status: SHIPPED | OUTPATIENT
Start: 2019-07-24 | End: 2019-08-20

## 2019-07-24 NOTE — TELEPHONE ENCOUNTER
"Requested Prescriptions   Pending Prescriptions Disp Refills     simvastatin (ZOCOR) 40 MG tablet [Pharmacy Med Name: SIMVASTATIN 40 MG TABLET] 90 tablet 0     Sig: TAKE 1 TABLET BY MOUTH EVERYDAY AT BEDTIME       Statins Protocol Failed - 7/24/2019  1:32 PM        Failed - LDL on file in past 12 months     Recent Labs   Lab Test 05/09/18  0911   LDL 98             Failed - Recent (12 mo) or future (30 days) visit within the authorizing provider's specialty     Patient had office visit in the last 12 months or has a visit in the next 30 days with authorizing provider or within the authorizing provider's specialty.  See \"Patient Info\" tab in inbasket, or \"Choose Columns\" in Meds & Orders section of the refill encounter.              Passed - No abnormal creatine kinase in past 12 months     No lab results found.             Passed - Medication is active on med list        Passed - Patient is age 18 or older        Passed - No active pregnancy on record        Passed - No positive pregnancy test in past 12 months        spironolactone-HCTZ (ALDACTAZIDE) 25-25 MG tablet [Pharmacy Med Name: SPIRONOLACTONE-HCTZ 25-25 TAB] 90 tablet 0     Sig: TAKE ONE TABLET BY MOUTH ONCE DAILY       Diuretics (Including Combos) Protocol Failed - 7/24/2019  1:32 PM        Failed - Recent (12 mo) or future (30 days) visit within the authorizing provider's specialty     Patient had office visit in the last 12 months or has a visit in the next 30 days with authorizing provider or within the authorizing provider's specialty.  See \"Patient Info\" tab in in[place], or \"Choose Columns\" in Meds & Orders section of the refill encounter.              Failed - Normal serum creatinine on file in past 12 months     Recent Labs   Lab Test 07/19/18  1645   CR 0.74              Failed - Normal serum potassium on file in past 12 months     Recent Labs   Lab Test 07/19/18  1645   POTASSIUM 3.6                    Failed - Normal serum sodium on file in past " "12 months     Recent Labs   Lab Test 07/19/18  1645                 Passed - Blood pressure under 140/90 in past 12 months     BP Readings from Last 3 Encounters:   07/31/18 128/80   05/15/18 124/84   05/09/18 140/84                 Passed - Medication is active on med list        Passed - Patient is age 18 or older        Passed - No active pregancy on record        Passed - No positive pregnancy test in past 12 months        metoprolol tartrate (LOPRESSOR) 50 MG tablet [Pharmacy Med Name: METOPROLOL TARTRATE 50 MG TAB] 180 tablet 0     Sig: TAKE 1 TABLET (50 MG) BY MOUTH 2 TIMES DAILY       Beta-Blockers Protocol Failed - 7/24/2019  1:32 PM        Failed - Recent (12 mo) or future (30 days) visit within the authorizing provider's specialty     Patient had office visit in the last 12 months or has a visit in the next 30 days with authorizing provider or within the authorizing provider's specialty.  See \"Patient Info\" tab in inbasket, or \"Choose Columns\" in Meds & Orders section of the refill encounter.              Passed - Blood pressure under 140/90 in past 12 months     BP Readings from Last 3 Encounters:   07/31/18 128/80   05/15/18 124/84   05/09/18 140/84                 Passed - Patient is age 6 or older        Passed - Medication is active on med list        Medication is being filled for 1 time refill only due to:  Patient needs to be seen because it has been more than one year since last visit.    "

## 2019-07-24 NOTE — TELEPHONE ENCOUNTER
"Requested Prescriptions   Pending Prescriptions Disp Refills     levothyroxine (SYNTHROID/LEVOTHROID) 137 MCG tablet [Pharmacy Med Name: LEVOTHYROXINE 137 MCG TABLET] 90 tablet 0     Sig: TAKE 1 TABLET (137 MCG) BY MOUTH DAILY       Thyroid Protocol Failed - 7/24/2019 12:33 PM        Failed - Recent (12 mo) or future (30 days) visit within the authorizing provider's specialty     Patient had office visit in the last 12 months or has a visit in the next 30 days with authorizing provider or within the authorizing provider's specialty.  See \"Patient Info\" tab in inbasket, or \"Choose Columns\" in Meds & Orders section of the refill encounter.              Failed - Normal TSH on file in past 12 months     Recent Labs   Lab Test 05/09/18  0911   TSH 0.69              Passed - Patient is 12 years or older        Passed - Medication is active on med list        Passed - No active pregnancy on record     If patient is pregnant or has had a positive pregnancy test, please check TSH.          Passed - No positive pregnancy test in past 12 months     If patient is pregnant or has had a positive pregnancy test, please check TSH.          Medication is being filled for 1 time refill only due to:  Patient needs to be seen because it has been more than one year since last visit.    "

## 2019-07-24 NOTE — LETTER
[facility]  [address] [id number] [phone]            Cherry Costello [place]  [address] [place] [id number]        July 24, 2019    Dear Charu,    While refilling your prescription today, we noticed that you are due for an appointment with your provider.  We will refill your prescription for 30 days, but a follow-up appointment must be made before any additional refills can be approved.     Taking care of your health is important to us and we look forward to seeing you in the near future.  Please call us at [phone] or [phone] (or use [employer]) to schedule an appointment.     Please disregard this notice if you have already made an appointment.    Sincerely,        [facility]

## 2019-08-01 ENCOUNTER — TRANSFERRED RECORDS (OUTPATIENT)
Dept: HEALTH INFORMATION MANAGEMENT | Facility: CLINIC | Age: 59
End: 2019-08-01

## 2019-08-05 ENCOUNTER — THERAPY VISIT (OUTPATIENT)
Dept: CHIROPRACTIC MEDICINE | Facility: CLINIC | Age: 59
End: 2019-08-05
Payer: COMMERCIAL

## 2019-08-05 DIAGNOSIS — M99.03 SOMATIC DYSFUNCTION OF LUMBAR REGION: Primary | ICD-10-CM

## 2019-08-05 DIAGNOSIS — M53.3 PAIN IN THE COCCYX: ICD-10-CM

## 2019-08-05 DIAGNOSIS — M54.50 CHRONIC BILATERAL LOW BACK PAIN WITHOUT SCIATICA: ICD-10-CM

## 2019-08-05 DIAGNOSIS — M99.04 SOMATIC DYSFUNCTION OF SACRAL REGION: ICD-10-CM

## 2019-08-05 DIAGNOSIS — M99.02 THORACIC SEGMENT DYSFUNCTION: ICD-10-CM

## 2019-08-05 DIAGNOSIS — G89.29 CHRONIC BILATERAL LOW BACK PAIN WITHOUT SCIATICA: ICD-10-CM

## 2019-08-05 PROCEDURE — 97810 ACUP 1/> WO ESTIM 1ST 15 MIN: CPT | Mod: GA | Performed by: CHIROPRACTOR

## 2019-08-05 PROCEDURE — 98941 CHIROPRACT MANJ 3-4 REGIONS: CPT | Mod: AT | Performed by: CHIROPRACTOR

## 2019-08-05 NOTE — PROGRESS NOTES
Visit #:  12    Subjective:  Cherry Kim is a 58 year old female who is seen in f/u up for:        Somatic dysfunction of lumbar region  Chronic bilateral low back pain without sciatica  Somatic dysfunction of sacral region  Pain in the coccyx  Thoracic segment dysfunction.     Since last visit ,   Cherry Kim reports:    Area of chief complaint:  Lumbar :  Symptoms are graded at 4/10. The quality is described as stiff, achey, dull.  Motion has increased, but is still not normal. The pt reports 55-60% improvement since initial presentation with overall sx. . She states she is doing well and is much more stable. The pt states she is recovering faster after an episode of pain. She notes decreased bowel movement pain in the coccyx in addition to less episodes of bowel movements. The pt is pleased with her progress.        Since last visit the patient feels that they are overall  55-60  percent  improved from last visit-stable     Objective:  The following was observed:    P: palpatory tenderness Gluteal, Lumbar erector spine and Quad lumb Bilaterally  A: static palpation demonstrates intersegmental asymmetry   R: motion palpation notes restricted motion  T: hypertonicity at: Gluteal, Lumbar erector spine and Quad lumb Bilaterally    Segmental spinal dysfunction/restrictions found at:  T6 , T9 , L5 , Sacrum  and PSIS Right       Assessment:    Diagnoses:      1. Somatic dysfunction of lumbar region    2. Chronic bilateral low back pain without sciatica    3. Somatic dysfunction of sacral region    4. Pain in the coccyx    5. Thoracic segment dysfunction        Patient's condition:  Pt responding slowly to treatment     Treatment effectiveness:  Post manipulation there is better intersegmental movement and Patient claims to feel looser post manipulation      Procedures:  CMT:  20305 Chiropractic manipulative treatment 3-4 regions performed   Thoracic: Diversified, T5, T9, Prone  Lumbar:  Diversified, L5, Side posture  Pelvis: Drop Table, Sacrum , PSIS Right , Prone    Modalities:  97916: Acupuncture, for 15 minutes:  Points: Huatuojoaji Points in lumbar and sacral spine  Ahsi point in hips and legs  Points in the coccyx.     For 15 minutes    Therapeutic procedures:  None     Response to Treatment  Reduction in symptoms as reported by patient    Prognosis: Good    Progress towards Goals: Patient is making progress towards the goal.  Goals:  SLEEPING: the patient specific goal is to attain his pre-injury status of 6-7 hours comfortably  SITTING: the patient specific goal is to attain pre-injury status of  8 hours comfortably         Recommendations:    Instructions:  continue to use the lumbar support     Follow-up:    Return to care in 2 week with ACP.

## 2019-08-07 DIAGNOSIS — B00.9 HSV INFECTION: ICD-10-CM

## 2019-08-07 RX ORDER — VALACYCLOVIR HYDROCHLORIDE 500 MG/1
TABLET, FILM COATED ORAL
Qty: 30 TABLET | Refills: 3 | Status: SHIPPED | OUTPATIENT
Start: 2019-08-07 | End: 2019-09-03

## 2019-08-07 NOTE — TELEPHONE ENCOUNTER
"Requested Prescriptions   Pending Prescriptions Disp Refills     valACYclovir (VALTREX) 500 MG tablet [Pharmacy Med Name: VALACYCLOVIR  MG TABLET] 90 tablet 3     Sig: TAKE 1 TABLET BY MOUTH EVERY DAY       Antivirals for Herpes Protocol Failed - 8/7/2019  1:09 AM        Failed - Recent (12 mo) or future (30 days) visit within the authorizing provider's specialty     Patient had office visit in the last 12 months or has a visit in the next 30 days with authorizing provider or within the authorizing provider's specialty.  See \"Patient Info\" tab in inbasket, or \"Choose Columns\" in Meds & Orders section of the refill encounter.              Failed - Normal serum creatinine on file in past 12 months     Recent Labs   Lab Test 07/19/18  1645   CR 0.74             Passed - Patient is age 12 or older        Passed - Medication is active on med list      Last Written Prescription Date:  7/31/18  Last Fill Quantity: 90,  # refills: 3   Last office visit: 7/31/2018 with prescribing provider:  [facility]   Future Office Visit:   Next 5 appointments (look out 90 days)    Aug 27, 2019  7:40 AM CDT  Lesa Meza with Krish Shannon MD  [facility] [address] [address] [phone]       Medication is being filled for 1 time refill only due to:  Patient needs to be seen because it has been more than one year since last visit.  "

## 2019-08-19 ENCOUNTER — THERAPY VISIT (OUTPATIENT)
Dept: CHIROPRACTIC MEDICINE | Facility: CLINIC | Age: 59
End: 2019-08-19
Payer: COMMERCIAL

## 2019-08-19 DIAGNOSIS — M99.04 SOMATIC DYSFUNCTION OF SACRAL REGION: ICD-10-CM

## 2019-08-19 DIAGNOSIS — M99.03 SOMATIC DYSFUNCTION OF LUMBAR REGION: Primary | ICD-10-CM

## 2019-08-19 DIAGNOSIS — M99.02 THORACIC SEGMENT DYSFUNCTION: ICD-10-CM

## 2019-08-19 DIAGNOSIS — M54.50 CHRONIC BILATERAL LOW BACK PAIN WITHOUT SCIATICA: ICD-10-CM

## 2019-08-19 DIAGNOSIS — M53.3 PAIN IN THE COCCYX: ICD-10-CM

## 2019-08-19 DIAGNOSIS — G89.29 CHRONIC BILATERAL LOW BACK PAIN WITHOUT SCIATICA: ICD-10-CM

## 2019-08-19 PROCEDURE — 98941 CHIROPRACT MANJ 3-4 REGIONS: CPT | Mod: AT | Performed by: CHIROPRACTOR

## 2019-08-19 PROCEDURE — 97810 ACUP 1/> WO ESTIM 1ST 15 MIN: CPT | Mod: GA | Performed by: CHIROPRACTOR

## 2019-08-20 NOTE — PROGRESS NOTES
Cherry is a 58 year old  female who presents for annual exam.     Besides routine health maintenance,  she would like to discuss vaginal pain.    HPI:  The patient's PCP is Krish Shannon MD.    History of rectal fissure yrs ago  Needs valtrex refill for her genital hsv  Has worked with physical therapist for chronic low back and pelvic floor pain  Uses replens  Having vaginal dryness        GYNECOLOGIC HISTORY:    Patient's last menstrual period was 2007.  Her current contraception method is: menopause.  She  reports that she has never smoked. She has never used smokeless tobacco.    Patient is sexually active.  STD testing offered?  Declined  Last PHQ-9 score on record =   PHQ-9 SCORE 9/3/2019   PHQ-9 Total Score 0     Last GAD7 score on record =   GAIL-7 SCORE 9/3/2019   Total Score 0     Alcohol Score = 3    HEALTH MAINTENANCE:  Cholesterol:    Lab Results   Component Value Date    CHOL 157 2018     Lab Results   Component Value Date    HDL 48 2018     Lab Results   Component Value Date    LDL 98 2018     Lab Results   Component Value Date    TRIG 57 2018     Lab Results   Component Value Date    CHOLHDLRATIO 3.0 2014       Last Mammo: 18, Result: normal, Next Mammo: Needs to schedule for sometime after   Pap:   Lab Results   Component Value Date    PAP NIL, HPV- 2018    PAP NIL 01/15/2015    PAP NIL 2011      Colonoscopy:  19 through [facility], Result: normal, Next Colonoscopy: 10 years.  Dexa:  18 wnl    Health maintenance updated:  yes    HISTORY:  OB History    Para Term  AB Living   0 0 0 0 0 0   SAB TAB Ectopic Multiple Live Births   0 0 0 0 0       Patient Active Problem List   Diagnosis     Hypothyroidism     Proteinuria     Essential hypertension, benign     Family history of malignant neoplasm of breast     Crustacean allergy     Obesity     Other specified disorder of rectum and anus     Hemorrhoids      Health Care Home     Connective tissue disorder (H)     Impaired fasting glucose     Hyperlipidemia LDL goal <130     Hallux abducto valgus     Advanced directives, counseling/discussion     Pain in the coccyx     Somatic dysfunction of lumbar region     Lumbago     Somatic dysfunction of sacral region     Thoracic segment dysfunction     Past Surgical History:   Procedure Laterality Date     C NONSPECIFIC PROCEDURE      Tosillectomy     C NONSPECIFIC PROCEDURE      rectal abcess     CRYOTHERAPY, CERVICAL      1980s     HC RECONSTR NOSE+BULL SEPTAL REPAIR             Social History     Tobacco Use     Smoking status: Never Smoker     Smokeless tobacco: Never Used   Substance Use Topics     Alcohol use: Yes     Comment: 2-3 times per week      Problem (# of Occurrences) Relation (Name,Age of Onset)    Breast Cancer (1) Mother: At 55 yrs    C.A.D. (1) Mother:  of CHF,  at 58yrs of ? MI    Cancer (6) Father: Lung Cancer, Brother: brain cancer  , Maternal Aunt: vaginal, Maternal Aunt: cervical, Maternal Aunt: Rectal cancer, Maternal Grandfather: Stomach cancer in his 40s    Connective Tissue Disorder (1) Mother: Lupus    Diabetes (1) Mother: Mid forties    Hypertension (1) Mother    No Known Problems (6) Sister, Brother, Brother, Maternal Grandmother, Paternal Grandmother, Other    Respiratory (2) Father: d. 79 from complications of pneumonia, Brother: COPD- Lung transplant               Current Outpatient Medications   Medication Sig     CO Q-10 100 MG OR CAPS 1 daily     HYDROXYCHLOROQUINE SULFATE 200 MG PO TABS 1 TABLET DAILY     levothyroxine (SYNTHROID/LEVOTHROID) 137 MCG tablet TAKE 1 TABLET (137 MCG) BY MOUTH DAILY     metoprolol tartrate (LOPRESSOR) 50 MG tablet TAKE 1 TABLET (50 MG) BY MOUTH 2 TIMES DAILY     simvastatin (ZOCOR) 40 MG tablet TAKE 1 TABLET BY MOUTH EVERYDAY AT BEDTIME     spironolactone-HCTZ (ALDACTAZIDE) 25-25 MG tablet TAKE 1 TABLET BY MOUTH EVERY DAY     Vaginal  "Lubricant (REPLENS) GEL Place vaginally as needed     valACYclovir (VALTREX) 500 MG tablet TAKE 1 TABLET BY MOUTH EVERY DAY     EPINEPHrine 0.3 MG/0.3ML injection Inject 0.3 mLs (0.3 mg) into the muscle as needed (Patient not taking: Reported on 9/3/2019)     Iodoquinol-HC (HYDROCORTISONE-IODOQUINOL) 1-1 % CREA Externally apply  topically daily as needed.     Lidocaine 2 % GEL Place 1 Dose rectally every 4 hours as needed (Patient not taking: Reported on 9/3/2019)     naproxen (NAPROSYN) 500 MG tablet TK 1 OR 2 TS PO QD PRN     No current facility-administered medications for this visit.      Allergies   Allergen Reactions     Lisinopril Anaphylaxis     Ciprofloxacin Diarrhea     Codeine      severe nausea     Codeine Nausea     Lisinopril Anaphylaxis     angioedema     Shellfish Allergy Nausea and Vomiting     Hives on neck     Sulfa Drugs      hives  Other reaction(s): Hives       Past medical, surgical, social and family histories were reviewed and updated in EPIC.    ROS:   12 point review of systems negative other than symptoms noted below.  Genitourinary: Vaginal Dryness and Vaginal pain    EXAM:  /66   Pulse 70   Ht 1.651 m (5' 5\")   Wt 94.5 kg (208 lb 6.4 oz)   LMP 06/27/2007   BMI 34.68 kg/m     BMI: Body mass index is 34.68 kg/m .    PHYSICAL EXAM:  Constitutional:  Appearance: Well nourished, well developed, alert, in no acute distress  Neck:  Lymph Nodes:  No lymphadenopathy present    Thyroid:  Gland size normal, nontender, no nodules or masses present  on palpation  Chest:  Respiratory Effort:  Breathing unlabored  Cardiovascular:    Heart: Auscultation:  Regular rate, normal rhythm, no murmurs present  Breasts: Inspection of Breasts:  No lymphadenopathy present., Palpation of Breasts and Axillae:  No masses present on palpation, no breast tenderness., Axillary Lymph Nodes:  No lymphadenopathy present. and No nodularity, asymmetry or nipple discharge " bilaterally.  Gastrointestinal:   Abdominal Examination:  Abdomen nontender to palpation, tone normal without rigidity or guarding, no masses present, umbilicus without lesions   Liver and Spleen:  No hepatomegaly present, liver nontender to palpation    Hernias:  No hernias present  Lymphatic: Lymph Nodes:  No other lymphadenopathy present  Skin:  General Inspection:  No rashes present, no lesions present, no areas of  discoloration    Genitalia and Groin:  No rashes present, no lesions present, no areas of  discoloration, no masses present  Neurologic/Psychiatric:    Mental Status:  Oriented X3     Pelvic Exam:  External Genitalia:     Normal appearance for age, no discharge present, no tenderness present, no inflammatory lesions present, color normal  Vagina:     Normal vaginal vault without central or paravaginal defects, ATROPHIC  Bladder:     Nontender to palpation  Urethra:   Urethral Body:  Urethra palpation normal, urethra structural support normal   Urethral Meatus:  No erythema or lesions present  Cervix:     Appearance healthy, no lesions present, nontender to palpation, no bleeding present  Uterus:     Nontender to palpation, no masses present, position anteflexed, mobility: normal  Adnexa:     No adnexal tenderness present, no adnexal masses present  Perineum:     Perineum within normal limits, no evidence of trauma, no rashes or skin lesions present  Inguinal Lymph Nodes:     No lymphadenopathy present      COUNSELING:   Reviewed preventive health counseling, as reflected in patient instructions       (Dede)menopause management    BMI: Body mass index is 34.68 kg/m .  Weight management plan: Discussed healthy diet and exercise guidelines    ASSESSMENT:  58 year old female with satisfactory annual exam.    ICD-10-CM    1. Encounter for gynecological examination without abnormal finding Z01.419        PLAN:  Had colonoscopy Aug 1  Pap and hpv normal last years  Schedule mammogram  New prescription for  yuvafem 2x/wk for dryness  Refill valtrex  Discussed vaginal dryness      Romina Lopes MD

## 2019-08-26 NOTE — PROGRESS NOTES
Visit #:  13    Subjective:  Cherry Kim is a 58 year old female who is seen in f/u up for:        Somatic dysfunction of lumbar region  Chronic bilateral low back pain without sciatica  Somatic dysfunction of sacral region  Pain in the coccyx  Thoracic segment dysfunction.     Since last visit ,   Cherry Kim reports:    Area of chief complaint:  Lumbar :  Symptoms are graded at 4/10. The quality is described as stiff, achey, dull.  Motion has increased, but is still not normal. The pt reports 65% improvement since initial presentation with overall sx. She reports less sx in the coccyx area and pelvic area when sitting. In the past week she was travelling and sitting at a wedding for many hours. Pain seemed to increase gradually in the area. The pt denies weakness in the extremities or other unusual sx.      Since last visit the patient feels that they are overall  65 percent  improved from last visit-slight exacerbation due to sitting   Objective:  The following was observed:    P: palpatory tenderness Gluteal, Lumbar erector spine and Quad lumb Bilaterally  A: static palpation demonstrates intersegmental asymmetry   R: motion palpation notes restricted motion  T: hypertonicity at: Gluteal, Lumbar erector spine and Quad lumb Bilaterally    Segmental spinal dysfunction/restrictions found at:  T6 , T9 , L5 , Sacrum  and PSIS Right       Assessment:    Diagnoses:      1. Somatic dysfunction of lumbar region    2. Chronic bilateral low back pain without sciatica    3. Somatic dysfunction of sacral region    4. Pain in the coccyx    5. Thoracic segment dysfunction        Patient's condition:  Pt responding slowly to treatment     Treatment effectiveness:  Post manipulation there is better intersegmental movement and Patient claims to feel looser post manipulation      Procedures:  CMT:  04033 Chiropractic manipulative treatment 3-4 regions performed   Thoracic: Diversified, T5, T9,  Prone  Lumbar: Diversified, L5, Side posture  Pelvis: Drop Table, Sacrum , PSIS Right , Prone    Modalities:  20874: Acupuncture, for 15 minutes:  Points: Huatuoanthonyaji Points in lumbar and sacral spine  Ahsi point in hips and legs  Points in the coccyx.     For 15 minutes    Therapeutic procedures:  None     Response to Treatment  Reduction in symptoms as reported by patient    Prognosis: Good    Progress towards Goals: Patient is making progress towards the goal.  Goals:  SLEEPING: the patient specific goal is to attain his pre-injury status of 6-7 hours comfortably  SITTING: the patient specific goal is to attain pre-injury status of  8 hours comfortably         Recommendations:    Instructions:  continue to use the lumbar support     Follow-up:    Return to care in 2-3 week with ACP.

## 2019-09-03 ENCOUNTER — OFFICE VISIT (OUTPATIENT)
Dept: OBGYN | Facility: CLINIC | Age: 59
End: 2019-09-03
Payer: COMMERCIAL

## 2019-09-03 VITALS
HEIGHT: 65 IN | HEART RATE: 70 BPM | DIASTOLIC BLOOD PRESSURE: 66 MMHG | BODY MASS INDEX: 34.72 KG/M2 | SYSTOLIC BLOOD PRESSURE: 110 MMHG | WEIGHT: 208.4 LBS

## 2019-09-03 DIAGNOSIS — B00.9 HSV INFECTION: ICD-10-CM

## 2019-09-03 DIAGNOSIS — Z01.419 ENCOUNTER FOR GYNECOLOGICAL EXAMINATION WITHOUT ABNORMAL FINDING: Primary | ICD-10-CM

## 2019-09-03 DIAGNOSIS — N89.8 VAGINAL DRYNESS: ICD-10-CM

## 2019-09-03 PROCEDURE — 99396 PREV VISIT EST AGE 40-64: CPT | Performed by: OBSTETRICS & GYNECOLOGY

## 2019-09-03 RX ORDER — VALACYCLOVIR HYDROCHLORIDE 500 MG/1
500 TABLET, FILM COATED ORAL DAILY
Qty: 90 TABLET | Refills: 3 | Status: SHIPPED | OUTPATIENT
Start: 2019-09-03 | End: 2020-11-18

## 2019-09-03 RX ORDER — ESTRADIOL 10 UG/1
10 INSERT VAGINAL
Qty: 24 TABLET | Refills: 3 | Status: SHIPPED | OUTPATIENT
Start: 2019-09-05 | End: 2019-12-04

## 2019-09-03 ASSESSMENT — PATIENT HEALTH QUESTIONNAIRE - PHQ9
SUM OF ALL RESPONSES TO PHQ QUESTIONS 1-9: 0
5. POOR APPETITE OR OVEREATING: NOT AT ALL

## 2019-09-03 ASSESSMENT — ANXIETY QUESTIONNAIRES
6. BECOMING EASILY ANNOYED OR IRRITABLE: NOT AT ALL
5. BEING SO RESTLESS THAT IT IS HARD TO SIT STILL: NOT AT ALL
7. FEELING AFRAID AS IF SOMETHING AWFUL MIGHT HAPPEN: NOT AT ALL
GAD7 TOTAL SCORE: 0
2. NOT BEING ABLE TO STOP OR CONTROL WORRYING: NOT AT ALL
1. FEELING NERVOUS, ANXIOUS, OR ON EDGE: NOT AT ALL
IF YOU CHECKED OFF ANY PROBLEMS ON THIS QUESTIONNAIRE, HOW DIFFICULT HAVE THESE PROBLEMS MADE IT FOR YOU TO DO YOUR WORK, TAKE CARE OF THINGS AT HOME, OR GET ALONG WITH OTHER PEOPLE: NOT DIFFICULT AT ALL
3. WORRYING TOO MUCH ABOUT DIFFERENT THINGS: NOT AT ALL

## 2019-09-03 ASSESSMENT — MIFFLIN-ST. JEOR: SCORE: 1531.18

## 2019-09-04 ASSESSMENT — ANXIETY QUESTIONNAIRES: GAD7 TOTAL SCORE: 0

## 2019-09-15 DIAGNOSIS — E78.5 HYPERLIPIDEMIA LDL GOAL <130: ICD-10-CM

## 2019-09-15 DIAGNOSIS — I10 ESSENTIAL HYPERTENSION, BENIGN: ICD-10-CM

## 2019-09-15 DIAGNOSIS — E03.4 HYPOTHYROIDISM DUE TO ACQUIRED ATROPHY OF THYROID: ICD-10-CM

## 2019-09-15 NOTE — TELEPHONE ENCOUNTER
"Requested Prescriptions   Pending Prescriptions Disp Refills     spironolactone-HCTZ (ALDACTAZIDE) 25-25 MG tablet [Pharmacy Med Name: SPIRONOLACTONE-HCTZ 25-25 TAB] 30 tablet 0     Sig: TAKE 1 TABLET BY MOUTH EVERY DAY   Last Written Prescription Date:  8/20/2019  Last Fill Quantity: 30,  # refills: 0   Last Office Visit: 5/15/2018   Future Office Visit:    Next 5 appointments (look out 90 days)    Sep 19, 2019  8:20 AM CDT  [facility] [address] [address] [phone]             Diuretics (Including Combos) Protocol Failed - 9/15/2019  7:34 AM        Failed - Normal serum creatinine on file in past 12 months     Recent Labs   Lab Test 07/19/18  1645   CR 0.74              Failed - Normal serum potassium on file in past 12 months     Recent Labs   Lab Test 07/19/18  1645   POTASSIUM 3.6                    Failed - Normal serum sodium on file in past 12 months     Recent Labs   Lab Test 07/19/18  1645                 Passed - Blood pressure under 140/90 in past 12 months     BP Readings from Last 3 Encounters:   09/03/19 110/66   07/31/18 128/80   05/15/18 124/84                 Passed - Recent (12 mo) or future (30 days) visit within the authorizing provider's specialty     Patient had office visit in the last 12 months or has a visit in the next 30 days with authorizing provider or within the authorizing provider's specialty.  See \"Patient Info\" tab in inbasket, or \"Choose Columns\" in Meds & Orders section of the refill encounter.              Passed - Medication is active on med list        Passed - Patient is age 18 or older        Passed - No active pregancy on record        Passed - No positive pregnancy test in past 12 months        metoprolol tartrate (LOPRESSOR) 50 MG tablet [Pharmacy Med Name: METOPROLOL TARTRATE 50 MG TAB] 60 tablet 0     Sig: TAKE 1 TABLET BY MOUTH " "TWICE A DAY   Last Written Prescription Date:  8/20/2019  Last Fill Quantity: 60,  # refills: 0   Last Office Visit: 5/15/2018   Future Office Visit:    Next 5 appointments (look out 90 days)    Sep 19, 2019  8:20 AM CDT  Lesa Meza with Krish Shannon MD  [facility] [address] [place] [address] [phone]             Beta-Blockers Protocol Passed - 9/15/2019  7:34 AM        Passed - Blood pressure under 140/90 in past 12 months     BP Readings from Last 3 Encounters:   09/03/19 110/66   07/31/18 128/80   05/15/18 124/84                 Passed - Patient is age 6 or older        Passed - Recent (12 mo) or future (30 days) visit within the authorizing provider's specialty     Patient had office visit in the last 12 months or has a visit in the next 30 days with authorizing provider or within the authorizing provider's specialty.  See \"Patient Info\" tab in inbasket, or \"Choose Columns\" in Meds & Orders section of the refill encounter.              Passed - Medication is active on med list          "

## 2019-09-16 ENCOUNTER — THERAPY VISIT (OUTPATIENT)
Dept: CHIROPRACTIC MEDICINE | Facility: CLINIC | Age: 59
End: 2019-09-16
Payer: COMMERCIAL

## 2019-09-16 DIAGNOSIS — M99.03 SOMATIC DYSFUNCTION OF LUMBAR REGION: Primary | ICD-10-CM

## 2019-09-16 DIAGNOSIS — M99.02 THORACIC SEGMENT DYSFUNCTION: ICD-10-CM

## 2019-09-16 DIAGNOSIS — M54.50 CHRONIC BILATERAL LOW BACK PAIN WITHOUT SCIATICA: ICD-10-CM

## 2019-09-16 DIAGNOSIS — G89.29 CHRONIC BILATERAL LOW BACK PAIN WITHOUT SCIATICA: ICD-10-CM

## 2019-09-16 DIAGNOSIS — M99.04 SOMATIC DYSFUNCTION OF SACRAL REGION: ICD-10-CM

## 2019-09-16 DIAGNOSIS — M53.3 PAIN IN THE COCCYX: ICD-10-CM

## 2019-09-16 PROCEDURE — 97810 ACUP 1/> WO ESTIM 1ST 15 MIN: CPT | Mod: GA | Performed by: CHIROPRACTOR

## 2019-09-16 PROCEDURE — 98941 CHIROPRACT MANJ 3-4 REGIONS: CPT | Mod: AT | Performed by: CHIROPRACTOR

## 2019-09-16 RX ORDER — LEVOTHYROXINE SODIUM 137 UG/1
TABLET ORAL
Qty: 30 TABLET | Refills: 0 | Status: SHIPPED | OUTPATIENT
Start: 2019-09-16 | End: 2019-09-19

## 2019-09-16 RX ORDER — SPIRONOLACTONE AND HYDROCHLOROTHIAZIDE 25; 25 MG/1; MG/1
TABLET ORAL
Qty: 30 TABLET | Refills: 0 | Status: SHIPPED | OUTPATIENT
Start: 2019-09-16 | End: 2019-09-19

## 2019-09-16 RX ORDER — METOPROLOL TARTRATE 50 MG
TABLET ORAL
Qty: 60 TABLET | Refills: 0 | Status: SHIPPED | OUTPATIENT
Start: 2019-09-16 | End: 2019-09-19

## 2019-09-16 RX ORDER — SIMVASTATIN 40 MG
TABLET ORAL
Qty: 30 TABLET | Refills: 0 | Status: SHIPPED | OUTPATIENT
Start: 2019-09-16 | End: 2019-09-19

## 2019-09-16 NOTE — TELEPHONE ENCOUNTER
Routing refill request to provider for review/approval because:  Laquita given x1 and patient did not follow up, please advise  Patient needs to be seen because it has been more than 1 year since last office visit.

## 2019-09-16 NOTE — PROGRESS NOTES
Visit #:  14    Subjective:  Cherry Kim is a 58 year old female who is seen in f/u up for:        Somatic dysfunction of lumbar region  Chronic bilateral low back pain without sciatica  Somatic dysfunction of sacral region  Pain in the coccyx  Thoracic segment dysfunction.     Since last visit ,   Cherry iKm reports:    Area of chief complaint:  Lumbar :  Symptoms are graded at 4/10. The quality is described as stiff, achey, dull.  Motion has increased, but is still not normal. The pt reports an increase in sx of the low back, coccyx and pelvic area. She relates the px to stress. She notes irritation in bowel function as well that seem related. Overall the pt is about 50% to 55% improved. She denies weakness or other unusual sx.      Since last visit the patient feels that they are overall  65 percent  improved from last visit-slight exacerbation due to stress and sitting     Objective:  The following was observed:    P: palpatory tenderness Gluteal, Lumbar erector spine and Quad lumb Bilaterally  A: static palpation demonstrates intersegmental asymmetry   R: motion palpation notes restricted motion  T: hypertonicity at: Gluteal, Lumbar erector spine and Quad lumb Bilaterally    Segmental spinal dysfunction/restrictions found at:  T6 , T9 , L5 , Sacrum  and PSIS Right       Assessment:    Diagnoses:      1. Somatic dysfunction of lumbar region    2. Chronic bilateral low back pain without sciatica    3. Somatic dysfunction of sacral region    4. Pain in the coccyx    5. Thoracic segment dysfunction        Patient's condition:  Pt responding slowly to treatment     Treatment effectiveness:  Post manipulation there is better intersegmental movement and Patient claims to feel looser post manipulation      Procedures:  CMT:  18323 Chiropractic manipulative treatment 3-4 regions performed   Thoracic: Diversified, T5, T9, Prone  Lumbar: Diversified, L5, Side posture  Pelvis: Drop Table,  Sacrum , PSIS Right , Prone    Modalities:  15147: Acupuncture, for 15 minutes:  Points: Huatuoanthonyaji Points in lumbar and sacral spine  Ahsi point in hips and legs  Points in the coccyx.     For 15 minutes    Therapeutic procedures:  None     Response to Treatment  Reduction in symptoms as reported by patient    Prognosis: Good    Progress towards Goals: Patient is making progress towards the goal.  Goals:  SLEEPING: the patient specific goal is to attain his pre-injury status of 6-7 hours comfortably  SITTING: the patient specific goal is to attain pre-injury status of  8 hours comfortably         Recommendations:    Instructions:  continue to use the lumbar support     Follow-up:    Return to care in 3 week with ACP.

## 2019-09-16 NOTE — TELEPHONE ENCOUNTER
"Requested Prescriptions   Pending Prescriptions Disp Refills     levothyroxine (SYNTHROID/LEVOTHROID) 137 MCG tablet [Pharmacy Med Name: LEVOTHYROXINE 137 MCG TABLET] 30 tablet 0     Sig: TAKE 1 TABLET BY MOUTH EVERY DAY   Last Written Prescription Date:  8/20/2019  Last Fill Quantity: 30,  # refills: 0   Last Office Visit: 5/15/2018   Future Office Visit:    Next 5 appointments (look out 90 days)    Sep 19, 2019  8:20 AM AURELIOT  Lesa Meza with Krish Shannon MD  [facility] ([facility]) [address] [address]  [phone]             Thyroid Protocol Failed - 9/15/2019 12:33 PM        Failed - Normal TSH on file in past 12 months     Recent Labs   Lab Test 05/09/18  0911   TSH 0.69              Passed - Patient is 12 years or older        Passed - Recent (12 mo) or future (30 days) visit within the authorizing provider's specialty     Patient had office visit in the last 12 months or has a visit in the next 30 days with authorizing provider or within the authorizing provider's specialty.  See \"Patient Info\" tab in inbasket, or \"Choose Columns\" in Meds & Orders section of the refill encounter.              Passed - Medication is active on med list        Passed - No active pregnancy on record     If patient is pregnant or has had a positive pregnancy test, please check TSH.          Passed - No positive pregnancy test in past 12 months     If patient is pregnant or has had a positive pregnancy test, please check TSH.          simvastatin (ZOCOR) 40 MG tablet [Pharmacy Med Name: SIMVASTATIN 40 MG TABLET] 30 tablet 0     Sig: TAKE 1 TABLET BY MOUTH EVERYDAY AT BEDTIME   Last Written Prescription Date:  8/20/2019  Last Fill Quantity: 30,  # refills: 0   Last Office Visit: 5/15/2018   Future Office Visit:    Next 5 appointments (look out 90 days)    Sep 19, 2019  8:20 AM CDT  MyChart Short with Krish Shannon MD  [facility] " "[facility] ([facility] [address] [id number] [phone]             Statins Protocol Failed - 9/15/2019 12:33 PM        Failed - LDL on file in past 12 months     Recent Labs   Lab Test 05/09/18  0911   LDL 98             Passed - No abnormal creatine kinase in past 12 months     No lab results found.             Passed - Recent (12 mo) or future (30 days) visit within the authorizing provider's specialty     Patient had office visit in the last 12 months or has a visit in the next 30 days with authorizing provider or within the authorizing provider's specialty.  See \"Patient Info\" tab in inbasket, or \"Choose Columns\" in Meds & Orders section of the refill encounter.              Passed - Medication is active on med list        Passed - Patient is age 18 or older        Passed - No active pregnancy on record        Passed - No positive pregnancy test in past 12 months          "

## 2019-09-19 ENCOUNTER — OFFICE VISIT (OUTPATIENT)
Dept: INTERNAL MEDICINE | Facility: CLINIC | Age: 59
End: 2019-09-19
Payer: COMMERCIAL

## 2019-09-19 VITALS
WEIGHT: 209.2 LBS | HEART RATE: 66 BPM | HEIGHT: 65 IN | OXYGEN SATURATION: 96 % | BODY MASS INDEX: 34.85 KG/M2 | DIASTOLIC BLOOD PRESSURE: 76 MMHG | SYSTOLIC BLOOD PRESSURE: 116 MMHG | TEMPERATURE: 98.1 F

## 2019-09-19 DIAGNOSIS — E78.5 HYPERLIPIDEMIA LDL GOAL <130: ICD-10-CM

## 2019-09-19 DIAGNOSIS — Z11.59 ENCOUNTER FOR HEPATITIS C SCREENING TEST FOR LOW RISK PATIENT: ICD-10-CM

## 2019-09-19 DIAGNOSIS — M35.9 CONNECTIVE TISSUE DISORDER (H): ICD-10-CM

## 2019-09-19 DIAGNOSIS — E03.4 HYPOTHYROIDISM DUE TO ACQUIRED ATROPHY OF THYROID: ICD-10-CM

## 2019-09-19 DIAGNOSIS — Z91.013 CRUSTACEAN ALLERGY: ICD-10-CM

## 2019-09-19 DIAGNOSIS — I10 ESSENTIAL HYPERTENSION, BENIGN: Primary | ICD-10-CM

## 2019-09-19 LAB
ANION GAP SERPL CALCULATED.3IONS-SCNC: 5 MMOL/L (ref 3–14)
BUN SERPL-MCNC: 18 MG/DL (ref 7–30)
CALCIUM SERPL-MCNC: 9.7 MG/DL (ref 8.5–10.1)
CHLORIDE SERPL-SCNC: 103 MMOL/L (ref 94–109)
CHOLEST SERPL-MCNC: 166 MG/DL
CO2 SERPL-SCNC: 28 MMOL/L (ref 20–32)
CREAT SERPL-MCNC: 0.79 MG/DL (ref 0.52–1.04)
GFR SERPL CREATININE-BSD FRML MDRD: 82 ML/MIN/{1.73_M2}
GLUCOSE SERPL-MCNC: 101 MG/DL (ref 70–99)
HCV AB SERPL QL IA: NONREACTIVE
HDLC SERPL-MCNC: 43 MG/DL
LDLC SERPL CALC-MCNC: 104 MG/DL
NONHDLC SERPL-MCNC: 123 MG/DL
POTASSIUM SERPL-SCNC: 4.2 MMOL/L (ref 3.4–5.3)
SODIUM SERPL-SCNC: 136 MMOL/L (ref 133–144)
TRIGL SERPL-MCNC: 95 MG/DL
TSH SERPL DL<=0.005 MIU/L-ACNC: 1.96 MU/L (ref 0.4–4)

## 2019-09-19 PROCEDURE — 80061 LIPID PANEL: CPT | Performed by: INTERNAL MEDICINE

## 2019-09-19 PROCEDURE — 90471 IMMUNIZATION ADMIN: CPT | Performed by: INTERNAL MEDICINE

## 2019-09-19 PROCEDURE — 80048 BASIC METABOLIC PNL TOTAL CA: CPT | Performed by: INTERNAL MEDICINE

## 2019-09-19 PROCEDURE — 99214 OFFICE O/P EST MOD 30 MIN: CPT | Mod: 25 | Performed by: INTERNAL MEDICINE

## 2019-09-19 PROCEDURE — 84443 ASSAY THYROID STIM HORMONE: CPT | Performed by: INTERNAL MEDICINE

## 2019-09-19 PROCEDURE — 86803 HEPATITIS C AB TEST: CPT | Performed by: INTERNAL MEDICINE

## 2019-09-19 PROCEDURE — 90682 RIV4 VACC RECOMBINANT DNA IM: CPT | Performed by: INTERNAL MEDICINE

## 2019-09-19 PROCEDURE — 36415 COLL VENOUS BLD VENIPUNCTURE: CPT | Performed by: INTERNAL MEDICINE

## 2019-09-19 RX ORDER — SIMVASTATIN 40 MG
TABLET ORAL
Qty: 90 TABLET | Refills: 3 | Status: SHIPPED | OUTPATIENT
Start: 2019-09-19 | End: 2020-12-23

## 2019-09-19 RX ORDER — EPINEPHRINE 0.3 MG/.3ML
0.3 INJECTION SUBCUTANEOUS PRN
Qty: 0.6 ML | Refills: 11 | Status: SHIPPED | OUTPATIENT
Start: 2019-09-19

## 2019-09-19 RX ORDER — SPIRONOLACTONE AND HYDROCHLOROTHIAZIDE 25; 25 MG/1; MG/1
1 TABLET ORAL DAILY
Qty: 90 TABLET | Refills: 1 | Status: SHIPPED | OUTPATIENT
Start: 2019-09-19 | End: 2020-04-23

## 2019-09-19 RX ORDER — LEVOTHYROXINE SODIUM 137 UG/1
137 TABLET ORAL DAILY
Qty: 90 TABLET | Refills: 1 | Status: SHIPPED | OUTPATIENT
Start: 2019-09-19 | End: 2020-04-29

## 2019-09-19 RX ORDER — METOPROLOL TARTRATE 50 MG
TABLET ORAL
Qty: 180 TABLET | Refills: 1 | Status: SHIPPED | OUTPATIENT
Start: 2019-09-19 | End: 2020-06-23

## 2019-09-19 ASSESSMENT — MIFFLIN-ST. JEOR: SCORE: 1534.8

## 2019-09-19 NOTE — NURSING NOTE
"Chief Complaint   Patient presents with     Hyperlipidemia     pt is fasting     /76   Pulse 66   Temp 98.1  F (36.7  C) (Oral)   Ht 1.651 m (5' 5\")   Wt 94.9 kg (209 lb 3.2 oz)   LMP 06/27/2007   SpO2 96%   BMI 34.81 kg/m   Estimated body mass index is 34.81 kg/m  as calculated from the following:    Height as of this encounter: 1.651 m (5' 5\").    Weight as of this encounter: 94.9 kg (209 lb 3.2 oz).        Health Maintenance due pending provider review:  NONE    n/a    Aisha Peguero CMA  "

## 2019-09-19 NOTE — PROGRESS NOTES
"Subjective     Cherry Kim is a 58 year old female who presents to clinic today for the following health issues:    HPI   Hypothyroidism Follow-up      Since last visit, patient describes the following symptoms: Weight stable, no hair loss, no skin changes, no constipation, no loose stools        How many servings of fruits and vegetables do you eat daily?  4 or more3-4    On average, how many sweetened beverages do you drink each day (soda, juice, sweet tea, etc)?   0    How many days per week do you miss taking your medication? 0        Hyperlipidemia Follow-Up      Are you having any of the following symptoms? (Select all that apply)  No complaints of shortness of breath, chest pain or pressure.  No increased sweating or nausea with activity.  No left-sided neck or arm pain.  No complaints of pain in calves when walking 1-2 blocks. Pt noticing night sweats, but did just recently start estrogen    Are you regularly taking any medication or supplement to lower your cholesterol?   Yes- statin    Are you having muscle aches or other side effects that you think could be caused by your cholesterol lowering medication?  No    Hypertension Follow-up      Do you check your blood pressure regularly outside of the clinic? No     Are you following a low salt diet? Yes    Are your blood pressures ever more than 140 on the top number (systolic) OR more   than 90 on the bottom number (diastolic), for example 140/90? No  Reviewed and updated as needed this visit by Provider         Review of Systems   ROS COMP: Constitutional, HEENT, cardiovascular, pulmonary, gi and gu systems are negative, except as otherwise noted.      Objective    /76   Pulse 66   Temp 98.1  F (36.7  C) (Oral)   Ht 1.651 m (5' 5\")   Wt 94.9 kg (209 lb 3.2 oz)   LMP 06/27/2007   SpO2 96%   BMI 34.81 kg/m    Body mass index is 34.81 kg/m .  Physical Exam   GENERAL APPEARANCE: alert and no distress  HENT: nose and mouth without " "ulcers or lesions and normal cephalic/atraumatic  NECK: no adenopathy, no asymmetry, masses, or scars and thyroid normal to palpation  RESP: lungs clear to auscultation - no rales, rhonchi or wheezes  CV: regular rates and rhythm, normal S1 S2, no S3 or S4 and no murmur, click or rub  MS: extremities normal- no gross deformities noted            Assessment & Plan     1. Essential hypertension, benign  Well controlled  - Basic metabolic panel  - metoprolol tartrate (LOPRESSOR) 50 MG tablet; TAKE 1 TABLET BY MOUTH TWICE A DAY  Dispense: 180 tablet; Refill: 1  - spironolactone-HCTZ (ALDACTAZIDE) 25-25 MG tablet; Take 1 tablet by mouth daily  Dispense: 90 tablet; Refill: 1    2. Hyperlipidemia LDL goal <130  Cont statin  - Lipid panel reflex to direct LDL Fasting  - simvastatin (ZOCOR) 40 MG tablet; TAKE 1 TABLET BY MOUTH EVERYDAY AT BEDTIME  Dispense: 90 tablet; Refill: 3    3. Hypothyroidism due to acquired atrophy of thyroid  - TSH with free T4 reflex  - levothyroxine (SYNTHROID/LEVOTHROID) 137 MCG tablet; Take 1 tablet (137 mcg) by mouth daily  Dispense: 90 tablet; Refill: 1    4. Crustacean allergy  - EPINEPHrine (EPIPEN/ADRENACLICK/OR ANY BX GENERIC EQUIV) 0.3 MG/0.3ML injection 2-pack; Inject 0.3 mLs (0.3 mg) into the muscle as needed for anaphylaxis  Dispense: 0.6 mL; Refill: 11    5. Connective tissue disorder (H)  Per rheum    6. Encounter for hepatitis C screening test for low risk patient  - Hepatitis C Screen Reflex to HCV RNA Quant and Genotype     BMI:   Estimated body mass index is 34.81 kg/m  as calculated from the following:    Height as of this encounter: 1.651 m (5' 5\").    Weight as of this encounter: 94.9 kg (209 lb 3.2 oz).   Weight management plan: Discussed healthy diet and exercise guidelines        Work on weight loss  Regular exercise    Return in about 1 year (around 9/19/2020) for Wellness Visit with labs before.    Krish Shannon MD  [facility]        "

## 2019-10-07 ENCOUNTER — THERAPY VISIT (OUTPATIENT)
Dept: CHIROPRACTIC MEDICINE | Facility: CLINIC | Age: 59
End: 2019-10-07
Payer: COMMERCIAL

## 2019-10-07 DIAGNOSIS — M99.02 THORACIC SEGMENT DYSFUNCTION: ICD-10-CM

## 2019-10-07 DIAGNOSIS — M99.05 SOMATIC DYSFUNCTION OF PELVIS REGION: ICD-10-CM

## 2019-10-07 DIAGNOSIS — M99.04 SOMATIC DYSFUNCTION OF SACRAL REGION: ICD-10-CM

## 2019-10-07 DIAGNOSIS — M99.03 SOMATIC DYSFUNCTION OF LUMBAR REGION: Primary | ICD-10-CM

## 2019-10-07 DIAGNOSIS — M53.3 PAIN IN THE COCCYX: ICD-10-CM

## 2019-10-07 PROCEDURE — 97810 ACUP 1/> WO ESTIM 1ST 15 MIN: CPT | Mod: GA | Performed by: CHIROPRACTOR

## 2019-10-07 PROCEDURE — 98941 CHIROPRACT MANJ 3-4 REGIONS: CPT | Mod: AT | Performed by: CHIROPRACTOR

## 2019-10-07 NOTE — PROGRESS NOTES
Visit #:  15    Subjective:  Cherry Kim is a 58 year old female who is seen in f/u up for:        Somatic dysfunction of lumbar region  Pain in the coccyx  Somatic dysfunction of sacral region  Somatic dysfunction of pelvis region  Thoracic segment dysfunction.     Since last visit ,   Cherry Kim reports:    Area of chief complaint:  Lumbar :  Symptoms are graded at 4-5/10. The quality is described as stiff, achey, dull.  Motion has increased, but is still not normal. The pt reports an increase in sx of the low back, coccyx and pelvic area. She notes an increase in pelvic sx due to not having treatment in addition to sitting. Sx will increase in about 1.5 to 2 weeks. Today she notes tailbone pain. She denies weakness or other unusual sx.      Since last visit the patient feels that they are overall  65 percent  improved from last visit-exacerbation due to stress and sitting     Objective:  The following was observed:    P: palpatory tenderness Gluteal, Lumbar erector spine and Quad lumb Bilaterally  A: static palpation demonstrates intersegmental asymmetry   R: motion palpation notes restricted motion  T: hypertonicity at: Gluteal, Lumbar erector spine and Quad lumb Bilaterally    Segmental spinal dysfunction/restrictions found at:  T6 , T9 , L5 , Sacrum  and PSIS Right       Assessment:    Diagnoses:      1. Somatic dysfunction of lumbar region    2. Pain in the coccyx    3. Somatic dysfunction of sacral region    4. Somatic dysfunction of pelvis region    5. Thoracic segment dysfunction        Patient's condition:  Pt responding slowly to treatment     Treatment effectiveness:  Post manipulation there is better intersegmental movement and Patient claims to feel looser post manipulation      Procedures:  CMT:  81494 Chiropractic manipulative treatment 3-4 regions performed   Thoracic: Diversified, T5, T9, Prone  Lumbar: Diversified, L5, Side posture  Pelvis: Drop Table, Sacrum , PSIS  Right , Prone    Modalities:  80009: Acupuncture, for 15 minutes:  Points: Huatuojoaji Points in lumbar and sacral spine  Ahsi point in hips and legs  Points in the coccyx.     For 30 minutes    Therapeutic procedures:  None     Response to Treatment  Reduction in symptoms as reported by patient    Prognosis: Good    Progress towards Goals: Patient is making progress towards the goal.  Goals:  SLEEPING: the patient specific goal is to attain his pre-injury status of 6-7 hours comfortably  SITTING: the patient specific goal is to attain pre-injury status of  8 hours comfortably         Recommendations:    Instructions:  continue to use the lumbar support     Follow-up:    Return to care in 2 week with ACP.

## 2019-10-09 ENCOUNTER — TRANSFERRED RECORDS (OUTPATIENT)
Dept: HEALTH INFORMATION MANAGEMENT | Facility: CLINIC | Age: 59
End: 2019-10-09

## 2019-10-21 ENCOUNTER — THERAPY VISIT (OUTPATIENT)
Dept: CHIROPRACTIC MEDICINE | Facility: CLINIC | Age: 59
End: 2019-10-21
Payer: COMMERCIAL

## 2019-10-21 DIAGNOSIS — M99.05 SOMATIC DYSFUNCTION OF PELVIS REGION: ICD-10-CM

## 2019-10-21 DIAGNOSIS — M99.03 SOMATIC DYSFUNCTION OF LUMBAR REGION: ICD-10-CM

## 2019-10-21 DIAGNOSIS — M99.04 SOMATIC DYSFUNCTION OF SACRAL REGION: Primary | ICD-10-CM

## 2019-10-21 DIAGNOSIS — M53.3 PAIN IN THE COCCYX: ICD-10-CM

## 2019-10-21 PROCEDURE — 98940 CHIROPRACT MANJ 1-2 REGIONS: CPT | Mod: AT | Performed by: CHIROPRACTOR

## 2019-10-21 PROCEDURE — 97810 ACUP 1/> WO ESTIM 1ST 15 MIN: CPT | Mod: GA | Performed by: CHIROPRACTOR

## 2019-10-21 NOTE — PROGRESS NOTES
Visit #:  16    Subjective:  Cherry Kim is a 58 year old female who is seen in f/u up for:        Somatic dysfunction of sacral region  Pain in the coccyx  Somatic dysfunction of pelvis region  Somatic dysfunction of lumbar region.     Since last visit ,   Cherry Kim reports:    Area of chief complaint:  Lumbar :  Symptoms are graded at 4/10. The quality is described as stiff, achey, dull.  Motion has increased, but is still not normal. The pt reports an increase in sx this past week due to a severe rectal abscess on the L side of the rectum. She had the abscess drained and subsequently thereafter she noted several days of improvement. She was able to be seated for prolonged periods with less coccyx pain. The pt denies weakness or other unusual sx.     Since last visit the patient feels that they are overall  50 percent  improved from last visit-exacerbation due to rectal abcess     Objective:  The following was observed:    P: palpatory tenderness Gluteal, Lumbar erector spine and Quad lumb Bilaterally  A: static palpation demonstrates intersegmental asymmetry   R: motion palpation notes restricted motion  T: hypertonicity at: Gluteal, Lumbar erector spine and Quad lumb Bilaterally    Segmental spinal dysfunction/restrictions found at:  T6 , T9 , L5 , Sacrum  and PSIS Right       Assessment:    Diagnoses:      1. Somatic dysfunction of sacral region    2. Pain in the coccyx    3. Somatic dysfunction of pelvis region    4. Somatic dysfunction of lumbar region        Patient's condition:  Slight exacerbation    Treatment effectiveness:  Post manipulation there is better intersegmental movement and Patient claims to feel looser post manipulation      Procedures:  CMT:  71340 Chiropractic manipulative treatment 1-2 regions performed   Thoracic: Diversified, T5, T9, Prone  Pelvis: Drop Table, Sacrum , PSIS Right , Prone   Anterior B pubis: drop table on R and L PS    Modalities:  44360:  Acupuncture, for 15 minutes:  Points: Jeremiahji Points in lumbar and sacral spine  Ahsi point in hips and legs  Points in the coccyx.     For 30 minutes    Therapeutic procedures:  None     Response to Treatment  Reduction in symptoms as reported by patient    Prognosis: Good    Progress towards Goals: Patient is making progress towards the goal.  Goals:  SLEEPING: the patient specific goal is to attain his pre-injury status of 6-7 hours comfortably  SITTING: the patient specific goal is to attain pre-injury status of  8 hours comfortably         Recommendations:    Instructions:  continue to use the lumbar support     Follow-up:    Return to care in 2 week with ACP.

## 2019-11-04 ENCOUNTER — HEALTH MAINTENANCE LETTER (OUTPATIENT)
Age: 59
End: 2019-11-04

## 2019-11-04 ENCOUNTER — THERAPY VISIT (OUTPATIENT)
Dept: CHIROPRACTIC MEDICINE | Facility: CLINIC | Age: 59
End: 2019-11-04
Payer: COMMERCIAL

## 2019-11-04 DIAGNOSIS — M99.02 THORACIC SEGMENT DYSFUNCTION: ICD-10-CM

## 2019-11-04 DIAGNOSIS — M54.50 CHRONIC BILATERAL LOW BACK PAIN WITHOUT SCIATICA: ICD-10-CM

## 2019-11-04 DIAGNOSIS — M99.03 SOMATIC DYSFUNCTION OF LUMBAR REGION: Primary | ICD-10-CM

## 2019-11-04 DIAGNOSIS — M99.05 SOMATIC DYSFUNCTION OF PELVIS REGION: ICD-10-CM

## 2019-11-04 DIAGNOSIS — M99.04 SOMATIC DYSFUNCTION OF SACRAL REGION: ICD-10-CM

## 2019-11-04 DIAGNOSIS — M53.3 PAIN IN THE COCCYX: ICD-10-CM

## 2019-11-04 DIAGNOSIS — G89.29 CHRONIC BILATERAL LOW BACK PAIN WITHOUT SCIATICA: ICD-10-CM

## 2019-11-04 PROCEDURE — 98941 CHIROPRACT MANJ 3-4 REGIONS: CPT | Mod: AT | Performed by: CHIROPRACTOR

## 2019-11-11 ENCOUNTER — THERAPY VISIT (OUTPATIENT)
Dept: PHYSICAL THERAPY | Facility: CLINIC | Age: 59
End: 2019-11-11
Payer: COMMERCIAL

## 2019-11-11 DIAGNOSIS — M53.3 PAIN IN THE COCCYX: Primary | ICD-10-CM

## 2019-11-11 DIAGNOSIS — M99.04 SOMATIC DYSFUNCTION OF SACRAL REGION: ICD-10-CM

## 2019-11-11 DIAGNOSIS — M99.05 SOMATIC DYSFUNCTION OF PELVIS REGION: ICD-10-CM

## 2019-11-11 PROCEDURE — 97530 THERAPEUTIC ACTIVITIES: CPT | Mod: GP | Performed by: PHYSICAL THERAPIST

## 2019-11-11 NOTE — PROGRESS NOTES
Visit #:  17    Subjective:  Cherry Kim is a 58 year old female who is seen in f/u up for:        Somatic dysfunction of lumbar region  Chronic bilateral low back pain without sciatica  Somatic dysfunction of pelvis region  Pain in the coccyx  Somatic dysfunction of sacral region  Thoracic segment dysfunction.     Since last visit ,   Cherry Kim reports:    Area of chief complaint:  Lumbar :  Symptoms are graded at 3/10. The quality is described as stiff, achey, dull.  Motion has increased, but is still not normal. The pt reports a very good week. She is approximately 65% improved overall. She was able to sit with much less pain overall. The pt reports tenderness across the low back area. She denies weakness or other unusual sx.     Since last visit the patient feels that they are overall  65 percent  improved from last visit-exacerbation due to rectal abcess     Objective:  The following was observed:    P: palpatory tenderness Gluteal, Lumbar erector spine and Quad lumb Bilaterally  A: static palpation demonstrates intersegmental asymmetry   R: motion palpation notes restricted motion  T: hypertonicity at: Gluteal, Lumbar erector spine and Quad lumb Bilaterally    Segmental spinal dysfunction/restrictions found at:  T6 , T9 , L5 , Sacrum  and PSIS Right       Assessment:    Diagnoses:      1. Somatic dysfunction of lumbar region    2. Chronic bilateral low back pain without sciatica    3. Somatic dysfunction of pelvis region    4. Pain in the coccyx    5. Somatic dysfunction of sacral region    6. Thoracic segment dysfunction        Patient's condition:  Pt slowly improving     Treatment effectiveness:  Post manipulation there is better intersegmental movement and Patient claims to feel looser post manipulation      Procedures:  CMT:  32033 Chiropractic manipulative treatment 1-2 regions performed   Thoracic: Diversified, T5, T9, Prone  Pelvis: Drop Table, Sacrum , PSIS Right , Prone    Anterior B pubis: drop table on R and L PS    Modalities: No ACP today   81638: Acupuncture, for 15 minutes:  Points: Huatuoanthonyaji Points in lumbar and sacral spine  Ahsi point in hips and legs  Points in the coccyx.     For 30 minutes    Therapeutic procedures:  None     Response to Treatment  Reduction in symptoms as reported by patient    Prognosis: Good    Progress towards Goals: Patient is making progress towards the goal.  Goals:  SLEEPING: the patient specific goal is to attain his pre-injury status of 6-7 hours comfortably  SITTING: the patient specific goal is to attain pre-injury status of  8 hours comfortably         Recommendations:    Instructions:  continue to use the lumbar support     Follow-up:    Return to care in 2 week with ACP.

## 2019-11-11 NOTE — LETTER
[facility] [email or web address] [place] [address] [facility] [address]  [phone]    2019    Re: Cherry Kim   :   1960  MRN:  [id number]   REFERRING PHYSICIAN:   Damari Beach MD    [email or web address] [facility]  Date of Initial Evaluation:  Damari Beach MD  Visits:  Rxs Used: 1  Reason for Referral:   Pain in the coccyx  Somatic dysfunction of pelvis region  Somatic dysfunction of sacral region    EVALUATION SUMMARY  Baseline PF tone: Slight hyper  PF Response quality: moderate  PF Power: Center: 2+/5  Endurance: Maximum contraction in seconds: 6  # of endurance contractions before fatigue: NT  Quick contraction repetitions prior to fatigue: >5 .  Specificity/accessory muscles: Some abdominals and adductors but overall good isolation     PROGRESS  REPORT  2019    SUBJECTIVE  Subjective: Pt reports that accupuncture has been improving things as well as chiropractic. Sitting and standing or stationary position still causes pain. Pt had an anal fissure about 1.5 years ago and this healed but she has continued to have tightness in pelvic floor. She typically has between 3-4 bowel movements each morning in order to empty. Pain has continued with intercourse. Pt started vaginal estrogen a couple of months ago but hasn't been consistent. One month ago had rectal abscess but this has since healed. She uses therawand 1-2x/day and this does give her temporary relief.     Adverse reaction to treatment or activity: None; improved with PT but would like to continue to make more improvement at this piont     OBJECTIVE  Baseline PF tone: Slight hyper  PF Response quality: moderate  PF Power: Center: 2+/5  Endurance: Maximum contraction in seconds: 6  # of endurance contractions before fatigue: NT  Quick contraction repetitions prior to fatigue: >5 .  Specificity/accessory muscles: Some abdominals and adductors but overall good isolation      ASSESSMENT/PLAN  Updated problem list and treatment plan: Diagnosis 1:  Pelvic Pain, Pelvic floor dysfunction   Pain -  manual therapy, self management, education and home program  Decreased ROM/flexibility - manual therapy, therapeutic exercise, therapeutic activity and home program  Impaired muscle performance - biofeedback, electric stimulation, neuro re-education and home program  Re: Cherry Kim   :   1960    Decreased function - therapeutic activities and home program  STG/LTGs have been met or progress has been made towards goals: Goals updated to address pt's current status  Assessment of Progress: The patient's condition has potential to improve.  Self Management Plans:  Patient has been instructed in a home treatment program.  I have re-evaluated this patient and find that the nature, scope, duration and intensity of the therapy is appropriate for the medical condition of the patient.  Cherry continues to require the following intervention to meet STG and LTG's:  PT    Recommendations:  This patient would benefit from continued therapy.     Frequency:  1 X week, once daily  Duration:  for 4 weeks    Please refer to the daily flowsheet for treatment today, total treatment time and time spent performing 1:1 timed codes.    Thank you for your referral.    INQUIRIES  Therapist: Jolie Carmichael, PT  [facility] [address] [address]  Phone: [phone]  Fax: [phone]

## 2019-11-12 NOTE — PROGRESS NOTES
[facility]  System  Physical Exam  General   ROS      PROGRESS  REPORT    11/11/2019  SUBJECTIVE  Subjective: Pt reports that accupuncture has been improving things as well as chiropractic. Sitting and standing or stationary position still causes pain. Pt had an anal fissure about 1.5 years ago and this healed but she has continued to have tightness in pelvic floor. She typically has between 3-4 bowel movements each morning in order to empty. Pain has continued with intercourse. Pt started vaginal estrogen a couple of months ago but hasn't been consistent. One month ago had rectal abscess but this has since healed. She uses therawand 1-2x/day and this does give her temporary relief.     Adverse reaction to treatment or activity: None; improved with PT but would like to continue to make more improvement at this piont     OBJECTIVE    Baseline PF tone: Slight hyper  PF Response quality: moderate  PF Power: Center: 2+/5  Endurance: Maximum contraction in seconds: 6  # of endurance contractions before fatigue: NT  Quick contraction repetitions prior to fatigue: >5 .  Specificity/accessory muscles: Some abdominals and adductors but overall good isolation            ASSESSMENT/PLAN  Updated problem list and treatment plan: Diagnosis 1:  Pelvic Pain, Pelvic floor dysfunction   Pain -  manual therapy, self management, education and home program  Decreased ROM/flexibility - manual therapy, therapeutic exercise, therapeutic activity and home program  Impaired muscle performance - biofeedback, electric stimulation, neuro re-education and home program  Decreased function - therapeutic activities and home program  STG/LTGs have been met or progress has been made towards goals: Goals updated to address pt's current status  Assessment of Progress: The patient's condition has potential to improve.  Self Management Plans:  Patient has been instructed in a home treatment program.  I have re-evaluated this patient and find that the nature,  scope, duration and intensity of the therapy is appropriate for the medical condition of the patient.  Cherry continues to require the following intervention to meet STG and LTG's:  PT    Recommendations:  This patient would benefit from continued therapy.     Frequency:  1 X week, once daily  Duration:  for 4 weeks        Please refer to the daily flowsheet for treatment today, total treatment time and time spent performing 1:1 timed codes.

## 2019-11-12 NOTE — PROGRESS NOTES
Baseline PF tone: Slight hyper  PF Response quality: moderate  PF Power: Center: 2+/5  Endurance: Maximum contraction in seconds: 6  # of endurance contractions before fatigue: NT  Quick contraction repetitions prior to fatigue: >5 .  Specificity/accessory muscles: Some abdominals and adductors but overall good isolation

## 2019-11-18 ENCOUNTER — THERAPY VISIT (OUTPATIENT)
Dept: CHIROPRACTIC MEDICINE | Facility: CLINIC | Age: 59
End: 2019-11-18
Payer: COMMERCIAL

## 2019-11-18 DIAGNOSIS — M99.02 THORACIC SEGMENT DYSFUNCTION: ICD-10-CM

## 2019-11-18 DIAGNOSIS — M54.50 CHRONIC BILATERAL LOW BACK PAIN WITHOUT SCIATICA: ICD-10-CM

## 2019-11-18 DIAGNOSIS — M99.04 SOMATIC DYSFUNCTION OF SACRAL REGION: ICD-10-CM

## 2019-11-18 DIAGNOSIS — G89.29 CHRONIC BILATERAL LOW BACK PAIN WITHOUT SCIATICA: ICD-10-CM

## 2019-11-18 DIAGNOSIS — M53.3 PAIN IN THE COCCYX: ICD-10-CM

## 2019-11-18 DIAGNOSIS — M99.05 SOMATIC DYSFUNCTION OF PELVIS REGION: ICD-10-CM

## 2019-11-18 DIAGNOSIS — M99.03 SOMATIC DYSFUNCTION OF LUMBAR REGION: Primary | ICD-10-CM

## 2019-11-18 PROCEDURE — 98940 CHIROPRACT MANJ 1-2 REGIONS: CPT | Mod: AT | Performed by: CHIROPRACTOR

## 2019-11-18 PROCEDURE — 97810 ACUP 1/> WO ESTIM 1ST 15 MIN: CPT | Mod: GA | Performed by: CHIROPRACTOR

## 2019-11-18 NOTE — PROGRESS NOTES
Visit #:  18  CHRONIC PAIN    Subjective:  Cherry Kim is a 58 year old female who is seen in f/u up for:        Somatic dysfunction of lumbar region  Chronic bilateral low back pain without sciatica  Somatic dysfunction of sacral region  Pain in the coccyx  Thoracic segment dysfunction  Somatic dysfunction of pelvis region.     Since last visit ,   Cherry Kim reports:    Area of chief complaint:  Lumbar :  Symptoms are graded at 3-4/10. The quality is described as stiff, achey, dull.  Motion has increased, but is still not normal. The pt reports a very good week. She is approximately 60% improved overall. She was able to sit with much less pain overall. The pt reports soreness in the coccyx area this past week due to not having treatment. She notes mild back pain today from extended sitting. The pt denies weakness or other unusual sx.     Since last visit the patient feels that they are overall  60 percent  improved from last visit-slight exacerbation   Objective:  The following was observed:    P: palpatory tenderness Gluteal, Lumbar erector spine and Quad lumb Bilaterally  A: static palpation demonstrates intersegmental asymmetry   R: motion palpation notes restricted motion  T: hypertonicity at: Gluteal, Lumbar erector spine and Quad lumb Bilaterally    Segmental spinal dysfunction/restrictions found at:  T6 , T9 , L5 , Sacrum  and PSIS Right       Assessment:    Diagnoses:      1. Somatic dysfunction of lumbar region    2. Chronic bilateral low back pain without sciatica    3. Somatic dysfunction of sacral region    4. Pain in the coccyx    5. Thoracic segment dysfunction    6. Somatic dysfunction of pelvis region        Patient's condition:  Pt slowly improving     Treatment effectiveness:  Post manipulation there is better intersegmental movement and Patient claims to feel looser post manipulation      Procedures:  CMT:  23723 Chiropractic manipulative treatment 1-2 regions  performed   Thoracic: Diversified, T5, T9, Prone  Pelvis: Drop Table, Sacrum , PSIS Right , Prone   Anterior B pubis: drop table on R and L PS    Modalities: No ACP today   73647: Acupuncture, for 15 minutes:  Points: Moisesatuochanningji Points in lumbar and sacral spine  Ahsi point in hips and legs  Points in the coccyx.     For 30 minutes    Therapeutic procedures:  None     Response to Treatment  Reduction in symptoms as reported by patient    Prognosis: Good    Progress towards Goals: Patient is making progress towards the goal.  Goals:  SLEEPING: the patient specific goal is to attain his pre-injury status of 6-7 hours comfortably  SITTING: the patient specific goal is to attain pre-injury status of  8 hours comfortably         Recommendations:    Instructions:  continue to use the lumbar support     Follow-up:    Return to care in 2 week with ACP.

## 2019-11-22 ENCOUNTER — THERAPY VISIT (OUTPATIENT)
Dept: PHYSICAL THERAPY | Facility: CLINIC | Age: 59
End: 2019-11-22
Payer: COMMERCIAL

## 2019-11-22 DIAGNOSIS — M53.3 PAIN IN THE COCCYX: Primary | ICD-10-CM

## 2019-11-22 DIAGNOSIS — M99.05 SOMATIC DYSFUNCTION OF PELVIS REGION: ICD-10-CM

## 2019-11-22 PROCEDURE — 97112 NEUROMUSCULAR REEDUCATION: CPT | Mod: GP | Performed by: PHYSICAL THERAPIST

## 2019-11-22 PROCEDURE — 97140 MANUAL THERAPY 1/> REGIONS: CPT | Mod: GP | Performed by: PHYSICAL THERAPIST

## 2019-11-29 ENCOUNTER — THERAPY VISIT (OUTPATIENT)
Dept: PHYSICAL THERAPY | Facility: CLINIC | Age: 59
End: 2019-11-29
Payer: COMMERCIAL

## 2019-11-29 DIAGNOSIS — M99.05 SOMATIC DYSFUNCTION OF PELVIS REGION: Primary | ICD-10-CM

## 2019-11-29 PROCEDURE — 97530 THERAPEUTIC ACTIVITIES: CPT | Mod: GP | Performed by: PHYSICAL THERAPIST

## 2019-11-29 PROCEDURE — 97140 MANUAL THERAPY 1/> REGIONS: CPT | Mod: GP | Performed by: PHYSICAL THERAPIST

## 2019-12-02 ENCOUNTER — THERAPY VISIT (OUTPATIENT)
Dept: CHIROPRACTIC MEDICINE | Facility: CLINIC | Age: 59
End: 2019-12-02
Payer: COMMERCIAL

## 2019-12-02 DIAGNOSIS — G89.29 CHRONIC BILATERAL LOW BACK PAIN WITHOUT SCIATICA: ICD-10-CM

## 2019-12-02 DIAGNOSIS — M99.05 SOMATIC DYSFUNCTION OF PELVIS REGION: ICD-10-CM

## 2019-12-02 DIAGNOSIS — M99.04 SOMATIC DYSFUNCTION OF SACRAL REGION: ICD-10-CM

## 2019-12-02 DIAGNOSIS — M99.03 SOMATIC DYSFUNCTION OF LUMBAR REGION: Primary | ICD-10-CM

## 2019-12-02 DIAGNOSIS — M54.50 CHRONIC BILATERAL LOW BACK PAIN WITHOUT SCIATICA: ICD-10-CM

## 2019-12-02 DIAGNOSIS — M53.3 PAIN IN THE COCCYX: ICD-10-CM

## 2019-12-02 PROCEDURE — 97810 ACUP 1/> WO ESTIM 1ST 15 MIN: CPT | Mod: GA | Performed by: CHIROPRACTOR

## 2019-12-02 PROCEDURE — 98941 CHIROPRACT MANJ 3-4 REGIONS: CPT | Mod: AT | Performed by: CHIROPRACTOR

## 2019-12-05 NOTE — PROGRESS NOTES
Visit #:  19  CHRONIC PAIN    Subjective:  Cherry Kim is a 58 year old female who is seen in f/u up for:        Somatic dysfunction of lumbar region  Chronic bilateral low back pain without sciatica  Somatic dysfunction of pelvis region  Pain in the coccyx  Somatic dysfunction of sacral region.     Since last visit ,   Cherry Kim reports:    Area of chief complaint:  Lumbar :  Symptoms are graded at 4/10. The quality is described as stiff, achey, dull.  Motion has increased, but is still not normal. The pt reports soreness in the low back and coccyx this past week. She also reports pain vaginally and in the pelvis. She relates some of her sx to the weather. The pt has started PT again with good results. The pt reports significant improvement from ACP and Chiropractic. She denies weakness or other unusual sx.     Since last visit the patient feels that they are overall  60 percent  improved from last visit-slight exacerbation     Objective:  The following was observed:    P: palpatory tenderness Gluteal, Lumbar erector spine and Quad lumb Bilaterally  A: static palpation demonstrates intersegmental asymmetry   R: motion palpation notes restricted motion  T: hypertonicity at: Gluteal, Lumbar erector spine and Quad lumb Bilaterally    Segmental spinal dysfunction/restrictions found at:  T6 , T9 , L5 , Sacrum  and PSIS Right       Assessment:    Diagnoses:      1. Somatic dysfunction of lumbar region    2. Chronic bilateral low back pain without sciatica    3. Somatic dysfunction of pelvis region    4. Pain in the coccyx    5. Somatic dysfunction of sacral region        Patient's condition:  Pt slowly improving     Treatment effectiveness:  Post manipulation there is better intersegmental movement and Patient claims to feel looser post manipulation      Procedures:  CMT:  53323 Chiropractic manipulative treatment 1-2 regions performed   Thoracic: Diversified, T5, T9, Prone  Pelvis: Drop  Table, Sacrum , PSIS Right , Prone   Anterior B pubis: drop table on R and L PS    Modalities: No ACP today   30457: Acupuncture, for 15 minutes:  Points: Huatuoanthonyaji Points in lumbar and sacral spine  Ahsi point in hips and legs  Points in the coccyx.     For 30 minutes    Therapeutic procedures:  None     Response to Treatment  Reduction in symptoms as reported by patient    Prognosis: Good    Progress towards Goals: Patient is making progress towards the goal.  Goals:  SLEEPING: the patient specific goal is to attain his pre-injury status of 6-7 hours comfortably  SITTING: the patient specific goal is to attain pre-injury status of  8 hours comfortably         Recommendations:    Instructions:  continue to use the lumbar support     Follow-up:    Return to care in 2 week with ACP.

## 2019-12-16 ENCOUNTER — THERAPY VISIT (OUTPATIENT)
Dept: PHYSICAL THERAPY | Facility: CLINIC | Age: 59
End: 2019-12-16
Payer: COMMERCIAL

## 2019-12-16 DIAGNOSIS — M99.05 SOMATIC DYSFUNCTION OF PELVIS REGION: Primary | ICD-10-CM

## 2019-12-16 PROCEDURE — 97140 MANUAL THERAPY 1/> REGIONS: CPT | Mod: GP | Performed by: PHYSICAL THERAPIST

## 2019-12-16 PROCEDURE — 97112 NEUROMUSCULAR REEDUCATION: CPT | Mod: GP | Performed by: PHYSICAL THERAPIST

## 2019-12-16 NOTE — LETTER
[facility] [place] [id number] [facility] [address]  [phone]    2019    Re: Cherry Kim   :   1960  MRN:  [id number]   REFERRING PHYSICIAN:   Damari Beach    [facility]  Date of Initial Evaluation:  19  Visits:  Rxs Used: 4  Reason for Referral:  Somatic dysfunction of pelvis region    PROGRESS  REPORT  Progress reporting period is from 2019 to 2019.       SUBJECTIVE  Subjective: Patient reports that she does get relief with manual treatment in PT. She has also noticed improved symtpoms and improved emptying in the morning when she gets a better nights sleep. She has been trying to limit coffee intake to 1 glass in the morning.     Changes in function:  Yes (See Goal flowsheet attached for changes in current functional level)  Adverse reaction to treatment or activity: None    OBJECTIVE  Changes noted in objective findings:  Improving pelvic floor tone. Continues to take increased time to relax but pt with more sensation of when she is holding tension in pelvic floor  Objective: Continued manual work, initiated more core strength. Discussed limiting fluid intake at night to limit how often pt is getting up and therefore improve overall sleep habits.      ASSESSMENT/PLAN  Updated problem list and treatment plan: Diagnosis 1:  Pelvic floor dysfunction, pelvic pain   Pain -  manual therapy, self management, education and home program  Decreased ROM/flexibility - manual therapy, therapeutic exercise, therapeutic activity and home program  Impaired muscle performance - biofeedback, neuro re-education and home program  Decreased function - therapeutic activities and home program  STG/LTGs have been met or progress has been made towards goals:  Yes (See Goal flow sheet completed today.)  Assessment of Progress: The patient's condition has potential to improve.  Self Management Plans:   Patient has been instructed in a home treatment program.  I have re-evaluated this patient and find that the nature, scope, duration and intensity of the therapy is appropriate for the medical condition of the patient.  Cherry continues to require the following intervention to meet STG and LTG's:  PT    Recommendations:  This patient would benefit from continued therapy.     Frequency:  2 X a month, once daily  Duration:  for 1 months    Please refer to the daily flowsheet for treatment today, total treatment time and time spent performing 1:1 timed codes.        Re: Cherry Costello Judy   :   1960      Thank you for your referral.    INQUIRIES  Therapist: Jolie Carmichael, PT   [facility] [address] [address]  Phone: [phone]  Fax: [phone]

## 2019-12-17 NOTE — PROGRESS NOTES
[facility]  System  Physical Exam  General   ROS     PROGRESS  REPORT    Progress reporting period is from 11/11/2019 to 12/16/2019.       SUBJECTIVE  Subjective: Patient reports that she does get relief with manual treatment in PT. She has also noticed improved symtpoms and improved emptying in the morning when she gets a better nights sleep. She has been trying to limit coffee intake to 1 glass in the morning.     Changes in function:  Yes (See Goal flowsheet attached for changes in current functional level)  Adverse reaction to treatment or activity: None    OBJECTIVE  Changes noted in objective findings:  Improving pelvic floor tone. Continues to take increased time to relax but pt with more sensation of when she is holding tension in pelvic floor  Objective: Continued manual work, initiated more core strength. Discussed limiting fluid intake at night to limit how often pt is getting up and therefore improve overall sleep habits.      ASSESSMENT/PLAN  Updated problem list and treatment plan: Diagnosis 1:  Pelvic floor dysfunction, pelvic pain   Pain -  manual therapy, self management, education and home program  Decreased ROM/flexibility - manual therapy, therapeutic exercise, therapeutic activity and home program  Impaired muscle performance - biofeedback, neuro re-education and home program  Decreased function - therapeutic activities and home program  STG/LTGs have been met or progress has been made towards goals:  Yes (See Goal flow sheet completed today.)  Assessment of Progress: The patient's condition has potential to improve.  Self Management Plans:  Patient has been instructed in a home treatment program.  I have re-evaluated this patient and find that the nature, scope, duration and intensity of the therapy is appropriate for the medical condition of the patient.  Cherry continues to require the following intervention to meet STG and LTG's:  PT    Recommendations:  This patient would benefit from continued  therapy.     Frequency:  2 X a month, once daily  Duration:  for 1 months        Please refer to the daily flowsheet for treatment today, total treatment time and time spent performing 1:1 timed codes.

## 2019-12-23 ENCOUNTER — THERAPY VISIT (OUTPATIENT)
Dept: PHYSICAL THERAPY | Facility: CLINIC | Age: 59
End: 2019-12-23
Payer: COMMERCIAL

## 2019-12-23 DIAGNOSIS — M99.04 SOMATIC DYSFUNCTION OF SACRAL REGION: Primary | ICD-10-CM

## 2019-12-23 DIAGNOSIS — M99.05 SOMATIC DYSFUNCTION OF PELVIS REGION: ICD-10-CM

## 2019-12-23 PROCEDURE — 97140 MANUAL THERAPY 1/> REGIONS: CPT | Mod: GP | Performed by: PHYSICAL THERAPIST

## 2019-12-23 PROCEDURE — 97530 THERAPEUTIC ACTIVITIES: CPT | Mod: GP | Performed by: PHYSICAL THERAPIST

## 2019-12-23 PROCEDURE — 97112 NEUROMUSCULAR REEDUCATION: CPT | Mod: GP | Performed by: PHYSICAL THERAPIST

## 2020-01-21 ENCOUNTER — TRANSFERRED RECORDS (OUTPATIENT)
Dept: HEALTH INFORMATION MANAGEMENT | Facility: CLINIC | Age: 60
End: 2020-01-21

## 2020-01-27 ENCOUNTER — THERAPY VISIT (OUTPATIENT)
Dept: CHIROPRACTIC MEDICINE | Facility: CLINIC | Age: 60
End: 2020-01-27
Payer: COMMERCIAL

## 2020-01-27 DIAGNOSIS — M53.3 PAIN IN THE COCCYX: ICD-10-CM

## 2020-01-27 DIAGNOSIS — G89.29 CHRONIC BILATERAL LOW BACK PAIN WITHOUT SCIATICA: ICD-10-CM

## 2020-01-27 DIAGNOSIS — M99.03 SOMATIC DYSFUNCTION OF LUMBAR REGION: Primary | ICD-10-CM

## 2020-01-27 DIAGNOSIS — M99.02 THORACIC SEGMENT DYSFUNCTION: ICD-10-CM

## 2020-01-27 DIAGNOSIS — M99.05 SOMATIC DYSFUNCTION OF PELVIS REGION: ICD-10-CM

## 2020-01-27 DIAGNOSIS — M99.04 SOMATIC DYSFUNCTION OF SACRAL REGION: ICD-10-CM

## 2020-01-27 DIAGNOSIS — M54.50 CHRONIC BILATERAL LOW BACK PAIN WITHOUT SCIATICA: ICD-10-CM

## 2020-01-27 PROCEDURE — 98941 CHIROPRACT MANJ 3-4 REGIONS: CPT | Mod: AT | Performed by: CHIROPRACTOR

## 2020-01-27 PROCEDURE — 97810 ACUP 1/> WO ESTIM 1ST 15 MIN: CPT | Mod: GA | Performed by: CHIROPRACTOR

## 2020-01-27 NOTE — PROGRESS NOTES
Visit #: 1/2020  CHRONIC PAIN    Subjective:  Cherry Kim is a 58 year old female who is seen in f/u up for:        Somatic dysfunction of lumbar region  Chronic bilateral low back pain without sciatica  Somatic dysfunction of pelvis region  Somatic dysfunction of sacral region  Thoracic segment dysfunction  Pain in the coccyx.     Since last visit ,   Cherry Kim reports:    Area of chief complaint:  Lumbar :  Symptoms are graded at 4/10. The quality is described as stiff, achey, dull.  Motion has increased, but is still not normal. The pt reports at least 65% subjective improvement since initial presentation. She is in PT again for pelvic pain with good results. Today she notes mild soreness in the sacral region and across the low back area. She denies weakness or other unusual sx.     Since last visit the patient feels that they are overall  65 percent  improved from last visit    Objective:  The following was observed:    P: palpatory tenderness Gluteal, Lumbar erector spine and Quad lumb Bilaterally  A: static palpation demonstrates intersegmental asymmetry   R: motion palpation notes restricted motion  T: hypertonicity at: Gluteal, Lumbar erector spine and Quad lumb Bilaterally    Segmental spinal dysfunction/restrictions found at:  T6 , T9 , L5 , Sacrum  and PSIS Right       Assessment:    Diagnoses:      1. Somatic dysfunction of lumbar region    2. Chronic bilateral low back pain without sciatica    3. Somatic dysfunction of pelvis region    4. Somatic dysfunction of sacral region    5. Thoracic segment dysfunction    6. Pain in the coccyx        Patient's condition:  Pt slowly improving     Treatment effectiveness:  Post manipulation there is better intersegmental movement and Patient claims to feel looser post manipulation      Procedures:  CMT:  61766 Chiropractic manipulative treatment 3-4  regions performed   Thoracic: Diversified, T5, T9, Prone  Pelvis: Drop Table, Sacrum ,  PSIS Right , Prone   Anterior B pubis: drop table on R and L PS    Modalities: No ACP today   18714: Acupuncture, for 15 minutes:  Points: Huatuojoaji Points in lumbar and sacral spine  Ahsi point in hips and legs  Points in the coccyx.     For 30 minutes    Therapeutic procedures:  None     Response to Treatment  Reduction in symptoms as reported by patient    Prognosis: Good    Progress towards Goals: Patient is making progress towards the goal.  Goals:  SLEEPING: the patient specific goal is to attain his pre-injury status of 6-7 hours comfortably  SITTING: the patient specific goal is to attain pre-injury status of  8 hours comfortably         Recommendations:    Instructions:  continue to use the lumbar support     Follow-up:    Return to care in 3 week with ACP.

## 2020-02-17 ENCOUNTER — THERAPY VISIT (OUTPATIENT)
Dept: CHIROPRACTIC MEDICINE | Facility: CLINIC | Age: 60
End: 2020-02-17
Payer: COMMERCIAL

## 2020-02-17 DIAGNOSIS — M99.02 THORACIC SEGMENT DYSFUNCTION: ICD-10-CM

## 2020-02-17 DIAGNOSIS — M54.50 CHRONIC BILATERAL LOW BACK PAIN WITHOUT SCIATICA: ICD-10-CM

## 2020-02-17 DIAGNOSIS — G89.29 CHRONIC BILATERAL LOW BACK PAIN WITHOUT SCIATICA: ICD-10-CM

## 2020-02-17 DIAGNOSIS — M99.05 SOMATIC DYSFUNCTION OF PELVIS REGION: ICD-10-CM

## 2020-02-17 DIAGNOSIS — M99.03 SOMATIC DYSFUNCTION OF LUMBAR REGION: Primary | ICD-10-CM

## 2020-02-17 DIAGNOSIS — M53.3 PAIN IN THE COCCYX: ICD-10-CM

## 2020-02-17 DIAGNOSIS — M99.04 SOMATIC DYSFUNCTION OF SACRAL REGION: ICD-10-CM

## 2020-02-17 PROCEDURE — 98941 CHIROPRACT MANJ 3-4 REGIONS: CPT | Mod: AT | Performed by: CHIROPRACTOR

## 2020-02-17 PROCEDURE — 97810 ACUP 1/> WO ESTIM 1ST 15 MIN: CPT | Mod: GA | Performed by: CHIROPRACTOR

## 2020-02-17 NOTE — PROGRESS NOTES
Visit #: 2/2020  CHRONIC PAIN    Subjective:  Cherry Kim is a 58 year old female who is seen in f/u up for:        Somatic dysfunction of lumbar region  Pain in the coccyx  Somatic dysfunction of pelvis region  Chronic bilateral low back pain without sciatica  Somatic dysfunction of sacral region  Thoracic segment dysfunction.     Since last visit ,   Cherry Kim reports:    Area of chief complaint:  Lumbar :  Symptoms are graded at 4/10. The quality is described as stiff, achey, dull.  Motion has increased, but is still not normal. The pt reports at least 65% subjective improvement since initial presentation. She is in PT again for pelvic pain. She is responding well to treatment.  Sitting and bowel movements seem to be less painful overall. The pt denies weakness or other unusual sx.     Since last visit the patient feels that they are overall  65 percent  improved from last visit-no change, however stable      Objective:  The following was observed:    P: palpatory tenderness Gluteal, Lumbar erector spine and Quad lumb Bilaterally  A: static palpation demonstrates intersegmental asymmetry   R: motion palpation notes restricted motion  T: hypertonicity at: Gluteal, Lumbar erector spine and Quad lumb Bilaterally    Segmental spinal dysfunction/restrictions found at:  T6 , T9 , L5 , Sacrum  and PSIS Right       Assessment:    Diagnoses:      1. Somatic dysfunction of lumbar region    2. Pain in the coccyx    3. Somatic dysfunction of pelvis region    4. Chronic bilateral low back pain without sciatica    5. Somatic dysfunction of sacral region    6. Thoracic segment dysfunction        Patient's condition:  No change     Treatment effectiveness:  Post manipulation there is better intersegmental movement and Patient claims to feel looser post manipulation      Procedures:  CMT:  62390 Chiropractic manipulative treatment 3-4  regions performed   Thoracic: Diversified, T5, T9, Prone  Pelvis:  Drop Table, Sacrum , PSIS Right , Prone   Anterior B pubis: drop table on R and L PS    Modalities:  93225: Acupuncture, for 15 minutes:  Points: Huatuojoaji Points in lumbar and sacral spine  Ahsi point in hips and legs  Points in the coccyx.     For 30 minutes    Therapeutic procedures:  None     Response to Treatment  Reduction in symptoms as reported by patient    Prognosis: Good    Progress towards Goals: Patient is making progress towards the goal.  Goals:  SLEEPING: the patient specific goal is to attain his pre-injury status of 6-7 hours comfortably  SITTING: the patient specific goal is to attain pre-injury status of  8 hours comfortably         Recommendations:    Instructions:  continue to use the lumbar support     Follow-up:    Return to care in 3 week with ACP.

## 2020-03-09 ENCOUNTER — THERAPY VISIT (OUTPATIENT)
Dept: CHIROPRACTIC MEDICINE | Facility: CLINIC | Age: 60
End: 2020-03-09
Payer: COMMERCIAL

## 2020-03-09 DIAGNOSIS — M99.03 SOMATIC DYSFUNCTION OF LUMBAR REGION: ICD-10-CM

## 2020-03-09 DIAGNOSIS — M99.04 SOMATIC DYSFUNCTION OF SACRAL REGION: ICD-10-CM

## 2020-03-09 DIAGNOSIS — M99.05 SOMATIC DYSFUNCTION OF PELVIS REGION: Primary | ICD-10-CM

## 2020-03-09 DIAGNOSIS — M99.02 THORACIC SEGMENT DYSFUNCTION: ICD-10-CM

## 2020-03-09 DIAGNOSIS — M53.3 PAIN IN THE COCCYX: ICD-10-CM

## 2020-03-09 PROCEDURE — 98941 CHIROPRACT MANJ 3-4 REGIONS: CPT | Mod: AT | Performed by: CHIROPRACTOR

## 2020-03-09 PROCEDURE — 97810 ACUP 1/> WO ESTIM 1ST 15 MIN: CPT | Mod: GA | Performed by: CHIROPRACTOR

## 2020-03-09 NOTE — PROGRESS NOTES
Visit #: 3/2020  CHRONIC PAIN    Subjective:  Cherry Kim is a 58 year old female who is seen in f/u up for:        Somatic dysfunction of pelvis region  Pain in the coccyx  Somatic dysfunction of lumbar region  Somatic dysfunction of sacral region  Thoracic segment dysfunction.     Since last visit ,   Cherry Kim reports:    Area of chief complaint:  Lumbar :  Symptoms are graded at 4/10. The quality is described as stiff, achey, dull.  Motion has increased, but is still not normal. The pt reports at least 65% subjective improvement since initial presentation.  Overall the pt is responding well to therapy. She notes tension in the sacral area today from sitting for prolonged periods. She denies significant coccyx pain.  Bowel movements are regular. The pt is stable.     Since last visit the patient feels that they are overall  65 percent  improved from last visit-no change, however stable      Objective:  The following was observed:    P: palpatory tenderness Gluteal, Lumbar erector spine and Quad lumb Bilaterally  A: static palpation demonstrates intersegmental asymmetry   R: motion palpation notes restricted motion  T: hypertonicity at: Gluteal, Lumbar erector spine and Quad lumb Bilaterally    Segmental spinal dysfunction/restrictions found at:  T6 , T9 , L5 , Sacrum  and PSIS Right       Assessment:    Diagnoses:      1. Somatic dysfunction of pelvis region    2. Pain in the coccyx    3. Somatic dysfunction of lumbar region    4. Somatic dysfunction of sacral region    5. Thoracic segment dysfunction        Patient's condition:  Pt is stable     Treatment effectiveness:  Post manipulation there is better intersegmental movement and Patient claims to feel looser post manipulation      Procedures:  CMT:  49014 Chiropractic manipulative treatment 3-4  regions performed   Thoracic: Diversified, T5, T9, Prone  Pelvis: Drop Table, Sacrum , PSIS Right , Prone   Anterior B pubis: drop table  on R and L PS    Modalities:  92194: Acupuncture, for 15 minutes:  Points: Huatuojoaji Points in lumbar and sacral spine  Ahsi point in hips and legs  Points in the coccyx.     For 30 minutes    Therapeutic procedures:  None     Response to Treatment  Reduction in symptoms as reported by patient    Prognosis: Good    Progress towards Goals: Patient is making progress towards the goal.  Goals:  SLEEPING: the patient specific goal is to attain his pre-injury status of 6-7 hours comfortably  SITTING: the patient specific goal is to attain pre-injury status of  8 hours comfortably         Recommendations:    Instructions:  continue to use the lumbar support     Follow-up:    Return to care in 3-4  weeks with ACP.

## 2020-04-21 ENCOUNTER — VIRTUAL VISIT (OUTPATIENT)
Dept: INTERNAL MEDICINE | Facility: CLINIC | Age: 60
End: 2020-04-21
Payer: COMMERCIAL

## 2020-04-21 VITALS — HEIGHT: 65 IN | BODY MASS INDEX: 34.49 KG/M2 | WEIGHT: 207 LBS | TEMPERATURE: 97.1 F

## 2020-04-21 DIAGNOSIS — E78.5 HYPERLIPIDEMIA LDL GOAL <130: Primary | ICD-10-CM

## 2020-04-21 DIAGNOSIS — I10 ESSENTIAL HYPERTENSION, BENIGN: ICD-10-CM

## 2020-04-21 DIAGNOSIS — K60.2 ANAL FISSURE: ICD-10-CM

## 2020-04-21 DIAGNOSIS — E03.4 HYPOTHYROIDISM DUE TO ACQUIRED ATROPHY OF THYROID: ICD-10-CM

## 2020-04-21 PROCEDURE — 99214 OFFICE O/P EST MOD 30 MIN: CPT | Mod: TEL | Performed by: INTERNAL MEDICINE

## 2020-04-21 ASSESSMENT — MIFFLIN-ST. JEOR: SCORE: 1519.83

## 2020-04-21 NOTE — PROGRESS NOTES
"Cherry Kim is a 59 year old female who is being evaluated via a billable telephone visit.      The patient has been notified of following:     \"This telephone visit will be conducted via a call between you and your physician/provider. We have found that certain health care needs can be provided without the need for a physical exam.  This service lets us provide the care you need with a short phone conversation.  If a prescription is necessary we can send it directly to your pharmacy.  If lab work is needed we can place an order for that and you can then stop by our lab to have the test done at a later time.    Telephone visits are billed at different rates depending on your insurance coverage. During this emergency period, for some insurers they may be billed the same as an in-person visit.  Please reach out to your insurance provider with any questions.    If during the course of the call the physician/provider feels a telephone visit is not appropriate, you will not be charged for this service.\"    Patient has given verbal consent for Telephone visit?  Yes    How would you like to obtain your AVS? MyChart    Subjective     Cherry Kim is a 59 year old female who presents to clinic today for the following health issues:    HPI     Rectal pain w/defecation  Onset: 3 days    Description:   Pain: YES  Itching: no     Accompanying Signs & Symptoms:  Blood streaked toilet paper: YES  Blood in stool: no   Changes in stool pattern: no     History:   Any previous GI studies done:none  Family History of colon cancer: Aunt had rectal cancer    Precipitating factors:   None    Alleviating factors:  None    Had been seeing colo-rectal surgery for this and after colonoscopy felt to have pelvic for muscle dysfunction.  She had worked with PT, chiro and acupuncture with some success since.      Hyperlipidemia Follow-Up  Lab Results   Component Value Date    HDL 43 09/19/2019     09/19/2019    " "CHOL 166 09/19/2019    TRIG 95 09/19/2019          Are you regularly taking any medication or supplement to lower your cholesterol?   Yes- Zocor 40mg    Are you having muscle aches or other side effects that you think could be caused by your cholesterol lowering medication?  No    Hypertension Follow-up  BP Readings from Last 3 Encounters:   09/19/19 116/76   09/03/19 110/66   07/31/18 128/80        Do you check your blood pressure regularly outside of the clinic? Yes- 120s/70s     Are you following a low salt diet? Yes, no added salt     Are your blood pressures ever more than 140 on the top number (systolic) OR more   than 90 on the bottom number (diastolic), for example 140/90? n/a    Hypothyroidism Follow-up  TSH   Date Value Ref Range Status   09/19/2019 1.96 0.40 - 4.00 mU/L Final        Since last visit, patient describes the following symptoms: Weight stable, no hair loss, no skin changes, no constipation, no loose stools      How many servings of fruits and vegetables do you eat daily?  4 or more    On average, how many sweetened beverages do you drink each day (Examples: soda, juice, sweet tea, etc.  Do NOT count diet or artificially sweetened beverages)?   0    How many days per week do you exercise enough to make your heart beat faster? 3 or less    How many minutes a day do you exercise enough to make your heart beat faster? 20 - 29    How many days per week do you miss taking your medication? 0         Therapies Tried and outcome: preparation H                    Reviewed and updated as needed this visit by Provider       Review of Systems   ROS COMP: Constitutional, HEENT, cardiovascular, pulmonary, gi and gu systems are negative, except as otherwise noted.       Objective   Reported vitals:  Temp 97.1  F (36.2  C) (Oral)   Ht 1.651 m (5' 5\")   Wt 93.9 kg (207 lb)   LMP 06/27/2007   BMI 34.45 kg/m     alert and no distress  PSYCH: Alert and oriented times 3; coherent speech, normal   rate and " volume, able to articulate logical thoughts, able   to abstract reason, no tangential thoughts, no hallucinations   or delusions  Her affect is normal  RESP: No cough, no audible wheezing, able to talk in full sentences  Remainder of exam unable to be completed due to telephone visits    Labs reviewed in Epic        Assessment/Plan:  1. Anal fissure  Repeat course of nifedipine topically x 1 mo   - nifedipine 0.2% in white petrolatum 0.2 % OINT ointment; Place rectally every 6 hours  Dispense: 45 g; Refill: 1  - Lidocaine 2 % GEL; Place 1 Dose rectally every 4 hours as needed (anal pain from fissure)  Dispense: 30 g; Refill: 0    (E78.5) Hyperlipidemia LDL goal <130  (primary encounter diagnosis)  Plan: labs fall . Cont statin    (E03.4) Hypothyroidism due to acquired atrophy of thyroid  Plan: labs in fall. Cont meds     Return in about 6 months (around 10/21/2020) for Wellness Visit with labs before.      Phone call duration:  14 minutes    Krish Shannon MD

## 2020-04-22 ENCOUNTER — MYC REFILL (OUTPATIENT)
Dept: INTERNAL MEDICINE | Facility: CLINIC | Age: 60
End: 2020-04-22

## 2020-04-22 DIAGNOSIS — K60.2 ANAL FISSURE: ICD-10-CM

## 2020-04-23 DIAGNOSIS — K60.2 ANAL FISSURE: ICD-10-CM

## 2020-04-23 DIAGNOSIS — I10 ESSENTIAL HYPERTENSION, BENIGN: ICD-10-CM

## 2020-04-23 RX ORDER — SPIRONOLACTONE AND HYDROCHLOROTHIAZIDE 25; 25 MG/1; MG/1
TABLET ORAL
Qty: 90 TABLET | Refills: 3 | Status: SHIPPED | OUTPATIENT
Start: 2020-04-23 | End: 2021-03-02

## 2020-04-23 NOTE — TELEPHONE ENCOUNTER
Drug not on the [facility] refill protocol   Patient care team, please locate and refax local print Rx or have [facility] nurse call with verbal approval.

## 2020-04-27 ENCOUNTER — TELEPHONE (OUTPATIENT)
Dept: INTERNAL MEDICINE | Facility: CLINIC | Age: 60
End: 2020-04-27

## 2020-04-27 NOTE — TELEPHONE ENCOUNTER
Reason for Call:  Medication or medication refill:    Do you use a [facility]?  Name of the pharmacy and phone number for the current request:  [facility]    Name of the medication requested: nifedipine    Other request: pt has been waiting since 4-21 for this refill.  She had a phone visit with Dr Shannon.  Looks like it didn't go through for some reason.  Needs today.       Can we leave a detailed message on this number? YES    Phone number patient can be reached at: Cell number on file:    Telephone Information:   Mobile [phone]       Best Time: today    Call taken on 4/27/2020 at 3:50 PM by COLE JONES

## 2020-04-27 NOTE — TELEPHONE ENCOUNTER
RN called [facility].    Verbal given as relayed with original order.     Called back to pt and relayed info.  No further action needed at this time.

## 2020-04-29 DIAGNOSIS — E03.4 HYPOTHYROIDISM DUE TO ACQUIRED ATROPHY OF THYROID: ICD-10-CM

## 2020-04-29 RX ORDER — LEVOTHYROXINE SODIUM 137 UG/1
137 TABLET ORAL DAILY
Qty: 90 TABLET | Refills: 3 | Status: SHIPPED | OUTPATIENT
Start: 2020-04-29 | End: 2021-03-02

## 2020-06-01 ENCOUNTER — THERAPY VISIT (OUTPATIENT)
Dept: CHIROPRACTIC MEDICINE | Facility: CLINIC | Age: 60
End: 2020-06-01
Payer: COMMERCIAL

## 2020-06-01 DIAGNOSIS — M99.04 SOMATIC DYSFUNCTION OF SACRAL REGION: ICD-10-CM

## 2020-06-01 DIAGNOSIS — G89.29 CHRONIC BILATERAL LOW BACK PAIN WITHOUT SCIATICA: ICD-10-CM

## 2020-06-01 DIAGNOSIS — M99.02 THORACIC SEGMENT DYSFUNCTION: ICD-10-CM

## 2020-06-01 DIAGNOSIS — M99.03 SOMATIC DYSFUNCTION OF LUMBAR REGION: Primary | ICD-10-CM

## 2020-06-01 DIAGNOSIS — M54.50 CHRONIC BILATERAL LOW BACK PAIN WITHOUT SCIATICA: ICD-10-CM

## 2020-06-01 DIAGNOSIS — M99.05 SOMATIC DYSFUNCTION OF PELVIS REGION: ICD-10-CM

## 2020-06-01 DIAGNOSIS — M53.3 PAIN IN THE COCCYX: ICD-10-CM

## 2020-06-01 PROCEDURE — 98941 CHIROPRACT MANJ 3-4 REGIONS: CPT | Mod: AT | Performed by: CHIROPRACTOR

## 2020-06-01 PROCEDURE — 97810 ACUP 1/> WO ESTIM 1ST 15 MIN: CPT | Mod: GA | Performed by: CHIROPRACTOR

## 2020-06-02 NOTE — PROGRESS NOTES
Visit #: 4/2020  CHRONIC PAIN    Subjective:  Cherry Kim is a 58 year old female who is seen in f/u up for:        Somatic dysfunction of lumbar region  Chronic bilateral low back pain without sciatica  Somatic dysfunction of pelvis region  Pain in the coccyx  Somatic dysfunction of sacral region  Thoracic segment dysfunction.     Since last visit ,   Cherry Kim reports:    Area of chief complaint:  Lumbar :  Symptoms are graded at 5/10. The quality is described as stiff, achey, dull.  Motion has increased, but is still not normal. The pt reports at least 50% subjective improvement since initial presentation. Due to COVID and reduced treatment she noted an increase in pelvic sx. She will be returning to the pelvic therapist soon.She states that ACP stabilized her sx.  Sitting will increase the pain. The pt denies weakness or other unusual sx.     Since last visit the patient feels that they are overall  50  percent  improved from last visit-exacerbation/regression-however stable      Objective:  The following was observed:    P: palpatory tenderness Gluteal, Lumbar erector spine and Quad lumb Bilaterally  A: static palpation demonstrates intersegmental asymmetry   R: motion palpation notes restricted motion  T: hypertonicity at: Gluteal, Lumbar erector spine and Quad lumb Bilaterally    Segmental spinal dysfunction/restrictions found at:  T6 , T9 , L5 , Sacrum  and PSIS Right       Assessment:    Diagnoses:      1. Somatic dysfunction of lumbar region    2. Chronic bilateral low back pain without sciatica    3. Somatic dysfunction of pelvis region    4. Pain in the coccyx    5. Somatic dysfunction of sacral region    6. Thoracic segment dysfunction        Patient's condition:  Exacerbation/regression     Treatment effectiveness:  Post manipulation there is better intersegmental movement and Patient claims to feel looser post manipulation      Procedures:  CMT:  15073 Chiropractic  manipulative treatment 3-4  regions performed   Thoracic: Diversified, T4, T8, Prone  Pelvis: Drop Table, Sacrum , PSIS Right , Prone   Anterior B pubis: drop table on R pubis     Modalities:  68694: Acupuncture, for 15 minutes:  Points: Huatuoanthonyaji Points in lumbar and sacral spine  Ahsi point in hips and legs  Points in the coccyx.     For 30 minutes    Therapeutic procedures:  None     Response to Treatment  Reduction in symptoms as reported by patient    Prognosis: Good    Progress towards Goals: Patient is making progress towards the goal.  Goals:  SLEEPING: the patient specific goal is to attain his pre-injury status of 6-7 hours comfortably  SITTING: the patient specific goal is to attain pre-injury status of  8 hours comfortably         Recommendations:    Instructions:  continue to use the lumbar support     Follow-up:    Return to care in 3-4  weeks with ACP.

## 2020-06-22 ENCOUNTER — THERAPY VISIT (OUTPATIENT)
Dept: CHIROPRACTIC MEDICINE | Facility: CLINIC | Age: 60
End: 2020-06-22
Payer: COMMERCIAL

## 2020-06-22 DIAGNOSIS — M99.05 SOMATIC DYSFUNCTION OF PELVIS REGION: ICD-10-CM

## 2020-06-22 DIAGNOSIS — I10 ESSENTIAL HYPERTENSION, BENIGN: ICD-10-CM

## 2020-06-22 DIAGNOSIS — M54.50 CHRONIC BILATERAL LOW BACK PAIN WITHOUT SCIATICA: ICD-10-CM

## 2020-06-22 DIAGNOSIS — M99.02 THORACIC SEGMENT DYSFUNCTION: ICD-10-CM

## 2020-06-22 DIAGNOSIS — M99.03 SOMATIC DYSFUNCTION OF LUMBAR REGION: Primary | ICD-10-CM

## 2020-06-22 DIAGNOSIS — G89.29 CHRONIC BILATERAL LOW BACK PAIN WITHOUT SCIATICA: ICD-10-CM

## 2020-06-22 DIAGNOSIS — M53.3 PAIN IN THE COCCYX: ICD-10-CM

## 2020-06-22 DIAGNOSIS — M99.04 SOMATIC DYSFUNCTION OF SACRAL REGION: ICD-10-CM

## 2020-06-22 PROCEDURE — 97810 ACUP 1/> WO ESTIM 1ST 15 MIN: CPT | Mod: GA | Performed by: CHIROPRACTOR

## 2020-06-22 PROCEDURE — 98941 CHIROPRACT MANJ 3-4 REGIONS: CPT | Mod: AT | Performed by: CHIROPRACTOR

## 2020-06-23 RX ORDER — METOPROLOL TARTRATE 50 MG
TABLET ORAL
Qty: 180 TABLET | Refills: 2 | Status: SHIPPED | OUTPATIENT
Start: 2020-06-23 | End: 2021-03-02

## 2020-06-23 NOTE — PROGRESS NOTES
Visit #: 5/2020  CHRONIC PAIN    Subjective:  Cherry Kim is a 59 year old female who is seen in f/u up for:        Somatic dysfunction of lumbar region  Chronic bilateral low back pain without sciatica  Somatic dysfunction of pelvis region  Pain in the coccyx  Somatic dysfunction of sacral region  Thoracic segment dysfunction.     Since last visit ,   Cherry Kim reports:    Area of chief complaint:  Lumbar :  Symptoms are graded at 6/10. The quality is described as stiff, achey, dull.  Motion has increased, but is still not normal. The pt reports less than  50% subjective improvement since initial presentation. She states she has been sore the past 2 days. She relates the pain to stress and sitting. She is having issues with her bowels and elimination. She notes some radiation of pain in the anterior pelvis. The pt notes pain across the low back area.  The pt denies weakness or other unusual sx.     Since last visit the patient feels that they are overall  50  percent  improved from last visit-exacerbation/regression-however stable      Objective:  The following was observed:    P: palpatory tenderness Gluteal, Lumbar erector spine and Quad lumb Bilaterally  A: static palpation demonstrates intersegmental asymmetry   R: motion palpation notes restricted motion  T: hypertonicity at: Gluteal, Lumbar erector spine and Quad lumb Bilaterally    Segmental spinal dysfunction/restrictions found at:  T5 , T10 , L5 , Sacrum  and PSIS Left        Assessment:    Diagnoses:      1. Somatic dysfunction of lumbar region    2. Chronic bilateral low back pain without sciatica    3. Somatic dysfunction of pelvis region    4. Pain in the coccyx    5. Somatic dysfunction of sacral region    6. Thoracic segment dysfunction        Patient's condition:  Exacerbation/regression     Treatment effectiveness:  Post manipulation there is better intersegmental movement and Patient claims to feel looser post  manipulation      Procedures:  CMT:  41747 Chiropractic manipulative treatment 3-4  regions performed   Thoracic: Diversified, T5, T10, Prone  Pelvis: Drop Table, Sacrum , PSIS Left , Prone   Anterior B pubis: drop table on R pubis     Modalities:  51938: Acupuncture, for 15 minutes:  Points: Ilda Points in lumbar and sacral spine  Ahsi point in hips and legs  Points in the coccyx.     For 30 minutes    Therapeutic procedures:  None     Response to Treatment  Reduction in symptoms as reported by patient    Prognosis: Good    Progress towards Goals: Patient is making progress towards the goal.  Goals:  SLEEPING: the patient specific goal is to attain his pre-injury status of 6-7 hours comfortably  SITTING: the patient specific goal is to attain pre-injury status of  8 hours comfortably         Recommendations:    Instructions:  continue to use the lumbar support     Follow-up:    Return to care in 3-4  weeks with ACP.

## 2020-07-03 NOTE — PROGRESS NOTES
Discharge Note    Progress reporting period is from initial evaluation date (please see noted date below) to Dec 23, 2019.  Linked Episodes   Type: Episode: Status: Noted: Resolved: Last update: Updated by:   CHIROPRACTIC Chronic pelvic pain/tailbone pain Active 4/8/2019 6/23/2020  6:56 PM Sherwin Garcia DC      Comments:       Cherry failed to follow up and current status is unknown.  Please see information below for last relevant information on current status.  Patient seen for 5 visits.    SUBJECTIVE  Subjective changes noted by patient:  Pt reports that she is doing well. She had a couple of good days following last session. She did more lifting today, which does cause increased soreness in glute and pelvic area.Pt unsure if she is completing exercises correctly.   .  Current pain level is  .     Previous pain level was   .   Changes in function:  Yes (See Goal flowsheet attached for changes in current functional level)  Adverse reaction to treatment or activity: None    OBJECTIVE  Changes noted in objective findings: Pt ed on the importance of progress.  Cont manual work and reviewed core exercises to ensure proper mechanics. Good contract/relax of PF noted.      ASSESSMENT/PLAN  Diagnosis: Pelvic pain    Updated problem list and treatment plan:   Pain - HEP  Impaired muscle performance - HEP  STG/LTGs have been met or progress has been made towards goals:  Yes, please see goal flowsheet for most current information  Assessment of Progress: current status is unknown.    Last current status: Pt is progressing as expected   Self Management Plans:  HEP  I have re-evaluated this patient and find that the nature, scope, duration and intensity of the therapy is appropriate for the medical condition of the patient.  Cherry continues to require the following intervention to meet STG and LTG's:  HEP.    Recommendations:  Discharge with current home program.  Patient to follow up with MD as needed.    Please refer to  the daily flowsheet for treatment today, total treatment time and time spent performing 1:1 timed codes.

## 2020-07-20 ENCOUNTER — THERAPY VISIT (OUTPATIENT)
Dept: CHIROPRACTIC MEDICINE | Facility: CLINIC | Age: 60
End: 2020-07-20
Payer: COMMERCIAL

## 2020-07-20 DIAGNOSIS — M99.05 SOMATIC DYSFUNCTION OF PELVIS REGION: ICD-10-CM

## 2020-07-20 DIAGNOSIS — M99.02 THORACIC SEGMENT DYSFUNCTION: ICD-10-CM

## 2020-07-20 DIAGNOSIS — M99.03 SOMATIC DYSFUNCTION OF LUMBAR REGION: Primary | ICD-10-CM

## 2020-07-20 DIAGNOSIS — G89.29 CHRONIC BILATERAL LOW BACK PAIN WITHOUT SCIATICA: ICD-10-CM

## 2020-07-20 DIAGNOSIS — M53.3 PAIN IN THE COCCYX: ICD-10-CM

## 2020-07-20 DIAGNOSIS — M54.50 CHRONIC BILATERAL LOW BACK PAIN WITHOUT SCIATICA: ICD-10-CM

## 2020-07-20 DIAGNOSIS — M99.04 SOMATIC DYSFUNCTION OF SACRAL REGION: ICD-10-CM

## 2020-07-20 PROCEDURE — 97810 ACUP 1/> WO ESTIM 1ST 15 MIN: CPT | Mod: GA | Performed by: CHIROPRACTOR

## 2020-07-20 PROCEDURE — 98941 CHIROPRACT MANJ 3-4 REGIONS: CPT | Mod: AT | Performed by: CHIROPRACTOR

## 2020-07-21 ENCOUNTER — TRANSFERRED RECORDS (OUTPATIENT)
Dept: HEALTH INFORMATION MANAGEMENT | Facility: CLINIC | Age: 60
End: 2020-07-21

## 2020-07-21 LAB
ALT SERPL-CCNC: 36 IU/L (ref 5–35)
AST SERPL-CCNC: 29 U/L (ref 5–34)
CREAT SERPL-MCNC: 0.8 MG/DL (ref 0.5–1.3)

## 2020-07-21 NOTE — PROGRESS NOTES
Visit #: 6/2020  CHRONIC PAIN    Subjective:  Cherry Kim is a 59 year old female who is seen in f/u up for:        Somatic dysfunction of lumbar region  Chronic bilateral low back pain without sciatica  Somatic dysfunction of pelvis region  Somatic dysfunction of sacral region  Thoracic segment dysfunction  Pain in the coccyx.     Since last visit ,   Cherry Kim reports:    Area of chief complaint:  Lumbar :  Symptoms are graded at 6/10. The quality is described as stiff, achey, dull.  Motion has increased, but is still not normal. The pt reports  55% subjective improvement since last treatment.  She states she has returned to her office and is sitting on a better chair than she had during COVID. The pt notes less overall sx. She feels some L coccyx pain and sacral pain intermittently.  The pt denies weakness or other unusual sx.     Since last visit the patient feels that they are overall  55 percent  improved from last visit-slow improvement    Objective:  The following was observed:    P: palpatory tenderness Gluteal, Lumbar erector spine and Quad lumb Bilaterally  A: static palpation demonstrates intersegmental asymmetry   R: motion palpation notes restricted motion  T: hypertonicity at: Gluteal, Lumbar erector spine and Quad lumb Bilaterally    Segmental spinal dysfunction/restrictions found at:  T5 , T10 , L5 , Sacrum  and PSIS Left        Assessment:    Diagnoses:      1. Somatic dysfunction of lumbar region    2. Chronic bilateral low back pain without sciatica    3. Somatic dysfunction of pelvis region    4. Somatic dysfunction of sacral region    5. Thoracic segment dysfunction    6. Pain in the coccyx        Patient's condition:  Exacerbation/regression     Treatment effectiveness:  Post manipulation there is better intersegmental movement and Patient claims to feel looser post manipulation      Procedures:  CMT:  14016 Chiropractic manipulative treatment 3-4  regions  performed   Thoracic: Diversified, T5, T10, Prone  Pelvis: Drop Table, Sacrum , PSIS Left , Prone   Anterior B pubis: drop table on R pubis     Modalities:  58856: Acupuncture, for 15 minutes:  Points: Huatuojoaji Points in lumbar and sacral spine  Ahsi point in hips and legs  Points in the coccyx.     For 30 minutes    Therapeutic procedures:  None     Response to Treatment  Reduction in symptoms as reported by patient    Prognosis: Good    Progress towards Goals: Patient is making progress towards the goal.  Goals:  SLEEPING: the patient specific goal is to attain his pre-injury status of 6-7 hours comfortably  SITTING: the patient specific goal is to attain pre-injury status of  8 hours comfortably         Recommendations:    Instructions:  continue to use the lumbar support     Follow-up:    Return to care in 3-4  weeks with ACP.

## 2020-08-20 ENCOUNTER — THERAPY VISIT (OUTPATIENT)
Dept: CHIROPRACTIC MEDICINE | Facility: CLINIC | Age: 60
End: 2020-08-20
Payer: COMMERCIAL

## 2020-08-20 DIAGNOSIS — M99.02 THORACIC SEGMENT DYSFUNCTION: ICD-10-CM

## 2020-08-20 DIAGNOSIS — M54.50 CHRONIC BILATERAL LOW BACK PAIN WITHOUT SCIATICA: ICD-10-CM

## 2020-08-20 DIAGNOSIS — M99.03 SOMATIC DYSFUNCTION OF LUMBAR REGION: Primary | ICD-10-CM

## 2020-08-20 DIAGNOSIS — G89.29 CHRONIC BILATERAL LOW BACK PAIN WITHOUT SCIATICA: ICD-10-CM

## 2020-08-20 DIAGNOSIS — M99.05 SOMATIC DYSFUNCTION OF PELVIS REGION: ICD-10-CM

## 2020-08-20 DIAGNOSIS — M99.04 SOMATIC DYSFUNCTION OF SACRAL REGION: ICD-10-CM

## 2020-08-20 DIAGNOSIS — M53.3 PAIN IN THE COCCYX: ICD-10-CM

## 2020-08-20 PROCEDURE — 97810 ACUP 1/> WO ESTIM 1ST 15 MIN: CPT | Mod: GA | Performed by: CHIROPRACTOR

## 2020-08-20 PROCEDURE — 98941 CHIROPRACT MANJ 3-4 REGIONS: CPT | Mod: AT | Performed by: CHIROPRACTOR

## 2020-08-20 NOTE — PROGRESS NOTES
Visit #: 7/2020  CHRONIC PAIN    Subjective:  Cherry Kim is a 59 year old female who is seen in f/u up for:        Somatic dysfunction of lumbar region  Chronic bilateral low back pain without sciatica  Somatic dysfunction of pelvis region  Somatic dysfunction of sacral region  Pain in the coccyx  Thoracic segment dysfunction.     Since last visit ,   Cherry Kim reports:    Area of chief complaint:  Lumbar :  Symptoms are graded at 6/10. The quality is described as stiff, achey, dull.  Motion has increased, but is still not normal. The pt reports  55-60% subjective improvement since last treatment.  She states she has returned to her office and for th most part is much more stable. She states she was at the  and was sitting an an awkward position and an uncomfortable chair. She noted an increase in low back and coccyx pain. The pt denies weakness or other unusual sz.  The pt denies weakness or other unusual sx.     Since last visit the patient feels that they are overall  55-60 percent  improved from last visit-slow improvement    Objective:  The following was observed:    P: palpatory tenderness Gluteal, Lumbar erector spine and Quad lumb Bilaterally  A: static palpation demonstrates intersegmental asymmetry   R: motion palpation notes restricted motion  T: hypertonicity at: Gluteal, Lumbar erector spine and Quad lumb Bilaterally    Segmental spinal dysfunction/restrictions found at:  T5 , T10 , L5 , Sacrum  and PSIS Left        Assessment:    Diagnoses:      1. Somatic dysfunction of lumbar region    2. Chronic bilateral low back pain without sciatica    3. Somatic dysfunction of pelvis region    4. Somatic dysfunction of sacral region    5. Pain in the coccyx    6. Thoracic segment dysfunction        Patient's condition:  Pt stable, slight exacerbation     Treatment effectiveness:  Post manipulation there is better intersegmental movement and Patient claims to feel looser  post manipulation      Procedures:  CMT:  15282 Chiropractic manipulative treatment 3-4  regions performed   Thoracic: Diversified, T7,  T11, Prone  Pelvis: Drop Table, Sacrum , PSIS Left , Prone   Anterior B pubis: drop table on R pubis     Modalities:  65107: Acupuncture, for 25 minutes:  Points: Ilda Points in lumbar and sacral spine  Ahsi point in hips and legs  Points in the coccyx.     For 30 minutes    Therapeutic procedures:  None     Response to Treatment  Reduction in symptoms as reported by patient    Prognosis: Good    Progress towards Goals: Patient is making progress towards the goal.  Goals:  SLEEPING: the patient specific goal is to attain his pre-injury status of 6-7 hours comfortably  SITTING: the patient specific goal is to attain pre-injury status of  8 hours comfortably         Recommendations:    Instructions:  continue to use the lumbar support     Follow-up:    Return to care in 3-4  weeks with ACP.

## 2020-08-31 ENCOUNTER — THERAPY VISIT (OUTPATIENT)
Dept: CHIROPRACTIC MEDICINE | Facility: CLINIC | Age: 60
End: 2020-08-31
Payer: COMMERCIAL

## 2020-08-31 DIAGNOSIS — M53.3 PAIN IN THE COCCYX: ICD-10-CM

## 2020-08-31 DIAGNOSIS — M54.50 CHRONIC BILATERAL LOW BACK PAIN WITHOUT SCIATICA: ICD-10-CM

## 2020-08-31 DIAGNOSIS — M99.05 SOMATIC DYSFUNCTION OF PELVIS REGION: ICD-10-CM

## 2020-08-31 DIAGNOSIS — M99.03 SOMATIC DYSFUNCTION OF LUMBAR REGION: Primary | ICD-10-CM

## 2020-08-31 DIAGNOSIS — G89.29 CHRONIC BILATERAL LOW BACK PAIN WITHOUT SCIATICA: ICD-10-CM

## 2020-08-31 DIAGNOSIS — M99.02 THORACIC SEGMENT DYSFUNCTION: ICD-10-CM

## 2020-08-31 DIAGNOSIS — M99.04 SOMATIC DYSFUNCTION OF SACRAL REGION: ICD-10-CM

## 2020-08-31 PROCEDURE — 97110 THERAPEUTIC EXERCISES: CPT | Performed by: CHIROPRACTOR

## 2020-08-31 PROCEDURE — 97810 ACUP 1/> WO ESTIM 1ST 15 MIN: CPT | Mod: GA | Performed by: CHIROPRACTOR

## 2020-08-31 PROCEDURE — 98941 CHIROPRACT MANJ 3-4 REGIONS: CPT | Mod: AT | Performed by: CHIROPRACTOR

## 2020-08-31 NOTE — PROGRESS NOTES
Visit #: 8/2020  CHRONIC PAIN    Subjective:  Cherry Kim is a 59 year old female who is seen in f/u up for:        Somatic dysfunction of lumbar region  Chronic bilateral low back pain without sciatica  Somatic dysfunction of pelvis region  Pain in the coccyx  Somatic dysfunction of sacral region  Thoracic segment dysfunction.     Since last visit ,   Cherry Kim reports:    Area of chief complaint:  Lumbar :  Symptoms are graded at 6/10. The quality is described as stiff, achey, dull.  Motion has increased, but is still not normal. The pt reports  55-60% subjective improvement since last treatment.  She has not changed since the previous treatment. She notes tail bone pain today with some radiation in the R posterior thigh. She relates the px to extended sitting.  The pt denies weakness or other unusual sx.     Since last visit the patient feels that they are overall  55-60 percent  improved from last visit-no change     Objective:  The following was observed:    P: palpatory tenderness Gluteal, Lumbar erector spine and Quad lumb Bilaterally  A: static palpation demonstrates intersegmental asymmetry   R: motion palpation notes restricted motion  T: hypertonicity at: Gluteal, Lumbar erector spine and Quad lumb Bilaterally    Segmental spinal dysfunction/restrictions found at:  T7 , T10 , L5 , Sacrum  and PSIS Left        Assessment:    Diagnoses:      1. Somatic dysfunction of lumbar region    2. Chronic bilateral low back pain without sciatica    3. Somatic dysfunction of pelvis region    4. Pain in the coccyx    5. Somatic dysfunction of sacral region    6. Thoracic segment dysfunction        Patient's condition:  Pt stable-no change , slight exacerbation     Treatment effectiveness:  Post manipulation there is better intersegmental movement and Patient claims to feel looser post manipulation      Procedures:  CMT:  81384 Chiropractic manipulative treatment 3-4  regions performed    Thoracic: Diversified, T7,  T10, Prone   Lumbar: L5 diversified, side posture   Pelvis: Drop Table, Sacrum , PSIS Left , Prone   Anterior B pubis: drop table on R pubis     Modalities:  28275: Acupuncture, for 25 minutes:  Points: Huatuoanthonyaji Points in lumbar and sacral spine  Ahsi point in hips and legs  Points in the coccyx.     For 20 minutes    Therapeutic procedures:  Gave hamstring stretch supine with use of a belt or towel for maximum stretch. Gave standing hamstring stretch and nerve traction as per stretch protocol with demonstration.   Gave the stretch strap exercise for hamstring stretch with demonstration  Gave supine adductor stretch with demonstration supine-knee to armpit  Gave '10 minute squat test' with demonstration with discourse on youtube with demonstration  Per 9 minutes    Response to Treatment  Reduction in symptoms as reported by patient    Prognosis: Good    Progress towards Goals: Patient is making progress towards the goal.  Goals:  SLEEPING: the patient specific goal is to attain his pre-injury status of 6-7 hours comfortably  SITTING: the patient specific goal is to attain pre-injury status of  8 hours comfortably         Recommendations:    Instructions:  continue to use the lumbar support     Follow-up:    Return to care in 3-4  weeks with ACP.

## 2020-09-22 ENCOUNTER — THERAPY VISIT (OUTPATIENT)
Dept: CHIROPRACTIC MEDICINE | Facility: CLINIC | Age: 60
End: 2020-09-22
Payer: COMMERCIAL

## 2020-09-22 DIAGNOSIS — M99.05 SOMATIC DYSFUNCTION OF PELVIS REGION: ICD-10-CM

## 2020-09-22 DIAGNOSIS — G89.29 CHRONIC BILATERAL LOW BACK PAIN WITHOUT SCIATICA: ICD-10-CM

## 2020-09-22 DIAGNOSIS — M54.50 CHRONIC BILATERAL LOW BACK PAIN WITHOUT SCIATICA: ICD-10-CM

## 2020-09-22 DIAGNOSIS — M53.3 PAIN IN THE COCCYX: ICD-10-CM

## 2020-09-22 DIAGNOSIS — M99.02 THORACIC SEGMENT DYSFUNCTION: ICD-10-CM

## 2020-09-22 DIAGNOSIS — M99.03 SOMATIC DYSFUNCTION OF LUMBAR REGION: Primary | ICD-10-CM

## 2020-09-22 DIAGNOSIS — M99.04 SOMATIC DYSFUNCTION OF SACRAL REGION: ICD-10-CM

## 2020-09-22 PROCEDURE — 97810 ACUP 1/> WO ESTIM 1ST 15 MIN: CPT | Mod: GA | Performed by: CHIROPRACTOR

## 2020-09-22 PROCEDURE — 98941 CHIROPRACT MANJ 3-4 REGIONS: CPT | Mod: AT | Performed by: CHIROPRACTOR

## 2020-09-22 NOTE — PROGRESS NOTES
Visit #: 9/2020  CHRONIC PAIN    Subjective:  Cherry Kim is a 59 year old female who is seen in f/u up for:        Somatic dysfunction of lumbar region  Chronic bilateral low back pain without sciatica  Somatic dysfunction of pelvis region  Somatic dysfunction of sacral region  Thoracic segment dysfunction  Pain in the coccyx.     Since last visit ,   Cherry Kim reports:    Area of chief complaint:  Lumbar :  Symptoms are graded at 6/10. The quality is described as stiff, achey, dull.  Motion has increased, but is still not normal. The pt reports  55-60% subjective improvement since last treatment.  She reports increased pain in the coccyx area and sacrum from traveling over the past week. The pt reports having an other abscess in the rectal area that may be increasing her pain levels. The abcess was drained and she was administered antibiotics.  She continues to have difficulty sitting for extended periods. She denies radiation of pain in the extremities.     Since last visit the patient feels that they are overall  55-60 percent  improved from last visit slight exacerbation however stable     Objective:  The following was observed:    P: palpatory tenderness Gluteal, Lumbar erector spine and Quad lumb Bilaterally  A: static palpation demonstrates intersegmental asymmetry   R: motion palpation notes restricted motion  T: hypertonicity at: Gluteal, Lumbar erector spine and Quad lumb Bilaterally    Segmental spinal dysfunction/restrictions found at:  T8 , T11 , L5 , Sacrum  and PSIS Left        Assessment:    Diagnoses:      1. Somatic dysfunction of lumbar region    2. Chronic bilateral low back pain without sciatica    3. Somatic dysfunction of pelvis region    4. Somatic dysfunction of sacral region    5. Thoracic segment dysfunction    6. Pain in the coccyx        Patient's condition:   slight exacerbation due to travelling     Treatment effectiveness:  Post manipulation there is  better intersegmental movement and Patient claims to feel looser post manipulation      Procedures:  CMT:  66412 Chiropractic manipulative treatment 3-4  regions performed   Thoracic: Diversified, T8  T11, Prone   Lumbar: L5 diversified, side posture   Pelvis: Drop Table, Sacrum , PSIS Left , Prone   Anterior B pubis: drop table on R pubis     Modalities:  11283: Acupuncture, for 25 minutes:  Points: Moisesatlanalance Points in lumbar and sacral spine  Ahsi point in hips and legs  Points in the coccyx.     For 20 minutes    Therapeutic procedures:  None     Response to Treatment  Reduction in symptoms as reported by patient    Prognosis: Good    Progress towards Goals: Patient is making progress towards the goal.  Goals:  SLEEPING: the patient specific goal is to attain his pre-injury status of 6-7 hours comfortably  SITTING: the patient specific goal is to attain pre-injury status of  8 hours comfortably         Recommendations:    Instructions:  continue to use the lumbar support     Follow-up:    Return to care in 3-4  weeks with ACP.

## 2020-10-12 ENCOUNTER — THERAPY VISIT (OUTPATIENT)
Dept: CHIROPRACTIC MEDICINE | Facility: CLINIC | Age: 60
End: 2020-10-12
Payer: COMMERCIAL

## 2020-10-12 DIAGNOSIS — M99.03 SOMATIC DYSFUNCTION OF LUMBAR REGION: ICD-10-CM

## 2020-10-12 DIAGNOSIS — M99.05 SOMATIC DYSFUNCTION OF PELVIS REGION: ICD-10-CM

## 2020-10-12 DIAGNOSIS — M53.3 PAIN IN THE COCCYX: ICD-10-CM

## 2020-10-12 DIAGNOSIS — M54.50 CHRONIC BILATERAL LOW BACK PAIN WITHOUT SCIATICA: ICD-10-CM

## 2020-10-12 DIAGNOSIS — M53.3 SACRAL PAIN: ICD-10-CM

## 2020-10-12 DIAGNOSIS — M99.02 THORACIC SEGMENT DYSFUNCTION: ICD-10-CM

## 2020-10-12 DIAGNOSIS — M99.04 SOMATIC DYSFUNCTION OF SACRAL REGION: Primary | ICD-10-CM

## 2020-10-12 DIAGNOSIS — G89.29 CHRONIC BILATERAL LOW BACK PAIN WITHOUT SCIATICA: ICD-10-CM

## 2020-10-12 PROCEDURE — 97810 ACUP 1/> WO ESTIM 1ST 15 MIN: CPT | Mod: GA | Performed by: CHIROPRACTOR

## 2020-10-12 PROCEDURE — 98941 CHIROPRACT MANJ 3-4 REGIONS: CPT | Mod: AT | Performed by: CHIROPRACTOR

## 2020-10-13 NOTE — PROGRESS NOTES
Visit #: 10/2020  CHRONIC PAIN    Subjective:  Cherry Kim is a 59 year old female who is seen in f/u up for:        Somatic dysfunction of sacral region  Sacral pain  Somatic dysfunction of lumbar region  Chronic bilateral low back pain without sciatica  Pain in the coccyx  Somatic dysfunction of pelvis region  Thoracic segment dysfunction.     Since last visit 9/22/20   Cherry Kim reports:    Area of chief complaint:  Lumbar :  Symptoms are graded at 5/10. The quality is described as stiff, achey, dull.  Motion has increased, but is still not normal. The pt reports  50 % subjective improvement since last treatment.  She reports less pain in the coccyx area as the abscess in the rectal area was drained and she is required to having surgery for complete healing.. The pt continues to feel pain in the pelvic area and R SI joint when travelling or seated for extended periods.  She denies radiation of pain in the extremities.     Since last visit the patient feels that they are overall  50 percent  improved from last visit-more stable this visit      Objective:  The following was observed:    P: palpatory tenderness Gluteal, Lumbar erector spine and Quad lumb Bilaterally  A: static palpation demonstrates intersegmental asymmetry   R: motion palpation notes restricted motion  T: hypertonicity at: Gluteal, Lumbar erector spine and Quad lumb Bilaterally    Segmental spinal dysfunction/restrictions found at:  T5 , T9 , L5 , Sacrum  and PSIS Left        Assessment:    Diagnoses:      1. Somatic dysfunction of sacral region    2. Sacral pain    3. Somatic dysfunction of lumbar region    4. Chronic bilateral low back pain without sciatica    5. Pain in the coccyx    6. Somatic dysfunction of pelvis region    7. Thoracic segment dysfunction        Patient's condition:   slight exacerbation due to having an abscess     Treatment effectiveness:  Post manipulation there is better intersegmental  movement and Patient claims to feel looser post manipulation      Procedures:  CMT:  08830 Chiropractic manipulative treatment 3-4  regions performed   Thoracic: Diversified, T5  T9 Prone   Lumbar: L5 diversified, side posture   Pelvis: Drop Table, Sacrum , PSIS Left , Prone   Anterior B pubis: drop table on R pubis     Modalities:  10195: Acupuncture, for 25 minutes:  Points: Huatuojoaji Points in lumbar and sacral spine  Ahsi point in hips and legs  Points in the coccyx.     For 20 minutes    Therapeutic procedures:  None     Response to Treatment  Reduction in symptoms as reported by patient    Prognosis: Good    Progress towards Goals: Patient is making progress towards the goal.  Goals:  SLEEPING: the patient specific goal is to attain his pre-injury status of 6-7 hours comfortably  SITTING: the patient specific goal is to attain pre-injury status of  8 hours comfortably         Recommendations:    Instructions:  continue to use the lumbar support     Follow-up:    Return to care in 3-4  weeks with ACP.

## 2020-10-26 ENCOUNTER — THERAPY VISIT (OUTPATIENT)
Dept: CHIROPRACTIC MEDICINE | Facility: CLINIC | Age: 60
End: 2020-10-26
Payer: COMMERCIAL

## 2020-10-26 DIAGNOSIS — M99.05 SOMATIC DYSFUNCTION OF PELVIS REGION: ICD-10-CM

## 2020-10-26 DIAGNOSIS — M99.03 SOMATIC DYSFUNCTION OF LUMBAR REGION: Primary | ICD-10-CM

## 2020-10-26 DIAGNOSIS — M99.02 THORACIC SEGMENT DYSFUNCTION: ICD-10-CM

## 2020-10-26 DIAGNOSIS — G89.29 CHRONIC BILATERAL LOW BACK PAIN WITHOUT SCIATICA: ICD-10-CM

## 2020-10-26 DIAGNOSIS — M53.3 PAIN IN THE COCCYX: ICD-10-CM

## 2020-10-26 DIAGNOSIS — M54.50 CHRONIC BILATERAL LOW BACK PAIN WITHOUT SCIATICA: ICD-10-CM

## 2020-10-26 DIAGNOSIS — M99.04 SOMATIC DYSFUNCTION OF SACRAL REGION: ICD-10-CM

## 2020-10-26 PROCEDURE — 97810 ACUP 1/> WO ESTIM 1ST 15 MIN: CPT | Mod: GA | Performed by: CHIROPRACTOR

## 2020-10-26 PROCEDURE — 98941 CHIROPRACT MANJ 3-4 REGIONS: CPT | Mod: AT | Performed by: CHIROPRACTOR

## 2020-10-26 NOTE — PROGRESS NOTES
Visit #: 11/2020  CHRONIC PAIN    Subjective:  Cherry Kim is a 59 year old female who is seen in f/u up for:        Somatic dysfunction of lumbar region  Chronic bilateral low back pain without sciatica  Somatic dysfunction of pelvis region  Pain in the coccyx  Somatic dysfunction of sacral region  Thoracic segment dysfunction.     Since last visit 10/12/20   Cherry Kim reports:    Area of chief complaint:  Lumbar :  Symptoms are graded at 5/10. The quality is described as stiff, achey, dull.  Motion has increased, but is still not normal. The pt reports  50 % subjective improvement since last treatment.  She reports a slight increase in sacral and coccyx pain from sitting in a different chair this week. She denies radiation in the extremities. The pt is scheduled for surgery at the end of November for colorectal surgery.  She denies radiation of pain in the extremities.     Since last visit the patient feels that they are overall  50 percent  improved from last visit-more stable however slight exacerbation       Objective:  The following was observed:    P: palpatory tenderness Gluteal, Lumbar erector spine and Quad lumb Bilaterally  A: static palpation demonstrates intersegmental asymmetry   R: motion palpation notes restricted motion  T: hypertonicity at: Gluteal, Lumbar erector spine and Quad lumb Bilaterally    Segmental spinal dysfunction/restrictions found at:  T1, T5 , T8 , L5 , Sacrum  and PSIS Left        Assessment:    Diagnoses:      1. Somatic dysfunction of lumbar region    2. Chronic bilateral low back pain without sciatica    3. Somatic dysfunction of pelvis region    4. Pain in the coccyx    5. Somatic dysfunction of sacral region    6. Thoracic segment dysfunction        Patient's condition:   slight exacerbation due changing work stations throughout the day     Treatment effectiveness:  Post manipulation there is better intersegmental movement and Patient claims to  feel looser post manipulation      Procedures:  CMT:  54052 Chiropractic manipulative treatment 3-4  regions performed   Thoracic: Diversified, T1, T5  T8 Prone   Lumbar: L5 diversified, side posture   Pelvis: Drop Table, Sacrum , PSIS Left , Prone   Anterior B pubis: drop table on R pubis     Modalities:  74589: Acupuncture, for 25 minutes:  Points: Huatuojoaji Points in lumbar and sacral spine  Ahsi point in hips and legs  Points in the coccyx.     For 20 minutes    Therapeutic procedures:  None     Response to Treatment  Reduction in symptoms as reported by patient    Prognosis: Good    Progress towards Goals: Patient is making progress towards the goal.  Goals:  SLEEPING: the patient specific goal is to attain his pre-injury status of 6-7 hours comfortably  SITTING: the patient specific goal is to attain pre-injury status of  8 hours comfortably         Recommendations:    Instructions:  continue to use the lumbar support     Follow-up:    Return to care in 3-4  weeks with ACP.

## 2020-11-13 RX ORDER — LIDOCAINE HYDROCHLORIDE 20 MG/ML
JELLY TOPICAL
Qty: 30 ML | Refills: 0 | OUTPATIENT
Start: 2020-11-13

## 2020-11-13 NOTE — TELEPHONE ENCOUNTER
Lidocaine    Routing refill request to provider for review/approval because:  Drug not on the [facility] refill protocol

## 2020-11-16 ENCOUNTER — HEALTH MAINTENANCE LETTER (OUTPATIENT)
Age: 60
End: 2020-11-16

## 2020-11-17 NOTE — TELEPHONE ENCOUNTER
Pt has appt with colo rectal specialist tomorrow, will discuss need for med with them    Aisha Peguero CMA

## 2020-11-18 ENCOUNTER — MYC REFILL (OUTPATIENT)
Dept: OBGYN | Facility: CLINIC | Age: 60
End: 2020-11-18

## 2020-11-18 ENCOUNTER — TRANSFERRED RECORDS (OUTPATIENT)
Dept: HEALTH INFORMATION MANAGEMENT | Facility: CLINIC | Age: 60
End: 2020-11-18

## 2020-11-18 DIAGNOSIS — B00.9 HSV INFECTION: ICD-10-CM

## 2020-11-18 RX ORDER — VALACYCLOVIR HYDROCHLORIDE 500 MG/1
500 TABLET, FILM COATED ORAL DAILY
Qty: 30 TABLET | Refills: 0 | Status: SHIPPED | OUTPATIENT
Start: 2020-11-18 | End: 2020-12-11

## 2020-11-18 RX ORDER — LIDOCAINE HYDROCHLORIDE 20 MG/ML
JELLY TOPICAL
Qty: 30 ML | Refills: 0 | OUTPATIENT
Start: 2020-11-18

## 2020-11-18 NOTE — TELEPHONE ENCOUNTER
Lidocaine 2% gel    Routing refill request to provider for review/approval because:  Drug not on the [facility] refill protocol     Neelima ALVAREZN, RN, PHN

## 2020-11-18 NOTE — TELEPHONE ENCOUNTER
"Requested Prescriptions   Pending Prescriptions Disp Refills     valACYclovir (VALTREX) 500 MG tablet 90 tablet 3     Sig: Take 1 tablet (500 mg) by mouth daily       Antivirals for Herpes Protocol Failed - 11/18/2020 12:25 PM        Failed - Recent (12 mo) or future (30 days) visit within the authorizing provider's specialty     Patient has had an office visit with the authorizing provider or a provider within the authorizing providers department within the previous 12 mos or has a future within next 30 days. See \"Patient Info\" tab in inbasket, or \"Choose Columns\" in Meds & Orders section of the refill encounter.              Passed - Patient is age 12 or older        Passed - Medication is active on med list        Passed - Normal serum creatinine on file in past 12 months     Recent Labs   Lab Test 07/21/20   CR 0.800       Ok to refill medication if creatinine is low             Medication is being filled for 1 time refill only due to:  appointment needed for further refills Chelsea Butler RN on 11/18/2020 at 12:57 PM      "

## 2020-11-23 ENCOUNTER — THERAPY VISIT (OUTPATIENT)
Dept: CHIROPRACTIC MEDICINE | Facility: CLINIC | Age: 60
End: 2020-11-23
Payer: COMMERCIAL

## 2020-11-23 DIAGNOSIS — M53.3 PAIN IN THE COCCYX: ICD-10-CM

## 2020-11-23 DIAGNOSIS — M99.02 THORACIC SEGMENT DYSFUNCTION: ICD-10-CM

## 2020-11-23 DIAGNOSIS — M99.03 SOMATIC DYSFUNCTION OF LUMBAR REGION: Primary | ICD-10-CM

## 2020-11-23 DIAGNOSIS — M99.04 SOMATIC DYSFUNCTION OF SACRAL REGION: ICD-10-CM

## 2020-11-23 DIAGNOSIS — M99.05 SOMATIC DYSFUNCTION OF PELVIS REGION: ICD-10-CM

## 2020-11-23 DIAGNOSIS — G89.29 CHRONIC BILATERAL LOW BACK PAIN WITHOUT SCIATICA: ICD-10-CM

## 2020-11-23 DIAGNOSIS — M54.50 CHRONIC BILATERAL LOW BACK PAIN WITHOUT SCIATICA: ICD-10-CM

## 2020-11-23 PROCEDURE — 98941 CHIROPRACT MANJ 3-4 REGIONS: CPT | Mod: AT | Performed by: CHIROPRACTOR

## 2020-11-23 PROCEDURE — 97810 ACUP 1/> WO ESTIM 1ST 15 MIN: CPT | Mod: GA | Performed by: CHIROPRACTOR

## 2020-11-25 NOTE — PROGRESS NOTES
Visit #: 11/2020  CHRONIC PAIN    Subjective:  Cherry Kim is a 59 year old female who is seen in f/u up for:        Somatic dysfunction of lumbar region  Chronic bilateral low back pain without sciatica  Somatic dysfunction of pelvis region  Pain in the coccyx  Somatic dysfunction of sacral region  Thoracic segment dysfunction.     Since last visit 10/12/20   Cherry Kim reports:    Area of chief complaint:  Lumbar :  Symptoms are graded at 5/10. The quality is described as stiff, achey, dull.  Motion has increased, but is still not normal. The pt reports  55% improvement since initial presentation. She notes moderate soreness in the coccyx area from sitting for prolonged periods on her dining room chair.  She denies significant pain in the rectal area and she may be having surgery soon. Today she notes tension in the buttock area again due to sitting.  She denies radiation of pain in the extremities.     Since last visit the patient feels that they are overall  55 percent  improved from last visit-slight improvement    Objective:  The following was observed:    P: palpatory tenderness Gluteal, Lumbar erector spine and Quad lumb Bilaterally  A: static palpation demonstrates intersegmental asymmetry   R: motion palpation notes restricted motion  T: hypertonicity at: Gluteal, Lumbar erector spine and Quad lumb Bilaterally    Segmental spinal dysfunction/restrictions found at:  T1, T7 , T10 , L4, Sacrum  and PSIS Left        Assessment:    Diagnoses:      1. Somatic dysfunction of lumbar region    2. Chronic bilateral low back pain without sciatica    3. Somatic dysfunction of pelvis region    4. Pain in the coccyx    5. Somatic dysfunction of sacral region    6. Thoracic segment dysfunction        Patient's condition:  Pt responding well to therapy    Treatment effectiveness:  Post manipulation there is better intersegmental movement and Patient claims to feel looser post  manipulation      Procedures:  CMT:  74998 Chiropractic manipulative treatment 3-4  regions performed   Thoracic: Diversified, T1, T7,  T10 Prone   Lumbar: L4 diversified, side posture   Pelvis: Drop Table, Sacrum , PSIS Left , Prone   Anterior B pubis: drop table on R pubis     Modalities:  47095: Acupuncture, for 25 minutes:  Points: Hu[place]uojoaji Points in lumbar and sacral spine  Ahsi point in hips and legs  Points in the coccyx.     For 20 minutes    Therapeutic procedures:  None     Response to Treatment  Reduction in symptoms as reported by patient    Prognosis: Good    Progress towards Goals: Patient is making progress towards the goal.  Goals:  SLEEPING: the patient specific goal is to attain his pre-injury status of 6-7 hours comfortably  SITTING: the patient specific goal is to attain pre-injury status of  8 hours comfortably         Recommendations:    Instructions:  continue to use the lumbar support     Follow-up:    Return to care in 3-4  weeks with ACP.

## 2020-12-11 DIAGNOSIS — B00.9 HSV INFECTION: ICD-10-CM

## 2020-12-11 RX ORDER — VALACYCLOVIR HYDROCHLORIDE 500 MG/1
TABLET, FILM COATED ORAL
Qty: 30 TABLET | Refills: 0 | Status: SHIPPED | OUTPATIENT
Start: 2020-12-11 | End: 2020-12-22

## 2020-12-11 NOTE — TELEPHONE ENCOUNTER
"Requested Prescriptions   Pending Prescriptions Disp Refills     valACYclovir (VALTREX) 500 MG tablet [Pharmacy Med Name: VALACYCLOVIR  MG TABLET] 30 tablet 0     Sig: TAKE 1 TABLET BY MOUTH EVERY DAY       Antivirals for Herpes Protocol Passed - 12/11/2020 10:30 AM        Passed - Patient is age 12 or older        Passed - Recent (12 mo) or future (30 days) visit within the authorizing provider's specialty     Patient has had an office visit with the authorizing provider or a provider within the authorizing providers department within the previous 12 mos or has a future within next 30 days. See \"Patient Info\" tab in inbasket, or \"Choose Columns\" in Meds & Orders section of the refill encounter.              Passed - Medication is active on med list        Passed - Normal serum creatinine on file in past 12 months     Recent Labs   Lab Test 07/21/20   CR 0.800       Ok to refill medication if creatinine is low             Last Written Prescription Date:  11/18/2020  Last Fill Quantity: 30,  # refills: 0   Last office visit: 9/3/2019 with prescribing provider:  [facility]   Future Office Visit:   Next 5 appointments (look out 90 days)    Dec 22, 2020  4:00 PM  PHYSICAL with Romina Lopes MD  [facility] ([facility] [address] [place] [id number] [phone]         Prescription approved per [facility] Refill Protocol.  Chiquis Jules RN on 12/11/2020 at 12:33 PM          "

## 2020-12-17 ENCOUNTER — THERAPY VISIT (OUTPATIENT)
Dept: CHIROPRACTIC MEDICINE | Facility: CLINIC | Age: 60
End: 2020-12-17
Payer: COMMERCIAL

## 2020-12-17 DIAGNOSIS — M99.04 SOMATIC DYSFUNCTION OF SACRAL REGION: ICD-10-CM

## 2020-12-17 DIAGNOSIS — M53.3 SACRAL PAIN: ICD-10-CM

## 2020-12-17 DIAGNOSIS — M99.02 THORACIC SEGMENT DYSFUNCTION: ICD-10-CM

## 2020-12-17 DIAGNOSIS — M99.03 SOMATIC DYSFUNCTION OF LUMBAR REGION: Primary | ICD-10-CM

## 2020-12-17 DIAGNOSIS — M53.3 PAIN IN THE COCCYX: ICD-10-CM

## 2020-12-17 DIAGNOSIS — M54.50 CHRONIC BILATERAL LOW BACK PAIN WITHOUT SCIATICA: ICD-10-CM

## 2020-12-17 DIAGNOSIS — G89.29 CHRONIC BILATERAL LOW BACK PAIN WITHOUT SCIATICA: ICD-10-CM

## 2020-12-17 DIAGNOSIS — M99.05 SOMATIC DYSFUNCTION OF PELVIS REGION: ICD-10-CM

## 2020-12-17 PROCEDURE — 98941 CHIROPRACT MANJ 3-4 REGIONS: CPT | Mod: AT | Performed by: CHIROPRACTOR

## 2020-12-17 PROCEDURE — 97810 ACUP 1/> WO ESTIM 1ST 15 MIN: CPT | Mod: GA | Performed by: CHIROPRACTOR

## 2020-12-17 NOTE — PROGRESS NOTES
Visit #: 12/2020  CHRONIC PAIN    Subjective:  Cherry Kim is a 59 year old female who is seen in f/u up for:        Somatic dysfunction of lumbar region  Chronic bilateral low back pain without sciatica  Somatic dysfunction of sacral region  Sacral pain  Somatic dysfunction of pelvis region  Pain in the coccyx  Thoracic segment dysfunction.     Since last visit 11/23/20   Cherry Kim reports:    Area of chief complaint:  Lumbar :  Symptoms are graded at 5/10. The quality is described as stiff, achey, dull.  Motion has increased, but is still not normal. The pt reports an increase in pelvic and sacral pain that she relates to sitting for extended periods. She will be having an MRI soon to assess the presence of a fistula. The pt will be having surgery in the new year. Due to the pain she is sitting in an awkward position and it may be affecting the low back area.  She denies radiation of pain in the extremities.     Since last visit the patient feels that they are overall slightly worse from the previous treatment.       Objective:  The following was observed:    P: palpatory tenderness Gluteal, Lumbar erector spine and Quad lumb Bilaterally  A: static palpation demonstrates intersegmental asymmetry   R: motion palpation notes restricted motion  T: hypertonicity at: Gluteal, Lumbar erector spine and Quad lumb Bilaterally    Segmental spinal dysfunction/restrictions found at:  T1, T6, T9 L4, Sacrum  and PSIS Left        Assessment:    Diagnoses:      1. Somatic dysfunction of lumbar region    2. Chronic bilateral low back pain without sciatica    3. Somatic dysfunction of sacral region    4. Sacral pain    5. Somatic dysfunction of pelvis region    6. Pain in the coccyx    7. Thoracic segment dysfunction        Patient's condition:  Exacerbation     Treatment effectiveness:  Post manipulation there is better intersegmental movement and Patient claims to feel looser post  manipulation      Procedures:  CMT:  40884 Chiropractic manipulative treatment 3-4  regions performed   Thoracic: Diversified, T1, T6  T9 Prone   Lumbar: L4 diversified, side posture   Pelvis: Drop Table, Sacrum , PSIS Left , Prone   Anterior B pubis: drop table on R pubis     Modalities:  86406: Acupuncture, for 25 minutes:  Points: Huatuoanthonyaji Points in lumbar and sacral spine  Ahsi point in hips and legs  Points in the coccyx.     For 20 minutes    Therapeutic procedures:  None     Response to Treatment  Reduction in symptoms as reported by patient    Prognosis: Good    Progress towards Goals: Patient is making progress towards the goal.  Goals:  SLEEPING: the patient specific goal is to attain his pre-injury status of 6-7 hours comfortably  SITTING: the patient specific goal is to attain pre-injury status of  8 hours comfortably         Recommendations:    Instructions:  continue to use the lumbar support     Follow-up:    Return to care in 3-4  weeks with ACP.

## 2020-12-22 ENCOUNTER — OFFICE VISIT (OUTPATIENT)
Dept: OBGYN | Facility: CLINIC | Age: 60
End: 2020-12-22
Payer: COMMERCIAL

## 2020-12-22 VITALS
SYSTOLIC BLOOD PRESSURE: 120 MMHG | WEIGHT: 212 LBS | BODY MASS INDEX: 35.32 KG/M2 | DIASTOLIC BLOOD PRESSURE: 76 MMHG | HEIGHT: 65 IN

## 2020-12-22 DIAGNOSIS — Z01.419 ENCOUNTER FOR GYNECOLOGICAL EXAMINATION WITHOUT ABNORMAL FINDING: Primary | ICD-10-CM

## 2020-12-22 DIAGNOSIS — B00.9 HSV INFECTION: ICD-10-CM

## 2020-12-22 PROCEDURE — 99396 PREV VISIT EST AGE 40-64: CPT | Performed by: OBSTETRICS & GYNECOLOGY

## 2020-12-22 RX ORDER — VALACYCLOVIR HYDROCHLORIDE 500 MG/1
500 TABLET, FILM COATED ORAL DAILY
Qty: 90 TABLET | Refills: 3 | Status: SHIPPED | OUTPATIENT
Start: 2020-12-22 | End: 2021-12-22

## 2020-12-22 ASSESSMENT — ANXIETY QUESTIONNAIRES
2. NOT BEING ABLE TO STOP OR CONTROL WORRYING: NOT AT ALL
3. WORRYING TOO MUCH ABOUT DIFFERENT THINGS: NOT AT ALL
GAD7 TOTAL SCORE: 0
6. BECOMING EASILY ANNOYED OR IRRITABLE: NOT AT ALL
7. FEELING AFRAID AS IF SOMETHING AWFUL MIGHT HAPPEN: NOT AT ALL
1. FEELING NERVOUS, ANXIOUS, OR ON EDGE: NOT AT ALL
5. BEING SO RESTLESS THAT IT IS HARD TO SIT STILL: NOT AT ALL
IF YOU CHECKED OFF ANY PROBLEMS ON THIS QUESTIONNAIRE, HOW DIFFICULT HAVE THESE PROBLEMS MADE IT FOR YOU TO DO YOUR WORK, TAKE CARE OF THINGS AT HOME, OR GET ALONG WITH OTHER PEOPLE: NOT DIFFICULT AT ALL

## 2020-12-22 ASSESSMENT — PATIENT HEALTH QUESTIONNAIRE - PHQ9
SUM OF ALL RESPONSES TO PHQ QUESTIONS 1-9: 0
5. POOR APPETITE OR OVEREATING: NOT AT ALL

## 2020-12-22 ASSESSMENT — MIFFLIN-ST. JEOR: SCORE: 1537.51

## 2020-12-22 NOTE — PROGRESS NOTES
Cherry is a 60 year old  female who presents for annual exam.     Besides routine health maintenance, she has no other health concerns today .    HPI:  The patient's PCP is Krish Shannon MD.    Has had recurrent fisutlas  Is working with colorectal and Dr Silveira  Sex is difficult  Refill valtrex for recurrent hsv        GYNECOLOGIC HISTORY:    Patient's last menstrual period was 2007.    Her current contraception method is: menopause.  She  reports that she has never smoked. She has never used smokeless tobacco.    Patient is sexually active.  STD testing offered?  Declined  Last PHQ-9 score on record =   PHQ-9 SCORE 2020   PHQ-9 Total Score 0     Last GAD7 score on record =   GAIL-7 SCORE 2020   Total Score 0     Alcohol Score = 2    HEALTH MAINTENANCE:  Cholesterol:   Cholesterol   Date Value Ref Range Status   2019 166 <200 mg/dL Final   2018 157 <200 mg/dL Final    Last Mammo: , Result: Normal, Next Mammo: 2021  Pap:   Lab Results   Component Value Date    PAP NIL 2018    PAP NIL 01/15/2015    PAP NIL 2011 WNL HPV (-)neg  Colonoscopy:  2019, Result: Normal, Next Colonoscopy: 9 more years.  Dexa:  18    Health maintenance updated:  yes    HISTORY:  OB History    Para Term  AB Living   0 0 0 0 0 0   SAB TAB Ectopic Multiple Live Births   0 0 0 0 0       Patient Active Problem List   Diagnosis     Hypothyroidism     Proteinuria     Essential hypertension, benign     Family history of malignant neoplasm of breast     Crustacean allergy     Obesity     Other specified disorder of rectum and anus     Hemorrhoids     Health Care Home     Connective tissue disorder (H)     Impaired fasting glucose     Hyperlipidemia LDL goal <130     Hallux abducto valgus     Advanced directives, counseling/discussion     Somatic dysfunction of pelvis region     Pain in the coccyx     Somatic dysfunction of lumbar region     Lumbago     Somatic  dysfunction of sacral region     Thoracic segment dysfunction     Past Surgical History:   Procedure Laterality Date     CRYOTHERAPY, CERVICAL           HC RECONSTR NOSE+BULL SEPTAL REPAIR            [facility] NONSPECIFIC PROCEDURE      Tosillectomy     [facility] NONSPECIFIC PROCEDURE  1997    rectal abcess      Social History     Tobacco Use     Smoking status: Never Smoker     Smokeless tobacco: Never Used   Substance Use Topics     Alcohol use: Yes     Comment: 2-3 times per week      Problem (# of Occurrences) Relation (Name,Age of Onset)    Breast Cancer (1) Mother: At 55 yrs    C.A.D. (1) Mother:  of CHF,  at 58yrs of ? MI    Cancer (6) Father: Lung Cancer, Brother: brain cancer  , Maternal Aunt: vaginal, Maternal Aunt: cervical, Maternal Aunt: Rectal cancer, Maternal Grandfather: Stomach cancer in his 40s    Connective Tissue Disorder (1) Mother: Lupus    Diabetes (1) Mother: Mid forties    Hypertension (1) Mother    LUNG DISEASE (1) Brother: COPD- lung transplant    Mitral valve prolapse (1) Sister: possible    No Known Problems (4) Brother, Maternal Grandmother, Paternal Grandmother, Other    Respiratory (1) Father: d. 79 from complications of pneumonia            Current Outpatient Medications   Medication Sig     CO Q-10 100 MG OR CAPS 1 daily     EPINEPHrine (EPIPEN/ADRENACLICK/OR ANY BX GENERIC EQUIV) 0.3 MG/0.3ML injection 2-pack Inject 0.3 mLs (0.3 mg) into the muscle as needed for anaphylaxis     HYDROXYCHLOROQUINE SULFATE 200 MG PO TABS 1 TABLET DAILY     Iodoquinol-HC (HYDROCORTISONE-IODOQUINOL) 1-1 % CREA Externally apply  topically daily as needed.     levothyroxine (SYNTHROID/LEVOTHROID) 137 MCG tablet Take 1 tablet (137 mcg) by mouth daily     Lidocaine 2 % GEL Place 1 Dose rectally every 4 hours as needed (anal pain from fissure)     metoprolol tartrate (LOPRESSOR) 50 MG tablet TAKE 1 TABLET BY MOUTH TWICE A DAY     naproxen (NAPROSYN) 500 MG tablet TK 1 OR 2 TS PO QD PRN      "nifedipine 0.2% in white petrolatum 0.2 % OINT ointment Place rectally every 6 hours     simvastatin (ZOCOR) 40 MG tablet TAKE 1 TABLET BY MOUTH EVERYDAY AT BEDTIME     spironolactone-HCTZ (ALDACTAZIDE) 25-25 MG tablet TAKE 1 TABLET BY MOUTH EVERY DAY     Vaginal Lubricant (REPLENS) GEL Place vaginally as needed     valACYclovir (VALTREX) 500 MG tablet TAKE 1 TABLET BY MOUTH EVERY DAY     No current facility-administered medications for this visit.      Allergies   Allergen Reactions     Lisinopril Anaphylaxis     Ciprofloxacin Diarrhea     Codeine      severe nausea     Codeine Nausea     Lisinopril Anaphylaxis     angioedema     Shellfish Allergy Nausea and Vomiting     Hives on neck     Sulfa Drugs      hives  Other reaction(s): Hives       Past medical, surgical, social and family histories were reviewed and updated in EPIC.    ROS:   12 point review of systems negative other than symptoms noted below or in the HPI.  No urinary frequency or dysuria, bladder or kidney problems    EXAM:  /76   Ht 1.651 m (5' 5\")   Wt 96.2 kg (212 lb)   LMP 06/27/2007   Breastfeeding No   BMI 35.28 kg/m     BMI: Body mass index is 35.28 kg/m .    PHYSICAL EXAM:  Constitutional:   Appearance: Well nourished, well developed, alert, in no acute distress  Neck:  Lymph Nodes:  No lymphadenopathy present    Thyroid:  Gland size normal, nontender, no nodules or masses present  on palpation  Chest:  Respiratory Effort:  Breathing unlabored  Cardiovascular:    Heart: Auscultation:  Regular rate, normal rhythm, no murmurs present  Breasts: Inspection of Breasts:  No lymphadenopathy present., Palpation of Breasts and Axillae:  No masses present on palpation, no breast tenderness., Axillary Lymph Nodes:  No lymphadenopathy present. and No nodularity, asymmetry or nipple discharge bilaterally.  Gastrointestinal:   Abdominal Examination:  Abdomen nontender to palpation, tone normal without rigidity or guarding, no masses present, " umbilicus without lesions   Liver and Spleen:  No hepatomegaly present, liver nontender to palpation    Hernias:  No hernias present  Lymphatic: Lymph Nodes:  No other lymphadenopathy present  Skin:  General Inspection:  No rashes present, no lesions present, no areas of  discoloration  Neurologic:    Mental Status:  Oriented X3.  Normal strength and tone, sensory exam                grossly normal, mentation intact and speech normal.    Psychiatric:   Mentation appears normal and affect normal/bright.         Pelvic Exam:  External Genitalia:     Normal appearance for age, no discharge present, no tenderness present, no inflammatory lesions present, color normal  Vagina:     Normal vaginal vault without central or paravaginal defects, no discharge present, no inflammatory lesions present, no masses present  Bladder:     Nontender to palpation  Urethra:   Urethral Body:  Urethra palpation normal, urethra structural support normal   Urethral Meatus:  No erythema or lesions present  Cervix:     Appearance healthy, no lesions present, nontender to palpation, no bleeding present  Uterus:     Uterus: firm, normal sized and nontender, midplane in position.   Adnexa:     No adnexal tenderness present, no adnexal masses present  Perineum:     Perineum within normal limits, no evidence of trauma, no rashes or skin lesions present  Anus:     Anus within normal limits, no hemorrhoids present  Inguinal Lymph Nodes:     No lymphadenopathy present  Pubic Hair:     Normal pubic hair distribution for age  Genitalia and Groin:     No rashes present, no lesions present, no areas of discoloration, no masses present        BMI: Body mass index is 35.28 kg/m .      ASSESSMENT:  60 year old female with satisfactory annual exam.    ICD-10-CM    1. HSV infection  B00.9 valACYclovir (VALTREX) 500 MG tablet       PLAN:  Refill valtrex  Pelvic exam is ok  She has ongoing pelvic floor pain and is working with phsical therapy    Romina PEARSON  MD [facility]

## 2020-12-23 DIAGNOSIS — E78.5 HYPERLIPIDEMIA LDL GOAL <130: ICD-10-CM

## 2020-12-23 RX ORDER — SIMVASTATIN 40 MG
TABLET ORAL
Qty: 90 TABLET | Refills: 0 | Status: SHIPPED | OUTPATIENT
Start: 2020-12-23 | End: 2021-03-02

## 2020-12-23 ASSESSMENT — ANXIETY QUESTIONNAIRES: GAD7 TOTAL SCORE: 0

## 2021-01-28 ENCOUNTER — THERAPY VISIT (OUTPATIENT)
Dept: CHIROPRACTIC MEDICINE | Facility: CLINIC | Age: 61
End: 2021-01-28
Payer: COMMERCIAL

## 2021-01-28 DIAGNOSIS — M99.03 SOMATIC DYSFUNCTION OF LUMBAR REGION: ICD-10-CM

## 2021-01-28 DIAGNOSIS — M99.02 THORACIC SEGMENT DYSFUNCTION: ICD-10-CM

## 2021-01-28 DIAGNOSIS — M53.3 PAIN IN THE COCCYX: ICD-10-CM

## 2021-01-28 DIAGNOSIS — M99.05 SOMATIC DYSFUNCTION OF PELVIS REGION: Primary | ICD-10-CM

## 2021-01-28 DIAGNOSIS — M99.04 SOMATIC DYSFUNCTION OF SACRAL REGION: ICD-10-CM

## 2021-01-28 PROCEDURE — 98940 CHIROPRACT MANJ 1-2 REGIONS: CPT | Mod: AT | Performed by: CHIROPRACTOR

## 2021-01-28 PROCEDURE — 97810 ACUP 1/> WO ESTIM 1ST 15 MIN: CPT | Mod: GA | Performed by: CHIROPRACTOR

## 2021-01-28 NOTE — PROGRESS NOTES
Visit #: 1/2021  CHRONIC PAIN    Subjective:  Cherry Kim is a 59 year old female who is seen in f/u up for:        Somatic dysfunction of pelvis region  Pain in the coccyx  Somatic dysfunction of sacral region  Somatic dysfunction of lumbar region  Thoracic segment dysfunction.     Since last visit  Cherry Kim reports:    Area of chief complaint:  Lumbar :  Symptoms are graded at 4/10. The quality is described as stiff, achey, dull.  Motion has increased, but is still not normal. The pt reports a return to 65% subjective improvement since initial presentation. She is pleased with her progress. She will be having surgery in the rectal area in 3-4 months. Today she notes tension in the pelvic area. The pt denies weakness or other unusual sx.         Since last visit the patient feels that they are overall slightly worse from the previous treatment.       Objective:  The following was observed:    P: palpatory tenderness Gluteal, Lumbar erector spine and Quad lumb Bilaterally  A: static palpation demonstrates intersegmental asymmetry   R: motion palpation notes restricted motion  T: hypertonicity at: Gluteal, Lumbar erector spine and Quad lumb Bilaterally    Segmental spinal dysfunction/restrictions found at:   T7, T9  Sacrum  and PSIS Left        Assessment:    Diagnoses:      1. Somatic dysfunction of pelvis region    2. Pain in the coccyx    3. Somatic dysfunction of sacral region    4. Somatic dysfunction of lumbar region    5. Thoracic segment dysfunction        Patient's condition:  Exacerbation/regression    Treatment effectiveness:  Post manipulation there is better intersegmental movement and Patient claims to feel looser post manipulation      Procedures:  CMT:  47386 Chiropractic manipulative treatment 1-2   regions performed   Thoracic: Diversified,  T7   Prone   Pelvis: Drop Table, Sacrum , PSIS Left , Prone   Anterior B pubis: drop table on R pubis     Modalities:  27480:  Acupuncture, for 25 minutes:  Points: Ilda Points in lumbar and sacral spine  Ahsi point in hips and legs  Points in the coccyx.     For 20 minutes    Therapeutic procedures:  None     Response to Treatment  Reduction in symptoms as reported by patient    Prognosis: Good    Progress towards Goals: Patient is making progress towards the goal.  Goals:  SLEEPING: the patient specific goal is to attain his pre-injury status of 6-7 hours comfortably  SITTING: the patient specific goal is to attain pre-injury status of  8 hours comfortably         Recommendations:    Instructions:  continue to use the lumbar support     Follow-up:    Return to care in 2 weeks

## 2021-02-03 ENCOUNTER — TRANSFERRED RECORDS (OUTPATIENT)
Dept: HEALTH INFORMATION MANAGEMENT | Facility: CLINIC | Age: 61
End: 2021-02-03

## 2021-02-11 ENCOUNTER — THERAPY VISIT (OUTPATIENT)
Dept: CHIROPRACTIC MEDICINE | Facility: CLINIC | Age: 61
End: 2021-02-11
Payer: COMMERCIAL

## 2021-02-11 DIAGNOSIS — M99.04 SOMATIC DYSFUNCTION OF SACRAL REGION: Primary | ICD-10-CM

## 2021-02-11 DIAGNOSIS — M53.3 PAIN IN THE COCCYX: ICD-10-CM

## 2021-02-11 DIAGNOSIS — M99.05 SOMATIC DYSFUNCTION OF PELVIS REGION: ICD-10-CM

## 2021-02-11 DIAGNOSIS — M99.02 THORACIC SEGMENT DYSFUNCTION: ICD-10-CM

## 2021-02-11 PROCEDURE — 98940 CHIROPRACT MANJ 1-2 REGIONS: CPT | Mod: AT | Performed by: CHIROPRACTOR

## 2021-02-11 PROCEDURE — 97810 ACUP 1/> WO ESTIM 1ST 15 MIN: CPT | Mod: GA | Performed by: CHIROPRACTOR

## 2021-02-11 NOTE — PROGRESS NOTES
Visit #: 2/2021  CHRONIC PAIN    Subjective:  Cherry Kim is a 59 year old female who is seen in f/u up for:        Somatic dysfunction of sacral region  Pain in the coccyx  Somatic dysfunction of pelvis region  Thoracic segment dysfunction.     Since last visit  Cherry Kim reports:    Area of chief complaint:  Lumbar :  Symptoms are graded at 4/10. The quality is described as stiff, achey, dull.  Motion has increased, but is still not normal. The pt reports a return to 60% subjective improvement since initial presentation. She has been quite stable over the past several weeks. She will note coccyx pain and sacral pain when sitting for prolonged periods. The pt will be having abscess surgery in the following 2 months. She feels the ACP and Chiropractic are reducing her pain levels. The pt denies weakness or other unusual sx.       Since last visit the patient feels that they are overall slightly worse from the previous treatment.       Objective:  The following was observed:    P: palpatory tenderness Gluteal, Lumbar erector spine and Quad lumb Bilaterally  A: static palpation demonstrates intersegmental asymmetry   R: motion palpation notes restricted motion  T: hypertonicity at: Gluteal, Lumbar erector spine and Quad lumb Bilaterally    Segmental spinal dysfunction/restrictions found at:  T8, T10   Sacrum  and PSIS Left        Assessment:    Diagnoses:      1. Somatic dysfunction of sacral region    2. Pain in the coccyx    3. Somatic dysfunction of pelvis region    4. Thoracic segment dysfunction        Patient's condition:  No change     Treatment effectiveness:  Post manipulation there is better intersegmental movement and Patient claims to feel looser post manipulation      Procedures:  CMT:  59783 Chiropractic manipulative treatment 1-2   regions performed   Thoracic: Diversified,  T8, T10  Prone   Pelvis: Drop Table, Sacrum , PSIS Left , Prone   Anterior B pubis: drop table on R  pubis     Modalities:  72957: Acupuncture, for 25 minutes:  Points: [place] Points in lumbar and sacral spine  Ahsi points in hips and legs  Points in the coccyx.     For 20 minutes    Therapeutic procedures:  None     Response to Treatment  Reduction in symptoms as reported by patient    Prognosis: Good    Progress towards Goals: Patient is making progress towards the goal.  Goals:  SLEEPING: the patient specific goal is to attain his pre-injury status of 6-7 hours comfortably  SITTING: the patient specific goal is to attain pre-injury status of  8 hours comfortably         Recommendations:    Instructions:  continue to use the lumbar support     Follow-up:    Return to care none

## 2021-02-22 DIAGNOSIS — Z11.59 ENCOUNTER FOR SCREENING FOR OTHER VIRAL DISEASES: ICD-10-CM

## 2021-03-02 ENCOUNTER — OFFICE VISIT (OUTPATIENT)
Dept: INTERNAL MEDICINE | Facility: CLINIC | Age: 61
End: 2021-03-02
Payer: COMMERCIAL

## 2021-03-02 VITALS
DIASTOLIC BLOOD PRESSURE: 80 MMHG | HEART RATE: 73 BPM | WEIGHT: 212 LBS | OXYGEN SATURATION: 97 % | SYSTOLIC BLOOD PRESSURE: 118 MMHG | BODY MASS INDEX: 35.32 KG/M2 | RESPIRATION RATE: 15 BRPM | TEMPERATURE: 97 F | HEIGHT: 65 IN

## 2021-03-02 DIAGNOSIS — I10 ESSENTIAL HYPERTENSION, BENIGN: ICD-10-CM

## 2021-03-02 DIAGNOSIS — E03.4 HYPOTHYROIDISM DUE TO ACQUIRED ATROPHY OF THYROID: ICD-10-CM

## 2021-03-02 DIAGNOSIS — Z23 NEED FOR VACCINATION: ICD-10-CM

## 2021-03-02 DIAGNOSIS — E66.01 MORBID OBESITY (H): ICD-10-CM

## 2021-03-02 DIAGNOSIS — E78.5 HYPERLIPIDEMIA LDL GOAL <130: ICD-10-CM

## 2021-03-02 DIAGNOSIS — Z01.818 PREOP GENERAL PHYSICAL EXAM: Primary | ICD-10-CM

## 2021-03-02 LAB
ANION GAP SERPL CALCULATED.3IONS-SCNC: 2 MMOL/L (ref 3–14)
BUN SERPL-MCNC: 14 MG/DL (ref 7–30)
CALCIUM SERPL-MCNC: 10.7 MG/DL (ref 8.5–10.1)
CHLORIDE SERPL-SCNC: 103 MMOL/L (ref 94–109)
CHOLEST SERPL-MCNC: 156 MG/DL
CO2 SERPL-SCNC: 31 MMOL/L (ref 20–32)
CREAT SERPL-MCNC: 0.82 MG/DL (ref 0.52–1.04)
GFR SERPL CREATININE-BSD FRML MDRD: 78 ML/MIN/{1.73_M2}
GLUCOSE SERPL-MCNC: 100 MG/DL (ref 70–99)
HDLC SERPL-MCNC: 46 MG/DL
HGB BLD-MCNC: 13.5 G/DL (ref 11.7–15.7)
LDLC SERPL CALC-MCNC: 90 MG/DL
NONHDLC SERPL-MCNC: 110 MG/DL
POTASSIUM SERPL-SCNC: 4.3 MMOL/L (ref 3.4–5.3)
SODIUM SERPL-SCNC: 136 MMOL/L (ref 133–144)
TRIGL SERPL-MCNC: 100 MG/DL
TSH SERPL DL<=0.005 MIU/L-ACNC: 2.61 MU/L (ref 0.4–4)

## 2021-03-02 PROCEDURE — 90471 IMMUNIZATION ADMIN: CPT | Performed by: INTERNAL MEDICINE

## 2021-03-02 PROCEDURE — 80048 BASIC METABOLIC PNL TOTAL CA: CPT | Performed by: INTERNAL MEDICINE

## 2021-03-02 PROCEDURE — 80061 LIPID PANEL: CPT | Performed by: INTERNAL MEDICINE

## 2021-03-02 PROCEDURE — 99214 OFFICE O/P EST MOD 30 MIN: CPT | Mod: 25 | Performed by: INTERNAL MEDICINE

## 2021-03-02 PROCEDURE — 90750 HZV VACC RECOMBINANT IM: CPT | Performed by: INTERNAL MEDICINE

## 2021-03-02 PROCEDURE — 84443 ASSAY THYROID STIM HORMONE: CPT | Performed by: INTERNAL MEDICINE

## 2021-03-02 PROCEDURE — 85018 HEMOGLOBIN: CPT | Performed by: INTERNAL MEDICINE

## 2021-03-02 PROCEDURE — 36415 COLL VENOUS BLD VENIPUNCTURE: CPT | Performed by: INTERNAL MEDICINE

## 2021-03-02 RX ORDER — SPIRONOLACTONE AND HYDROCHLOROTHIAZIDE 25; 25 MG/1; MG/1
1 TABLET ORAL DAILY
Qty: 90 TABLET | Refills: 3 | Status: SHIPPED | OUTPATIENT
Start: 2021-03-02 | End: 2022-06-09

## 2021-03-02 RX ORDER — METOPROLOL TARTRATE 50 MG
TABLET ORAL
Qty: 180 TABLET | Refills: 3 | Status: SHIPPED | OUTPATIENT
Start: 2021-03-02 | End: 2022-04-04

## 2021-03-02 RX ORDER — SIMVASTATIN 40 MG
TABLET ORAL
Qty: 90 TABLET | Refills: 3 | Status: SHIPPED | OUTPATIENT
Start: 2021-03-02 | End: 2022-04-05

## 2021-03-02 RX ORDER — LEVOTHYROXINE SODIUM 137 UG/1
137 TABLET ORAL DAILY
Qty: 90 TABLET | Refills: 3 | Status: SHIPPED | OUTPATIENT
Start: 2021-03-02 | End: 2022-05-17

## 2021-03-02 ASSESSMENT — MIFFLIN-ST. JEOR: SCORE: 1537.51

## 2021-03-02 NOTE — PATIENT INSTRUCTIONS

## 2021-03-02 NOTE — PROGRESS NOTES
[address] [address]  Phone: [phone]  Primary Provider: Nathaniel Shannon  Pre-op Performing Provider: NATHANIEL SHANNON      PREOPERATIVE EVALUATION:  Today's date: 3/2/2021    Cherry Kim is a 60 year old female who presents for a preoperative evaluation.    Surgical Information:  Surgery/Procedure: Exam under anesthesia rectum, placement seton stitch   Surgery Location: [facility]   Surgeon: Dr. Silveira   Surgery Date: 3/11/21  Time of Surgery: 1:00 pm   Where patient plans to recover: At home with family  Fax number for surgical facility: Note does not need to be faxed, will be available electronically in Epic.    Type of Anesthesia Anticipated: MAC     Assessment & Plan     The proposed surgical procedure is considered LOW risk.    Preop general physical exam  Essential hypertension, benign  Hyperlipidemia LDL goal <130  Hypothyroidism due to acquired atrophy of thyroid  Morbid obesity (H)    Medication Instructions:  Patient is to take all scheduled medications on the day of surgery EXCEPT for modifications listed below:   - Diuretics: HOLD on the day of surgery.   - naproxen (Aleve, Naprosyn): HOLD 4 days before surgery.     RECOMMENDATION:  APPROVAL GIVEN to proceed with proposed procedure, without further diagnostic evaluation.              Subjective     HPI related to upcoming procedure: Exam under anesthesia rectum, placement seton stitch     Preop Questions 3/2/2021   1. Have you ever had a heart attack or stroke? No   2. Have you ever had surgery on your heart or blood vessels, such as a stent placement, a coronary artery bypass, or surgery on an artery in your head, neck, heart, or legs? No   3. Do you have chest pain with activity? No   4. Do you have a history of  heart failure? No   5. Do you currently have a cold, bronchitis or symptoms of other infection? No   6. Do you have a cough, shortness of breath, or  wheezing? No   7. Do you or anyone in your family have previous history of blood clots? No   8. Do you or does anyone in your family have a serious bleeding problem such as prolonged bleeding following surgeries or cuts? No   9. Have you ever had problems with anemia or been told to take iron pills? No   10. Have you had any abnormal blood loss such as black, tarry or bloody stools, or abnormal vaginal bleeding? No   11. Have you ever had a blood transfusion? No   12. Are you willing to have a blood transfusion if it is medically needed before, during, or after your surgery? Yes   13. Have you or any of your relatives ever had problems with anesthesia? No   14. Do you have sleep apnea, excessive snoring or daytime drowsiness? UNKNOWN    15. Do you have any artifical heart valves or other implanted medical devices like a pacemaker, defibrillator, or continuous glucose monitor? No   16. Do you have artificial joints? No   17. Are you allergic to latex? No   18. Is there any chance that you may be pregnant? No       Health Care Directive:  Patient does not have a Health Care Directive or Living Will: Discussed advance care planning with patient; information given to patient to review.    Preoperative Review of :   reviewed - no record of controlled substances prescribed.          Review of Systems  Constitutional, neuro, ENT, endocrine, pulmonary, cardiac, gastrointestinal, genitourinary, musculoskeletal, integument and psychiatric systems are negative, except as otherwise noted.    Patient Active Problem List    Diagnosis Date Noted     Connective tissue disorder (H)      Priority: High     undifferentiated       Essential hypertension, benign 03/28/2003     Priority: High     Somatic dysfunction of lumbar region 04/12/2019     Priority: Medium     Lumbago 04/12/2019     Priority: Medium     Somatic dysfunction of sacral region 04/12/2019     Priority: Medium     Thoracic segment dysfunction 04/12/2019      Priority: Medium     Somatic dysfunction of pelvis region 11/09/2018     Priority: Medium     Pain in the coccyx 11/09/2018     Priority: Medium     Advanced directives, counseling/discussion 04/14/2017     Priority: Medium     Advance Care Planning 4/14/2017: ACP Review of Chart / Resources Provided:  Reviewed chart for advance care plan.  Cherry Kim has been provided information and resources to begin or update their advance care plan.  Added by Ivanna luna 11/13/2013     Priority: Medium     Hyperlipidemia LDL goal <130 09/21/2012     Priority: Medium     Impaired fasting glucose      Priority: Medium     Crustacean allergy      Priority: Medium     Obesity      Priority: Medium     Problem list name updated by automated process. Provider to review       Other specified disorder of rectum and anus      Priority: Medium     Rectal abscess at age 38, occasional rectal bleeding since then       Hemorrhoids      Priority: Medium     Problem list name updated by automated process. Provider to review       Family history of malignant neoplasm of breast 06/20/2006     Priority: Medium     Proteinuria 03/28/2003     Priority: Medium     Hypothyroidism 11/12/2002     Priority: Medium     Problem list name updated by automated process. Provider to review       Health Care Home 06/22/2011     Priority: Low     x  DX V65.8 REPLACED WITH [id number] [facility] CARE HOME (04/08/2013)        Past Medical History:   Diagnosis Date     Anal fistula      ASCUS on Pap smear     1980s- none since     Connective tissue disorder (H)     undifferentiated     Essential hypertension, benign      Genital herpes 2017     Impaired fasting glucose      Obesity, unspecified      Pelvic floor dysfunction      Dede-rectal abscess     Rectal abscess at age 38, occasional rectal bleeding since then     Proteinuria     0.400 g/day negative w/u     Shellfish Allergy      Unspecified hemorrhoids  without mention of complication      Unspecified hypothyroidism      Past Surgical History:   Procedure Laterality Date     CRYOTHERAPY, CERVICAL      1980s     HC RECONSTR NOSE+BULL SEPTAL REPAIR       1979     [facility] NONSPECIFIC PROCEDURE      Tosillectomy     [facility] NONSPECIFIC PROCEDURE  1997    rectal abcess     Current Outpatient Medications   Medication Sig Dispense Refill     CO Q-10 100 MG OR CAPS 1 daily       EPINEPHrine (EPIPEN/ADRENACLICK/OR ANY BX GENERIC EQUIV) 0.3 MG/0.3ML injection 2-pack Inject 0.3 mLs (0.3 mg) into the muscle as needed for anaphylaxis 0.6 mL 11     HYDROXYCHLOROQUINE SULFATE 200 MG PO TABS 1 TABLET DAILY       Iodoquinol-HC (HYDROCORTISONE-IODOQUINOL) 1-1 % CREA Externally apply  topically daily as needed.       levothyroxine (SYNTHROID/LEVOTHROID) 137 MCG tablet Take 1 tablet (137 mcg) by mouth daily 90 tablet 3     Lidocaine 2 % GEL Place 1 Dose rectally every 4 hours as needed (anal pain from fissure) 30 g 0     metoprolol tartrate (LOPRESSOR) 50 MG tablet TAKE 1 TABLET BY MOUTH TWICE A  tablet 2     naproxen (NAPROSYN) 500 MG tablet TK 1 OR 2 TS PO QD PRN  0     nifedipine 0.2% in white petrolatum 0.2 % OINT ointment Place rectally every 6 hours 45 g 1     simvastatin (ZOCOR) 40 MG tablet TAKE 1 TABLET BY MOUTH EVERYDAY AT BEDTIME 90 tablet 0     spironolactone-HCTZ (ALDACTAZIDE) 25-25 MG tablet TAKE 1 TABLET BY MOUTH EVERY DAY 90 tablet 3     Vaginal Lubricant (REPLENS) GEL Place vaginally as needed       valACYclovir (VALTREX) 500 MG tablet Take 1 tablet (500 mg) by mouth daily 90 tablet 3       Allergies   Allergen Reactions     Lisinopril Anaphylaxis     Ciprofloxacin Diarrhea     Codeine      severe nausea     Codeine Nausea     Lisinopril Anaphylaxis     angioedema     Shellfish Allergy Nausea and Vomiting     Hives on neck     Sulfa Drugs      hives  Other reaction(s): Hives        Social History     Tobacco Use     Smoking status: Never Smoker     Smokeless tobacco:  Never Used   Substance Use Topics     Alcohol use: Yes     Comment: 2-3 times per week       History   Drug Use No         Objective     LMP 06/27/2007     Physical Exam    GENERAL APPEARANCE: healthy, no distress and over weight     EYES: EOMI, PERRL     HENT: nose and mouth without ulcers or lesions and normal cephalic/atraumatic     NECK: no adenopathy, no asymmetry, masses, or scars and thyroid normal to palpation     RESP: lungs clear to auscultation - no rales, rhonchi or wheezes     CV: regular rates and rhythm, normal S1 S2, no S3 or S4 and no murmur, click or rub     ABDOMEN:  soft, nontender, no HSM or masses and bowel sounds normal     MS: extremities normal- no gross deformities noted, no evidence of inflammation in joints, FROM in all extremities.     SKIN: no suspicious lesions or rashes     NEURO: Normal strength and tone, sensory exam grossly normal, mentation intact and speech normal     PSYCH: mentation appears normal. and affect normal/bright     LYMPHATICS: No cervical adenopathy    Recent Labs   Lab Test 07/21/20 09/19/19  0859   NA  --  136   POTASSIUM  --  4.2   CR 0.800 0.79        Diagnostics:  Recent Results (from the past 24 hour(s))   Basic metabolic panel    Collection Time: 03/02/21  9:29 AM   Result Value Ref Range    Sodium 136 133 - 144 mmol/L    Potassium 4.3 3.4 - 5.3 mmol/L    Chloride 103 94 - 109 mmol/L    Carbon Dioxide 31 20 - 32 mmol/L    Anion Gap 2 (L) 3 - 14 mmol/L    Glucose 100 (H) 70 - 99 mg/dL    Urea Nitrogen 14 7 - 30 mg/dL    Creatinine 0.82 0.52 - 1.04 mg/dL    GFR Estimate 78 >60 mL/min/[1.73_m2]    GFR Estimate If Black >90 >60 mL/min/[1.73_m2]    Calcium 10.7 (H) 8.5 - 10.1 mg/dL   Lipid panel reflex to direct LDL Fasting    Collection Time: 03/02/21  9:29 AM   Result Value Ref Range    Cholesterol 156 <200 mg/dL    Triglycerides 100 <150 mg/dL    HDL Cholesterol 46 (L) >49 mg/dL    LDL Cholesterol Calculated 90 <100 mg/dL    Non HDL Cholesterol 110 <130 mg/dL    TSH with free T4 reflex    Collection Time: 03/02/21  9:29 AM   Result Value Ref Range    TSH 2.61 0.40 - 4.00 mU/L   Hemoglobin    Collection Time: 03/02/21  9:29 AM   Result Value Ref Range    Hemoglobin 13.5 11.7 - 15.7 g/dL      No EKG required for low risk surgery (cataract, skin procedure, breast biopsy, etc).    Revised Cardiac Risk Index (RCRI):  The patient has the following serious cardiovascular risks for perioperative complications:   - No serious cardiac risks = 0 points     RCRI Interpretation: 0 points: Class I (very low risk - 0.4% complication rate)       Signed Electronically by: Krish Shannon MD  Copy of this evaluation report is provided to requesting physician.    [facility] Pre[facility] Guidelines    Revised Cardiac Risk Index

## 2021-03-05 ENCOUNTER — TRANSFERRED RECORDS (OUTPATIENT)
Dept: HEALTH INFORMATION MANAGEMENT | Facility: CLINIC | Age: 61
End: 2021-03-05

## 2021-03-07 DIAGNOSIS — Z11.59 ENCOUNTER FOR SCREENING FOR OTHER VIRAL DISEASES: ICD-10-CM

## 2021-03-07 LAB
SARS-COV-2 RNA RESP QL NAA+PROBE: NORMAL
SPECIMEN SOURCE: NORMAL

## 2021-03-07 PROCEDURE — U0003 INFECTIOUS AGENT DETECTION BY NUCLEIC ACID (DNA OR RNA); SEVERE ACUTE RESPIRATORY SYNDROME CORONAVIRUS 2 (SARS-COV-2) (CORONAVIRUS DISEASE [COVID-19]), AMPLIFIED PROBE TECHNIQUE, MAKING USE OF HIGH THROUGHPUT TECHNOLOGIES AS DESCRIBED BY CMS-2020-01-R: HCPCS | Performed by: COLON & RECTAL SURGERY

## 2021-03-07 PROCEDURE — U0005 INFEC AGEN DETEC AMPLI PROBE: HCPCS | Performed by: COLON & RECTAL SURGERY

## 2021-03-08 LAB
LABORATORY COMMENT REPORT: NORMAL
SARS-COV-2 RNA RESP QL NAA+PROBE: NEGATIVE
SPECIMEN SOURCE: NORMAL

## 2021-03-10 ENCOUNTER — ANESTHESIA EVENT (OUTPATIENT)
Dept: SURGERY | Facility: CLINIC | Age: 61
End: 2021-03-10
Payer: COMMERCIAL

## 2021-03-10 RX ORDER — FENTANYL CITRATE 50 UG/ML
25-50 INJECTION, SOLUTION INTRAMUSCULAR; INTRAVENOUS
Status: CANCELLED | OUTPATIENT
Start: 2021-03-10

## 2021-03-11 ENCOUNTER — ANESTHESIA (OUTPATIENT)
Dept: SURGERY | Facility: CLINIC | Age: 61
End: 2021-03-11
Payer: COMMERCIAL

## 2021-03-11 ENCOUNTER — HOSPITAL ENCOUNTER (OUTPATIENT)
Facility: CLINIC | Age: 61
Discharge: HOME | End: 2021-03-11
Attending: COLON & RECTAL SURGERY | Admitting: COLON & RECTAL SURGERY
Payer: COMMERCIAL

## 2021-03-11 VITALS
HEART RATE: 75 BPM | OXYGEN SATURATION: 95 % | RESPIRATION RATE: 16 BRPM | WEIGHT: 211.8 LBS | HEIGHT: 65 IN | BODY MASS INDEX: 35.29 KG/M2 | DIASTOLIC BLOOD PRESSURE: 75 MMHG | SYSTOLIC BLOOD PRESSURE: 128 MMHG | TEMPERATURE: 96.9 F

## 2021-03-11 DIAGNOSIS — M53.3 PAIN IN THE COCCYX: Primary | ICD-10-CM

## 2021-03-11 PROCEDURE — 370N000017 HC ANESTHESIA TECHNICAL FEE, PER MIN: Performed by: COLON & RECTAL SURGERY

## 2021-03-11 PROCEDURE — 710N000012 HC RECOVERY PHASE 2, PER MINUTE: Performed by: COLON & RECTAL SURGERY

## 2021-03-11 PROCEDURE — 360N000075 HC SURGERY LEVEL 2, PER MIN: Performed by: COLON & RECTAL SURGERY

## 2021-03-11 PROCEDURE — 250N000011 HC RX IP 250 OP 636: Performed by: COLON & RECTAL SURGERY

## 2021-03-11 PROCEDURE — 710N000009 HC RECOVERY PHASE 1, LEVEL 1, PER MIN: Performed by: COLON & RECTAL SURGERY

## 2021-03-11 PROCEDURE — 250N000013 HC RX MED GY IP 250 OP 250 PS 637: Performed by: ANESTHESIOLOGY

## 2021-03-11 PROCEDURE — 272N000001 HC OR GENERAL SUPPLY STERILE: Performed by: COLON & RECTAL SURGERY

## 2021-03-11 PROCEDURE — 250N000013 HC RX MED GY IP 250 OP 250 PS 637: Performed by: COLON & RECTAL SURGERY

## 2021-03-11 PROCEDURE — 250N000011 HC RX IP 250 OP 636: Performed by: NURSE ANESTHETIST, CERTIFIED REGISTERED

## 2021-03-11 PROCEDURE — 250N000025 HC SEVOFLURANE, PER MIN: Performed by: COLON & RECTAL SURGERY

## 2021-03-11 PROCEDURE — 258N000003 HC RX IP 258 OP 636: Performed by: NURSE ANESTHETIST, CERTIFIED REGISTERED

## 2021-03-11 PROCEDURE — 250N000009 HC RX 250: Performed by: COLON & RECTAL SURGERY

## 2021-03-11 PROCEDURE — 999N000141 HC STATISTIC PRE-PROCEDURE NURSING ASSESSMENT: Performed by: COLON & RECTAL SURGERY

## 2021-03-11 RX ORDER — NALOXONE HYDROCHLORIDE 0.4 MG/ML
0.4 INJECTION, SOLUTION INTRAMUSCULAR; INTRAVENOUS; SUBCUTANEOUS
Status: DISCONTINUED | OUTPATIENT
Start: 2021-03-11 | End: 2021-03-11 | Stop reason: HOSPADM

## 2021-03-11 RX ORDER — ONDANSETRON 2 MG/ML
4 INJECTION INTRAMUSCULAR; INTRAVENOUS ONCE
Status: COMPLETED | OUTPATIENT
Start: 2021-03-11 | End: 2021-03-11

## 2021-03-11 RX ORDER — PROPOFOL 10 MG/ML
INJECTION, EMULSION INTRAVENOUS PRN
Status: DISCONTINUED | OUTPATIENT
Start: 2021-03-11 | End: 2021-03-11

## 2021-03-11 RX ORDER — MEPERIDINE HYDROCHLORIDE 25 MG/ML
12.5 INJECTION INTRAMUSCULAR; INTRAVENOUS; SUBCUTANEOUS
Status: DISCONTINUED | OUTPATIENT
Start: 2021-03-11 | End: 2021-03-11 | Stop reason: HOSPADM

## 2021-03-11 RX ORDER — ACETAMINOPHEN 325 MG/1
975 TABLET ORAL ONCE
Status: COMPLETED | OUTPATIENT
Start: 2021-03-11 | End: 2021-03-11

## 2021-03-11 RX ORDER — OXYCODONE HYDROCHLORIDE 5 MG/1
5-10 TABLET ORAL
Qty: 10 TABLET | Refills: 0 | Status: SHIPPED | OUTPATIENT
Start: 2021-03-11 | End: 2021-12-29

## 2021-03-11 RX ORDER — HYDROMORPHONE HYDROCHLORIDE 1 MG/ML
.3-.5 INJECTION, SOLUTION INTRAMUSCULAR; INTRAVENOUS; SUBCUTANEOUS EVERY 10 MIN PRN
Status: DISCONTINUED | OUTPATIENT
Start: 2021-03-11 | End: 2021-03-11 | Stop reason: HOSPADM

## 2021-03-11 RX ORDER — MAGNESIUM HYDROXIDE 1200 MG/15ML
LIQUID ORAL PRN
Status: DISCONTINUED | OUTPATIENT
Start: 2021-03-11 | End: 2021-03-11 | Stop reason: HOSPADM

## 2021-03-11 RX ORDER — FENTANYL CITRATE 0.05 MG/ML
25-50 INJECTION, SOLUTION INTRAMUSCULAR; INTRAVENOUS
Status: DISCONTINUED | OUTPATIENT
Start: 2021-03-11 | End: 2021-03-11 | Stop reason: HOSPADM

## 2021-03-11 RX ORDER — FENTANYL CITRATE 50 UG/ML
INJECTION, SOLUTION INTRAMUSCULAR; INTRAVENOUS PRN
Status: DISCONTINUED | OUTPATIENT
Start: 2021-03-11 | End: 2021-03-11

## 2021-03-11 RX ORDER — DEXAMETHASONE SODIUM PHOSPHATE 4 MG/ML
INJECTION, SOLUTION INTRA-ARTICULAR; INTRALESIONAL; INTRAMUSCULAR; INTRAVENOUS; SOFT TISSUE PRN
Status: DISCONTINUED | OUTPATIENT
Start: 2021-03-11 | End: 2021-03-11

## 2021-03-11 RX ORDER — NALOXONE HYDROCHLORIDE 0.4 MG/ML
0.2 INJECTION, SOLUTION INTRAMUSCULAR; INTRAVENOUS; SUBCUTANEOUS
Status: DISCONTINUED | OUTPATIENT
Start: 2021-03-11 | End: 2021-03-11 | Stop reason: HOSPADM

## 2021-03-11 RX ORDER — SODIUM CHLORIDE, SODIUM LACTATE, POTASSIUM CHLORIDE, CALCIUM CHLORIDE 600; 310; 30; 20 MG/100ML; MG/100ML; MG/100ML; MG/100ML
INJECTION, SOLUTION INTRAVENOUS CONTINUOUS PRN
Status: DISCONTINUED | OUTPATIENT
Start: 2021-03-11 | End: 2021-03-11

## 2021-03-11 RX ORDER — PROPOFOL 10 MG/ML
INJECTION, EMULSION INTRAVENOUS CONTINUOUS PRN
Status: DISCONTINUED | OUTPATIENT
Start: 2021-03-11 | End: 2021-03-11

## 2021-03-11 RX ORDER — ONDANSETRON 4 MG/1
4 TABLET, ORALLY DISINTEGRATING ORAL
Status: DISCONTINUED | OUTPATIENT
Start: 2021-03-11 | End: 2021-03-11 | Stop reason: HOSPADM

## 2021-03-11 RX ORDER — OXYCODONE HYDROCHLORIDE 5 MG/1
5 TABLET ORAL ONCE
Status: COMPLETED | OUTPATIENT
Start: 2021-03-11 | End: 2021-03-11

## 2021-03-11 RX ORDER — SODIUM CHLORIDE, SODIUM LACTATE, POTASSIUM CHLORIDE, CALCIUM CHLORIDE 600; 310; 30; 20 MG/100ML; MG/100ML; MG/100ML; MG/100ML
INJECTION, SOLUTION INTRAVENOUS CONTINUOUS
Status: DISCONTINUED | OUTPATIENT
Start: 2021-03-11 | End: 2021-03-11 | Stop reason: HOSPADM

## 2021-03-11 RX ORDER — ONDANSETRON 4 MG/1
4 TABLET, ORALLY DISINTEGRATING ORAL EVERY 30 MIN PRN
Status: DISCONTINUED | OUTPATIENT
Start: 2021-03-11 | End: 2021-03-11 | Stop reason: HOSPADM

## 2021-03-11 RX ORDER — ACETAMINOPHEN 325 MG/1
650 TABLET ORAL
Status: DISCONTINUED | OUTPATIENT
Start: 2021-03-11 | End: 2021-03-11 | Stop reason: HOSPADM

## 2021-03-11 RX ORDER — ONDANSETRON 2 MG/ML
4 INJECTION INTRAMUSCULAR; INTRAVENOUS EVERY 30 MIN PRN
Status: DISCONTINUED | OUTPATIENT
Start: 2021-03-11 | End: 2021-03-11 | Stop reason: HOSPADM

## 2021-03-11 RX ORDER — ACETAMINOPHEN 325 MG/1
650 TABLET ORAL 4 TIMES DAILY
Qty: 100 TABLET | Refills: 0 | Status: SHIPPED | OUTPATIENT
Start: 2021-03-11 | End: 2021-03-25

## 2021-03-11 RX ADMIN — PROPOFOL 30 MCG/KG/MIN: 10 INJECTION, EMULSION INTRAVENOUS at 13:27

## 2021-03-11 RX ADMIN — OXYCODONE HYDROCHLORIDE 5 MG: 5 TABLET ORAL at 14:43

## 2021-03-11 RX ADMIN — ACETAMINOPHEN 975 MG: 325 TABLET, FILM COATED ORAL at 12:06

## 2021-03-11 RX ADMIN — PROPOFOL 200 MG: 10 INJECTION, EMULSION INTRAVENOUS at 13:20

## 2021-03-11 RX ADMIN — FENTANYL CITRATE 25 MCG: 50 INJECTION, SOLUTION INTRAMUSCULAR; INTRAVENOUS at 14:04

## 2021-03-11 RX ADMIN — MIDAZOLAM 2 MG: 1 INJECTION INTRAMUSCULAR; INTRAVENOUS at 13:15

## 2021-03-11 RX ADMIN — FENTANYL CITRATE 50 MCG: 50 INJECTION, SOLUTION INTRAMUSCULAR; INTRAVENOUS at 13:19

## 2021-03-11 RX ADMIN — ONDANSETRON 4 MG: 2 INJECTION INTRAMUSCULAR; INTRAVENOUS at 13:44

## 2021-03-11 RX ADMIN — SODIUM CHLORIDE, POTASSIUM CHLORIDE, SODIUM LACTATE AND CALCIUM CHLORIDE: 600; 310; 30; 20 INJECTION, SOLUTION INTRAVENOUS at 13:14

## 2021-03-11 RX ADMIN — FENTANYL CITRATE 25 MCG: 50 INJECTION, SOLUTION INTRAMUSCULAR; INTRAVENOUS at 13:34

## 2021-03-11 RX ADMIN — DEXAMETHASONE SODIUM PHOSPHATE 4 MG: 4 INJECTION, SOLUTION INTRA-ARTICULAR; INTRALESIONAL; INTRAMUSCULAR; INTRAVENOUS; SOFT TISSUE at 13:30

## 2021-03-11 ASSESSMENT — MIFFLIN-ST. JEOR: SCORE: 1536.6

## 2021-03-11 NOTE — ANESTHESIA PREPROCEDURE EVALUATION
Anesthesia Pre-Procedure Evaluation    Patient: Cherry Kim   MRN: [id number] : 1960        Preoperative Diagnosis: Anal fistula [K60.3]   Procedure : Procedure(s):  EXAM UNDER ANESTHESIA, POSSIBLE DRAINAGE OF ABSCESS  PLACEMENT OF SETON     Past Medical History:   Diagnosis Date     Anal fistula      Arthritis 12/1/10    Connective tissue disease     ASCUS on Pap smear     - none since     Connective tissue disorder (H)     undifferentiated     Essential hypertension, benign      Genital herpes 2017     Impaired fasting glucose      Obesity, unspecified      Pelvic floor dysfunction      Dede-rectal abscess     Rectal abscess at age 38, occasional rectal bleeding since then     Proteinuria     0.400 g/day negative w/u     Shellfish Allergy      Unspecified hemorrhoids without mention of complication      Unspecified hypothyroidism       Past Surgical History:   Procedure Laterality Date     COLONOSCOPY  2007     COSMETIC SURGERY  1978     CRYOTHERAPY, CERVICAL           HC RECONSTR NOSE+BULL SEPTAL REPAIR            ZZC NONSPECIFIC PROCEDURE      Tosillectomy     ZZC NONSPECIFIC PROCEDURE      rectal abcess      Allergies   Allergen Reactions     Lisinopril Anaphylaxis     Ciprofloxacin Diarrhea     Codeine      severe nausea     Codeine Nausea     Lisinopril Anaphylaxis     angioedema     Shellfish Allergy Nausea and Vomiting     Hives on neck     Sulfa Drugs      hives  Other reaction(s): Hives      Social History     Tobacco Use     Smoking status: Never Smoker     Smokeless tobacco: Never Used   Substance Use Topics     Alcohol use: Yes     Comment: 1/ day      Wt Readings from Last 1 Encounters:   21 96.1 kg (211 lb 12.8 oz)        Anesthesia Evaluation            ROS/MED HX  ENT/Pulmonary:       Neurologic:       Cardiovascular:     (+) hypertension-----    METS/Exercise Tolerance:     Hematologic:       Musculoskeletal:       GI/Hepatic: Comment: Rectal  abscess      Renal/Genitourinary:       Endo:     (+) thyroid problem, hypothyroidism, Obesity,     Psychiatric/Substance Use:       Infectious Disease:       Malignancy:       Other:               OUTSIDE LABS:  CBC:   Lab Results   Component Value Date    WBC 4.7 07/21/2016    WBC 7.0 04/26/2010    HGB 13.5 03/02/2021    HGB 13.5 07/21/2016    HCT 39.4 07/21/2016    HCT 43.4 04/26/2010     07/21/2016     04/26/2010     BMP:   Lab Results   Component Value Date     03/02/2021     09/19/2019    POTASSIUM 4.3 03/02/2021    POTASSIUM 4.2 09/19/2019    CHLORIDE 103 03/02/2021    CHLORIDE 103 09/19/2019    CO2 31 03/02/2021    CO2 28 09/19/2019    BUN 14 03/02/2021    BUN 18 09/19/2019    CR 0.82 03/02/2021    CR 0.800 07/21/2020     (H) 03/02/2021     (H) 09/19/2019     COAGS: No results found for: PTT, INR, FIBR  POC:   Lab Results   Component Value Date    HCG Negative 09/19/2007    HCGS  01/18/2010     Negative   This test provides a presumptive diagnosis of pregnancy or non-pregnancy. A   confirmed pregnancy diagnosis should only be made by a physician after all   clinical and laboratory findings have been evaluated.     HEPATIC:   Lab Results   Component Value Date    ALBUMIN 4.6 01/26/2015    PROTTOTAL 8.5 09/15/2008    ALT 36 (A) 07/21/2020    AST 29 07/21/2020    ALKPHOS 79 09/15/2008    BILITOTAL 0.4 09/15/2008     OTHER:   Lab Results   Component Value Date    A1C 5.5 09/26/2012    CECIL 10.7 (H) 03/02/2021    TSH 2.61 03/02/2021    T4 1.07 02/04/2016    CRP 5.9 04/26/2010       Anesthesia Plan    ASA Status:  2   NPO Status:  NPO Appropriate    Anesthesia Type: MAC.     - Reason for MAC: straight local not clinically adequate   Induction: Propofol.           Consents    Anesthesia Plan(s) and associated risks, benefits, and realistic alternatives discussed. Questions answered and patient/representative(s) expressed understanding.     - Discussed with:  Patient      -  Extended Intubation/Ventilatory Support Discussed: No.      - Patient is DNR/DNI Status: No    Use of blood products discussed: No .     Postoperative Care    Pain management: IV analgesics, Multi-modal analgesia.   PONV prophylaxis: Ondansetron (or other 5HT-3), Dexamethasone or Solumedrol     Comments:                Nadeem Dwyer DO

## 2021-03-11 NOTE — BRIEF OP NOTE
[facility]    Brief Operative Note    Pre-operative diagnosis: Anal fistula [K60.3]  Post-operative diagnosis Same as pre-operative diagnosis    Procedure: Procedure(s):  EXAM UNDER ANESTHESIA  PLACEMENT OF SETON  Surgeon: Surgeon(s) and Role:     * Elaine Silveira MD - Primary   Fellow- Charly Cortez MD  Anesthesia: Monitor Anesthesia Care   Estimated blood loss: Minimal  Drains: Seton place in fistula  Specimens: * No specimens in log *  Findings:   None.  Complications: None.  Implants: * No implants in log *    Stable to PACU    Charly Cortez MD PhD  CRS Fellow

## 2021-03-11 NOTE — DISCHARGE INSTRUCTIONS
Same Day Surgery Discharge Instructions for  Sedation and General Anesthesia       It's not unusual to feel dizzy, light-headed or faint for up to 24 hours after surgery or while taking pain medication.  If you have these symptoms: sit for a few minutes before standing and have someone assist you when you get up to walk or use the bathroom.      You should rest and relax for the next 24 hours. We recommend you make arrangements to have an adult stay with you for at least 24 hours after your discharge.  Avoid hazardous and strenuous activity.      DO NOT DRIVE any vehicle or operate mechanical equipment for 24 hours following the end of your surgery.  Even though you may feel normal, your reactions may be affected by the medication you have received.      Do not drink alcoholic beverages for 24 hours following surgery.       Slowly progress to your regular diet as you feel able. It's not unusual to feel nauseated and/or vomit after receiving anesthesia.  If you develop these symptoms, drink clear liquids (apple juice, ginger ale, broth, 7-up, etc. ) until you feel better.  If your nausea and vomiting persists for 24 hours, please notify your surgeon.        All narcotic pain medications, along with inactivity and anesthesia, can cause constipation. Drinking plenty of liquids and increasing fiber intake will help.      For any questions of a medical nature, call your surgeon.      Do not make important decisions for 24 hours.      If you had general anesthesia, you may have a sore throat for a couple of days related to the breathing tube used during surgery.  You may use Cepacol lozenges to help with this discomfort.  If it worsens or if you develop a fever, contact your surgeon.       If you feel your pain is not well managed with the pain medications prescribed by your surgeon, please contact your surgeon's office to let them know so they can address your concerns.       CoVid 19 Information    We want to give you  information regarding Covid. Please consult your primary care provider with any questions you might have.     Patient who have symptoms (cough, fever, or shortness of breath), need to isolate for 7 days from when symptoms started OR 72 hours after fever resolves (without fever reducing medications) AND improvement of respiratory symptoms (whichever is longer).      Isolate yourself at home (in own room/own bathroom if possible)    Do Not allow any visitors    Do Not go to work or school    Do Not go to [employer],  centers, shopping, or other public places.    Do Not shake hands.    Avoid close and intimate contact with others (hugging, kissing).    Follow CDC recommendations for household cleaning of frequently touched services.     After the initial 7 days, continue to isolate yourself from household members as much as possible. To continue decrease the risk of community spread and exposure, you and any members of your household should limit activities in public for 14 days after starting home isolation.     You can reference the following CDC link for helpful home isolation/care tips:  https://www.cdc.gov/coronavirus/2019-ncov/downloads/10Things.pdf    Protect Others:    Cover Your Mouth and Nose with a mask, disposable tissue or wash cloth to avoid spreading germs to others.    Wash your hands and face frequently with soap and water    Call Your Primary Doctor If: Breathing difficulty develops or you become worse.    For more information about COVID19 and options for caring for yourself at home, please visit the CDC website at https://www.cdc.gov/coronavirus/2019-ncov/about/steps-when-sick.html  For more options for care at [facility], please visit our website at [email or web address]      Discharge Instructions Following Anorectal Surgery  [facility]  [phone]  Medication:     You may be prescribed a narcotic pain relievers such as: Vicodin,  Percocet, and Tylenol # 3.  You should reduce your use of these medications as your pain improves.  You may be prescribed an anal ointment (such as Americ[place], Tronothane, or Xylocaine). Apply the ointment to the anal area with your finger, then cover the area with cotton or gauze.  Stool softeners (Miralax) are to be taken in a glass of water once in the morning with increased water intake throughout the day.   Bowel function:   It is important to have soft bowel movements at least every other day and to keep your stool soft. Constipation and/or diarrhea can make the pain worse and must be avoided.   Constipation:  You should have a bowel movement at least every other day.  If two days pass without one, take one tablespoon of milk of magnesia; if there is no result, repeat this dose in six hours.   Diarrhea:  Diarrhea, usually caused by the overuse of laxatives, is also a concern. If you have more than three watery bowel movements during a 24 hour period, stop taking milk of magnesia or laxatives.  If diarrhea persists, call your doctor.   Bathing:  After bowel movements, use a wet washcloth, toilet paper or cotton to clean yourself.  If possible take a sitz bath or tub bath immediately. Baths should last between 10-15 minutes with the water as warm as you can comfortably tolerate. Try to take at least three sitz baths (or showers with hand-held sprayer) every day.   Discharge/Infection:  Bloody discharge after bowel movements is normal and may last 2 to 4 weeks after your surgery. However, if you have bleeding between bowel movements that doesn't stop, call the office.  Urination:  If you have trouble urinating, do so while sitting in a tub of water, or run the water faucet while sitting on the toilet. If you are unable to urinate 6-8 hours after surgery, call the office.  Diet:  A high fiber diet, including at least four servings of fruits or vegetables daily, will help to keep your bowel movements regular and  soft. It is important to drink six to eight glasses of water or juice everyday when using fiber products.   Activity:    Activity as tolerated. After some surgeries, you may be told not to perform any lifting (more than 10 pounds) for several weeks after surgery.    Call the office if you have:  Fever greater than 101   Chills   Foul-smelling drainage   Nausea and vomiting   Diarrhea - greater than 3 water stools in 24 hours   Constipation - no bowel movement for 3 days   Severe bleeding that does not stop soon after a bowel movement   Problems with the incision, including increased pain, swelling, redness, or drainage.  Difficulty urinating              **If you have questions or concerns about your procedure,  call Dr. Silveira at [phone]**    Today you were given 975 mg of Tylenol at 12:00pm. The recommended daily maximum dose is 4000 mg.

## 2021-03-11 NOTE — ANESTHESIA CARE TRANSFER NOTE
Patient: Cherry Kim    Procedure(s):  EXAM UNDER ANESTHESIA  PLACEMENT OF SETON    Diagnosis: Anal fistula [K60.3]  Diagnosis Additional Information: No value filed.    Anesthesia Type:   MAC     Note:    Oropharynx: oropharynx clear of all foreign objects and spontaneously breathing  Level of Consciousness: awake  Oxygen Supplementation: face mask  Level of Supplemental Oxygen (L/min / FiO2): 6  Independent Airway: airway patency satisfactory and stable  Dentition: dentition unchanged  Vital Signs Stable: post-procedure vital signs reviewed and stable  Report to RN Given: handoff report given  Patient transferred to: Phase II    Handoff Report: Identifed the Patient, Identified the Reponsible Provider, Reviewed the pertinent medical history, Discussed the surgical course, Reviewed Intra-OP anesthesia mangement and issues during anesthesia, Set expectations for post-procedure period and Allowed opportunity for questions and acknowledgement of understanding      Vitals: (Last set prior to Anesthesia Care Transfer)  CRNA VITALS  3/11/2021 1329 - 3/11/2021 1404      3/11/2021             Resp Rate (set):  10        Electronically Signed By: SHEN Nugent CRNA  March 11, 2021  2:04 PM

## 2021-03-11 NOTE — ANESTHESIA POSTPROCEDURE EVALUATION
Patient: Cherry Kim    Procedure(s):  EXAM UNDER ANESTHESIA  PLACEMENT OF SETON    Diagnosis:Anal fistula [K60.3]  Diagnosis Additional Information: No value filed.    Anesthesia Type:  MAC    Note:  Disposition: Outpatient   Postop Pain Control: Uneventful            Sign Out: Well controlled pain   PONV: No   Neuro/Psych: Uneventful            Sign Out: Acceptable/Baseline neuro status   Airway/Respiratory: Uneventful            Sign Out: Acceptable/Baseline resp. status   CV/Hemodynamics: Uneventful            Sign Out: Acceptable CV status   Other NRE: NONE   DID A NON-ROUTINE EVENT OCCUR? No         Last vitals:  Vitals:    03/11/21 1439 03/11/21 1445 03/11/21 1540   BP:  127/72 128/75   Pulse:  75    Resp: 16 16 16   Temp:      SpO2:  98% 95%       Last vitals prior to Anesthesia Care Transfer:  CRNA VITALS  3/11/2021 1329 - 3/11/2021 1429      3/11/2021             Resp Rate (set):  10          Electronically Signed By: Nadeem Dwyer DO  March 11, 2021  4:01 PM

## 2021-03-15 NOTE — OP NOTE
Procedure Date: 03/11/2021      PREOPERATIVE DIAGNOSIS:  Recurrent perianal abscess.      POSTOPERATIVE DIAGNOSIS:  Anal fistula.      PROCEDURES PERFORMED:  Examination under anesthesia and placement of seton.      SURGEON:  Elaine Silveira MD      ASSISTANT:  Charly Cortez MD      ANESTHESIA:  MAC.      INDICATIONS FOR PROCEDURE:  The patient is a 60-year-old woman who over a period of time has developed recurrent perianal abscesses.  Early in her course, a decision had been made just to monitor her symptoms; however, she continued to have recurrence.  Therefore, it seemed likely that this represents an anal fistula and that more definitive treatment would be appropriate.  The plan for an examination under anesthesia with possible drainage of an abscess and placement of a seton with subsequent definitive care of the fistula and a second procedure was outlined with the patient.  All of her questions were answered.  The nature of the procedure and the risks were reviewed.  She understands and wishes to proceed.      DESCRIPTION OF PROCEDURE:  After an outpatient prep, the patient was brought to the operating room.  Her legs were placed in pneumatic compression stockings.  She was then brought to the OR and placed in the prone jackknife position on the operating table.  After receiving sedation from the Anesthesia team, her buttocks were taped apart for exposure.  The area was prepped and draped in the usual sterile manner.  A timeout was performed and everyone present in the operating room agreed to the planned procedure.  Inspection externally revealed a dimple in the posterior midline.  There was no evidence of granulation tissue that was visible.  Digital palpation revealed line of induration in the posterior midline from the anal canal to this dimple.  An anoscope was introduced.  A crypt hook was introduced into what appeared to be an internal opening.  This easily passed to the dimple.  A very small amount of  epithelialized area overlying the probe was opened.  There was obvious granulation tissue in this tract.  The external opening was widened, as there was a small subcutaneous cavity.  The 2-0 silk tie was then passed through this tract, followed by a red vessel loop.  The vessel loop was tied in place with 2-0 silk ties so that it would function as a seton.  Inspection of the anal canal revealed no other abnormalities suggesting a high blind extension or any subcutaneous extension.  The fistula appeared to involve primarily an internal sphincter and approximately a third of the length of the internal sphincter.  She will require more imaging before a decision can be made about the appropriate definitive care.  Hemostasis was checked for and felt to be adequate.  A pad was placed over the anal canal.  All sponge, blade and needle counts were reported as correct x2.  The patient was transferred to the recovery room in stable condition.         ELAINE HANSEN MD             D: 03/15/2021   T: 03/15/2021   MT: JON      Name:     MARIAN MCLAUGHLIN   MRN:      [id number]        Account:        [id number]   :      1960           Procedure Date: 2021      Document: [id number]       cc: Elaine Shannon MD

## 2021-03-24 ENCOUNTER — DOCUMENTATION ONLY (OUTPATIENT)
Dept: OTHER | Facility: CLINIC | Age: 61
End: 2021-03-24

## 2021-03-24 PROBLEM — Z71.89 ADVANCED DIRECTIVES, COUNSELING/DISCUSSION: Chronic | Status: RESOLVED | Noted: 2017-04-14 | Resolved: 2021-03-24

## 2021-03-26 ENCOUNTER — IMMUNIZATION (OUTPATIENT)
Dept: NURSING | Facility: CLINIC | Age: 61
End: 2021-03-26
Payer: COMMERCIAL

## 2021-03-26 PROCEDURE — 91300 PR COVID VAC PFIZER DIL RECON 30 MCG/0.3 ML IM: CPT

## 2021-03-26 PROCEDURE — 0001A PR COVID VAC PFIZER DIL RECON 30 MCG/0.3 ML IM: CPT

## 2021-04-07 ENCOUNTER — TRANSFERRED RECORDS (OUTPATIENT)
Dept: HEALTH INFORMATION MANAGEMENT | Facility: CLINIC | Age: 61
End: 2021-04-07

## 2021-04-16 ENCOUNTER — IMMUNIZATION (OUTPATIENT)
Dept: NURSING | Facility: CLINIC | Age: 61
End: 2021-04-16
Attending: INTERNAL MEDICINE
Payer: COMMERCIAL

## 2021-04-16 PROCEDURE — 91300 PR COVID VAC PFIZER DIL RECON 30 MCG/0.3 ML IM: CPT

## 2021-04-16 PROCEDURE — 0002A PR COVID VAC PFIZER DIL RECON 30 MCG/0.3 ML IM: CPT

## 2021-05-19 ENCOUNTER — MYC MEDICAL ADVICE (OUTPATIENT)
Dept: INTERNAL MEDICINE | Facility: CLINIC | Age: 61
End: 2021-05-19

## 2021-06-29 ENCOUNTER — TRANSFERRED RECORDS (OUTPATIENT)
Dept: HEALTH INFORMATION MANAGEMENT | Facility: CLINIC | Age: 61
End: 2021-06-29

## 2021-09-18 ENCOUNTER — HEALTH MAINTENANCE LETTER (OUTPATIENT)
Age: 61
End: 2021-09-18

## 2021-12-22 DIAGNOSIS — B00.9 HSV INFECTION: ICD-10-CM

## 2021-12-22 RX ORDER — VALACYCLOVIR HYDROCHLORIDE 500 MG/1
TABLET, FILM COATED ORAL
Qty: 90 TABLET | Refills: 0 | Status: SHIPPED | OUTPATIENT
Start: 2021-12-22 | End: 2021-12-29

## 2021-12-22 NOTE — TELEPHONE ENCOUNTER
"Requested Prescriptions   Pending Prescriptions Disp Refills     valACYclovir (VALTREX) 500 MG tablet [Pharmacy Med Name: VALACYCLOVIR  MG TABLET] 90 tablet 3     Sig: TAKE 1 TABLET BY MOUTH EVERY DAY       Antivirals for Herpes Protocol Passed - 12/22/2021  9:29 AM        Passed - Patient is age 12 or older        Passed - Recent (12 mo) or future (30 days) visit within the authorizing provider's specialty     Patient has had an office visit with the authorizing provider or a provider within the authorizing providers department within the previous 12 mos or has a future within next 30 days. See \"Patient Info\" tab in inbasket, or \"Choose Columns\" in Meds & Orders section of the refill encounter.              Passed - Medication is active on med list        Passed - Normal serum creatinine on file in past 12 months     Recent Labs   Lab Test 03/02/21  0929   CR 0.82       Ok to refill medication if creatinine is low             Last Written Prescription Date:  12/22/20  Last Fill Quantity: 90,  # refills: 3   Last office visit: 12/22/2020 with prescribing provider:  [facility]   Future Office Visit:   Next 5 appointments (look out 90 days)    Dec 30, 2021  1:15 PM  Screening Mammogram with [facility] [facility] ([facility] ) [address] [place] [id number] [phone]         Medication is being filled for 1 time refill only due to:  Patient needs to be seen because it has been more than one year since last visit.  Appointment scheduled.  Mary Baca RN on 12/22/2021 at 9:47 AM          "

## 2021-12-29 ENCOUNTER — OFFICE VISIT (OUTPATIENT)
Dept: OBGYN | Facility: CLINIC | Age: 61
End: 2021-12-29
Payer: COMMERCIAL

## 2021-12-29 VITALS
DIASTOLIC BLOOD PRESSURE: 68 MMHG | WEIGHT: 212 LBS | HEIGHT: 64 IN | SYSTOLIC BLOOD PRESSURE: 122 MMHG | BODY MASS INDEX: 36.19 KG/M2

## 2021-12-29 DIAGNOSIS — N94.12 DEEP DYSPAREUNIA IN FEMALE: Primary | ICD-10-CM

## 2021-12-29 DIAGNOSIS — B00.9 HSV INFECTION: ICD-10-CM

## 2021-12-29 PROCEDURE — 99396 PREV VISIT EST AGE 40-64: CPT | Performed by: OBSTETRICS & GYNECOLOGY

## 2021-12-29 RX ORDER — VALACYCLOVIR HYDROCHLORIDE 500 MG/1
500 TABLET, FILM COATED ORAL DAILY
Qty: 90 TABLET | Refills: 3 | Status: SHIPPED | OUTPATIENT
Start: 2021-12-29 | End: 2023-03-05

## 2021-12-29 RX ORDER — ESTRADIOL 0.1 MG/G
2 CREAM VAGINAL
Qty: 42.5 G | Refills: 3 | Status: SHIPPED | OUTPATIENT
Start: 2021-12-29 | End: 2023-03-13

## 2021-12-29 ASSESSMENT — ANXIETY QUESTIONNAIRES
2. NOT BEING ABLE TO STOP OR CONTROL WORRYING: NOT AT ALL
1. FEELING NERVOUS, ANXIOUS, OR ON EDGE: NOT AT ALL
GAD7 TOTAL SCORE: 0
IF YOU CHECKED OFF ANY PROBLEMS ON THIS QUESTIONNAIRE, HOW DIFFICULT HAVE THESE PROBLEMS MADE IT FOR YOU TO DO YOUR WORK, TAKE CARE OF THINGS AT HOME, OR GET ALONG WITH OTHER PEOPLE: NOT DIFFICULT AT ALL
3. WORRYING TOO MUCH ABOUT DIFFERENT THINGS: NOT AT ALL
5. BEING SO RESTLESS THAT IT IS HARD TO SIT STILL: NOT AT ALL
7. FEELING AFRAID AS IF SOMETHING AWFUL MIGHT HAPPEN: NOT AT ALL
6. BECOMING EASILY ANNOYED OR IRRITABLE: NOT AT ALL

## 2021-12-29 ASSESSMENT — PATIENT HEALTH QUESTIONNAIRE - PHQ9
SUM OF ALL RESPONSES TO PHQ QUESTIONS 1-9: 0
5. POOR APPETITE OR OVEREATING: NOT AT ALL

## 2021-12-29 ASSESSMENT — MIFFLIN-ST. JEOR: SCORE: 1516.63

## 2021-12-29 NOTE — PROGRESS NOTES
Cherry is a 61 year old  female who presents for annual exam.     Besides routine health maintenance, she has no other health concerns today .    HPI:    She is up to date on Pap smear    The patient's PCP is Dr Krish Shannon MD.    History of anal fistula, history of recurrent carlos-anal abscess.  She has tried pelvic floor therapy for awhile.  She has tried a tablet for atrophy.  She has tried acupuncture as well.    GYNECOLOGIC HISTORY:    Patient's last menstrual period was 2007.  She  reports that she has never smoked. She has never used smokeless tobacco.  Patient is sexually active.  STD testing offered?  Declined     Last PHQ-9 score on record =   PHQ-9 SCORE 2021   PHQ-9 Total Score 0     Last GAD7 score on record =   GAIL-7 SCORE 2021   Total Score 0     Alcohol Score = 0    HEALTH MAINTENANCE:  Cholesterol: followed by primary care provider  Recent Labs   Lab Test 21  0929 19  0859 16  0842 14  0849   CHOL 156 166   < > 154   HDL 46* 43*   < > 51   LDL 90 104*   < > 92   TRIG 100 95   < > 57   CHOLHDLRATIO  --   --   --  3.0    < > = values in this interval not displayed.     TSH   Date Value Ref Range Status   2021 2.61 0.40 - 4.00 mU/L Final     Last Mammo: 18, Result: Normal, Next Mammo: scheduled tomorrow,   Pap:   Lab Results   Component Value Date    PAP NIL, NEG-HPV 2018    PAP NIL 01/15/2015    PAP NIL 2011   Colonoscopy:  19, Result: Normal, Next Colonoscopy: 10 years.  Dexa:  18  Lumbar Spine (L1-L4)  T-score:  0.5  Left Femoral Neck  T-score:  0.6  Right Femoral Neck  T-score:  0.5  Health maintenance updated:  yes    HISTORY:  OB History    Para Term  AB Living   0 0 0 0 0 0   SAB IAB Ectopic Multiple Live Births   0 0 0 0 0       Patient Active Problem List   Diagnosis     Hypothyroidism     Proteinuria     Essential hypertension, benign     Family history of malignant neoplasm of breast      Crustacean allergy     Obesity     Other specified disorder of rectum and anus     Hemorrhoids     Health Care Home     Connective tissue disorder (H)     Impaired fasting glucose     Hyperlipidemia LDL goal <130     Hallux abducto valgus     Somatic dysfunction of pelvis region     Pain in the coccyx     Somatic dysfunction of lumbar region     Lumbago     Somatic dysfunction of sacral region     Thoracic segment dysfunction     Morbid obesity (H)     Past Surgical History:   Procedure Laterality Date     COLONOSCOPY  2007     COSMETIC SURGERY  1978     CRYOTHERAPY, CERVICAL      1980s     EXAM UNDER ANESTHESIA RECTUM N/A 3/11/2021    Procedure: EXAM UNDER ANESTHESIA;  Surgeon: Elaine Silveira MD;  Location: [facility] OR     HC RECONSTR NOSE+BULL SEPTAL REPAIR            PLACEMENT OF SETON RECTUM N/A 3/11/2021    Procedure: PLACEMENT OF SETON;  Surgeon: Elaine Silveira MD;  Location: [facility] OR     ZZC NONSPECIFIC PROCEDURE      Tosillectomy     ZZC NONSPECIFIC PROCEDURE      rectal abcess      Social History     Tobacco Use     Smoking status: Never Smoker     Smokeless tobacco: Never Used   Substance Use Topics     Alcohol use: Yes     Comment: 1/ day      Problem (# of Occurrences) Relation (Name,Age of Onset)    Breast Cancer (1) Mother (Lala Rojas): At 55 yrs    C.A.D. (1) Mother (Lala Rojas):  of CHF,  at 58yrs of ? MI    Cancer (6) Father (Sr Parveen.): Lung Cancer, Brother: brain cancer  , Maternal Aunt: vaginal, Maternal Aunt: cervical, Maternal Aunt: Rectal cancer, Maternal Grandfather: Stomach cancer in his 40s    Connective Tissue Disorder (1) Mother (Lala Rojas): Lupus    Diabetes (1) Mother (Lala Rojas): Mid forties    Hypertension (1) Mother (Lala Rojas)    LUNG DISEASE (1) Brother: COPD- lung transplant    Mitral valve prolapse (1) Sister: possible    No Known Problems (4) Brother, Maternal Grandmother, Paternal Grandmother, Other    Other Cancer (2) Father (Parveen  "Sr.), Brother (ParveenJr.)    Respiratory (1) Father (Sr Parveen.): d. 79 from complications of pneumonia    Thyroid Disease (1) Father (Sr Parveen.)            Current Outpatient Medications   Medication Sig     CO Q-10 100 MG OR CAPS 1 daily     EPINEPHrine (EPIPEN/ADRENACLICK/OR ANY BX GENERIC EQUIV) 0.3 MG/0.3ML injection 2-pack Inject 0.3 mLs (0.3 mg) into the muscle as needed for anaphylaxis     HYDROXYCHLOROQUINE SULFATE 200 MG PO TABS 1 TABLET DAILY     Iodoquinol-HC (HYDROCORTISONE-IODOQUINOL) 1-1 % CREA Externally apply  topically daily as needed.     levothyroxine (SYNTHROID/LEVOTHROID) 137 MCG tablet Take 1 tablet (137 mcg) by mouth daily     metoprolol tartrate (LOPRESSOR) 50 MG tablet TAKE 1 TABLET BY MOUTH TWICE A DAY     naproxen (NAPROSYN) 500 MG tablet TK 1 OR 2 TS PO QD PRN     simvastatin (ZOCOR) 40 MG tablet TAKE 1 TABLET BY MOUTH EVERYDAY AT BEDTIME     spironolactone-HCTZ (ALDACTAZIDE) 25-25 MG tablet Take 1 tablet by mouth daily     Vaginal Lubricant (REPLENS) GEL Place vaginally as needed     valACYclovir (VALTREX) 500 MG tablet TAKE 1 TABLET BY MOUTH EVERY DAY     No current facility-administered medications for this visit.     Allergies   Allergen Reactions     Lisinopril Anaphylaxis     Ciprofloxacin Diarrhea     Codeine      severe nausea     Codeine Nausea     Lisinopril Anaphylaxis     angioedema     Shellfish Allergy Nausea and Vomiting     Hives on neck     Sulfa Drugs      hives  Other reaction(s): Hives       Past medical, surgical, social and family histories were reviewed and updated in EPIC.    ROS:   12 point review of systems negative other than symptoms noted below or in the HPI.  Gastrointestinal: rectal pain  Genitourinary: Painful [place] and Pelvic Pain  No urinary frequency or dysuria, bladder or kidney problems    EXAM:  /68   Ht 1.626 m (5' 4\")   Wt 96.2 kg (212 lb)   LMP 06/27/2007   BMI 36.39 kg/m     BMI: Body mass index is 36.39 kg/m .    PHYSICAL " EXAM:  Constitutional:   Appearance: Well nourished, well developed, alert, in no acute distress    Chest:  Respiratory Effort:  Breathing unlabored  Cardiovascular:    Heart: Auscultation:  Regular rate, normal rhythm, no murmurs present  Breasts: Inspection of Breasts:  No lymphadenopathy present., Palpation of Breasts and Axillae:  No masses present on palpation, no breast tenderness., Axillary Lymph Nodes:  No lymphadenopathy present. and No nodularity, asymmetry or nipple discharge bilaterally.    Lymphatic: Lymph Nodes:  No other lymphadenopathy present  Skin:  General Inspection:  No rashes present, no lesions present, no areas of  discoloration  Neurologic:    Mental Status:  Oriented X3.  Normal strength and tone, sensory exam                grossly normal, mentation intact and speech normal.    Psychiatric:   Mentation appears normal and affect normal/bright.         Pelvic Exam:  External Genitalia:     Normal appearance for age, no discharge present, no tenderness present, no inflammatory lesions present, color normal  Vagina:     Normal vaginal vault without central or paravaginal defects, no discharge present, no inflammatory lesions present, no masses present. Vaginal atrophy. Levator spasm  Bladder:     Nontender to palpation  Urethra:   Urethral Body:  Urethra palpation normal, urethra structural support normal   Urethral Meatus:  No erythema or lesions present  Cervix:     Appearance healthy, no lesions present, nontender to palpation, no bleeding present  Uterus:     Uterus: firm, normal sized and nontender,  Adnexa:     No adnexal tenderness present, no adnexal masses present  Perineum:     Perineum within normal limits, no evidence of trauma, no rashes or skin lesions present  Anus:     Anus within normal limits, no hemorrhoids present  Inguinal Lymph Nodes:     No lymphadenopathy present  Pubic Hair:     Normal pubic hair distribution for age  Genitalia and Groin:     No rashes present, no  lesions present, no areas of discoloration, no masses present      COUNSELING:   Reviewed preventive health counseling, as reflected in patient instructions    BMI: Body mass index is 36.39 kg/m .      ASSESSMENT:  61 year old female with satisfactory annual exam.    ICD-10-CM    1. HSV infection  B00.9        PLAN:  1. Colonoscopy is up to date  2. Mammogram tomorrow  3. Not due for pap   4. PT referral sent  5. Discussed estrogen cream and consider osphena if that doesn't work    Ewelina Henao MD

## 2021-12-30 ENCOUNTER — ANCILLARY PROCEDURE (OUTPATIENT)
Dept: MAMMOGRAPHY | Facility: CLINIC | Age: 61
End: 2021-12-30
Attending: OBSTETRICS & GYNECOLOGY
Payer: COMMERCIAL

## 2021-12-30 DIAGNOSIS — Z12.31 VISIT FOR SCREENING MAMMOGRAM: ICD-10-CM

## 2021-12-30 PROCEDURE — 77067 SCR MAMMO BI INCL CAD: CPT | Mod: TC | Performed by: RADIOLOGY

## 2021-12-30 PROCEDURE — 77063 BREAST TOMOSYNTHESIS BI: CPT | Mod: TC | Performed by: RADIOLOGY

## 2021-12-30 ASSESSMENT — ANXIETY QUESTIONNAIRES: GAD7 TOTAL SCORE: 0

## 2022-01-04 ENCOUNTER — THERAPY VISIT (OUTPATIENT)
Dept: PHYSICAL THERAPY | Facility: CLINIC | Age: 62
End: 2022-01-04
Attending: OBSTETRICS & GYNECOLOGY
Payer: COMMERCIAL

## 2022-01-04 DIAGNOSIS — N94.10 DYSPAREUNIA IN FEMALE: ICD-10-CM

## 2022-01-04 DIAGNOSIS — M62.89 PELVIC FLOOR DYSFUNCTION: ICD-10-CM

## 2022-01-04 PROCEDURE — 97161 PT EVAL LOW COMPLEX 20 MIN: CPT | Mod: GP | Performed by: PHYSICAL THERAPIST

## 2022-01-04 PROCEDURE — 97112 NEUROMUSCULAR REEDUCATION: CPT | Mod: GP | Performed by: PHYSICAL THERAPIST

## 2022-01-04 PROCEDURE — 97530 THERAPEUTIC ACTIVITIES: CPT | Mod: GP | Performed by: PHYSICAL THERAPIST

## 2022-01-04 NOTE — PROGRESS NOTES
Physical Therapy Initial Evaluation  Subjective:  The history is provided by the EMS personnel.                       Objective:  System    Physical Exam    General     ROS     Physical Therapy Initial Examination/Evaluation January 4, 2022   Cherry Kim is a 61 year old female referred to physical therapy by Dr. Ewelina Henao for treatment of Deep Dyspareunia in female  with Precautions/Restrictions/MD instructions E&T    Therapist Assessment:   Clinical Impression: Pt presents to PT with primary complaint of pelvic pain.  Per clinical examination, pt with increased tone in pelvic floor muscles and increased time needed for full relaxation of PF.  Pt will benefit from skilled physical therapy to address increased tone of pelvic floor, improve coordination of muscles, and education on improving body mechanics and habits for optimal bladder & bowel health.        Subjective: Pt had fistula surgery in March of 2021 where she had a seton placement. Pt reports her main symptoms are painful intercourse with deep penetration. Pt has pain along posterior hamstrings and throughout buttocks and vaginally. She continues to need to have bowel movements frequently. Pt has a wand and is currently using it 2x/day.   DOI/onset: MD order: 12/29/2021   Mechanism of injury: Anal fistula   DOS NA    Related PMH: connective tissue disease, just started estrogen cream  Previous treatment: Pelvic PT, accupuncture    Imaging: NA   Chief Complaint: Pelvic pain    Pain: rest 4 /10, activity 5/10  Described as: did not describe  Alleviated by: stretching Exacerbated by: intercourse, bowel movements, sitting  Progression of symptoms since initial onset: staying the same or worsening  Time of day when pain is worse: none noted e   Sleeping: wakes 1-2x/night to urinate   Social history:    Occupation: [employer] Job duties: currently working from home    Current HEP/exercise regimen: walking    Patient's  goals are decrease current pain and pain with intercourse   Other pertinent PMH:  Htn, overweight, thyroid problems General health as reported by patient: Good    Return to MD: prn; planning to follow-up with another colorectal surgeon as hers recently retired      Urination:  Do you leak on the way to the bathroom or with a strong urge to void? No    Do you leak with cough,sneeze, jumping, running?No   Any other activities that cause leaking? No   Do you have triggers that make you feel you can't wait to go to the bathroom? No    Type of pad and number used per day? pantyliner-1     How long can you delay the need to urinate? 30 mins   How many times do you get up to urinate at night? 1-2    Can you stop the flow of urine when on the toilet? Yes  Is the volume of urine passed usually: medium. (8sec rule=  250ml with average bladder storing  400-600ml)    Do you strain to pass urine? No  Do you have a slow or hesitant urinary stream? No  Do you have difficulty initiating the urine stream? No  Is urination painful?  No    How many bladder infections have you had in last 12 months? 0    Fluid intake(one glass is 8oz or one cup) 32 oz/day, 6-12 oz caffinated oz/day  4 alcohol ozday.    Bowel habits:  Frequency of bowel movements? 4 times a day  Consistancy of stool? soft formed  Do you ignore the urge to defecate? No  Do you strain to pass stool? No    Pelvic Pain:  Do you have any pelvic pain with intercourse, exams, use of tampons? Yes, after bowel movements, prolonged sitting or standing   Is initial penetration during intercourse painful? No  Is deeper penetration painful? Yes  Do you use lubricant?   Yes What kind? KY jelly       Given birth? No     Are you sexually active?Yes  Have you ever been worried for your physical safety? No  Any abdominal or pelvic surgeries?  No  Are you having any regular exercise? Yes  Have you practiced the PF(kegel) exercises for 4 or more weeks? No    Lumbar ROM  Flexion: WNL   Ext:  WNL  Sidebend: WNL  Rotation: WNL    Hip MMT 1/4/2022 Left Right   Hip Flexion  4+/5 4/5   Hip Abduction  4+/5 4/5   Hip Extension  4/5 4/5         MUSCLE PERFORMANCE    Baseline PF tone:hyper  PF Tone with cough: hyper  Valsalva: not tested  PF Response quality: moderate  PF Power: Center: 2-3/5   Endurance: Maximum contraction in seconds: 10 seconds  # of endurance contractions before fatigue: NT  Quick contraction repetitions prior to fatigue: 5.  Specificity/accessory muscles: Abdominals, glutes, adductors    PALPATION: Reproduction of symptoms with digital evaluations        Assessment/Plan:    Patient is a 61 year old female with pelvic complaints.    Patient has the following significant findings with corresponding treatment plan.                Diagnosis 1:  Dyspareunia in female  Pain -  manual therapy, self management, education and home program  Decreased ROM/flexibility - manual therapy, therapeutic exercise, therapeutic activity and home program  Impaired muscle performance - neuro re-education and home program  Decreased function - therapeutic activities and home program    Therapy Evaluation Codes:   1) History comprised of:   Personal factors that impact the plan of care:      Past/current experiences and Time since onset of symptoms.    Comorbidity factors that impact the plan of care are:      High blood pressure, Overweight and thyroid problems, connective tissue disease.     Medications impacting care: Anti-inflammatory, High blood pressure and thyroid.  2) Examination of Body Systems comprised of:   Body structures and functions that impact the plan of care:      Pelvis.   Activity limitations that impact the plan of care are:      Sitting, Standing and intercourse.  3) Clinical presentation characteristics are:   Stable/Uncomplicated.  4) Decision-Making    Low complexity using standardized patient assessment instrument and/or measureable assessment of functional outcome.  Cumulative Therapy  Evaluation is: Low complexity.    Previous and current functional limitations:  (See Goal Flow Sheet for this information)    Short term and Long term goals: (See Goal Flow Sheet for this information)     Communication ability:  Patient appears to be able to clearly communicate and understand verbal and written communication and follow directions correctly.  Treatment Explanation - The following has been discussed with the patient:   RX ordered/plan of care  Anticipated outcomes  Possible risks and side effects  This patient would benefit from PT intervention to resume normal activities.   Rehab potential is good.    Frequency:  1 X week, once daily  Duration:  for 6 weeks  Discharge Plan:  Achieve all LTG.  Independent in home treatment program.  Reach maximal therapeutic benefit.    Please refer to the daily flowsheet for treatment today, total treatment time and time spent performing 1:1 timed codes.

## 2022-01-19 ENCOUNTER — TRANSFERRED RECORDS (OUTPATIENT)
Dept: HEALTH INFORMATION MANAGEMENT | Facility: CLINIC | Age: 62
End: 2022-01-19
Payer: COMMERCIAL

## 2022-01-19 LAB
ALT SERPL-CCNC: 26 IU/L (ref 5–35)
AST SERPL-CCNC: 24 U/L (ref 5–34)
CREATININE (EXTERNAL): 0.67 MG/DL (ref 0.5–1.3)

## 2022-01-27 ENCOUNTER — TRANSFERRED RECORDS (OUTPATIENT)
Dept: HEALTH INFORMATION MANAGEMENT | Facility: CLINIC | Age: 62
End: 2022-01-27
Payer: COMMERCIAL

## 2022-03-01 ENCOUNTER — THERAPY VISIT (OUTPATIENT)
Dept: PHYSICAL THERAPY | Facility: CLINIC | Age: 62
End: 2022-03-01
Payer: COMMERCIAL

## 2022-03-01 DIAGNOSIS — M62.89 PELVIC FLOOR DYSFUNCTION: ICD-10-CM

## 2022-03-01 DIAGNOSIS — N94.10 DYSPAREUNIA IN FEMALE: ICD-10-CM

## 2022-03-01 PROCEDURE — 97110 THERAPEUTIC EXERCISES: CPT | Mod: GP | Performed by: PHYSICAL THERAPIST

## 2022-03-01 PROCEDURE — 97530 THERAPEUTIC ACTIVITIES: CPT | Mod: GP | Performed by: PHYSICAL THERAPIST

## 2022-03-01 PROCEDURE — 97112 NEUROMUSCULAR REEDUCATION: CPT | Mod: GP | Performed by: PHYSICAL THERAPIST

## 2022-03-01 NOTE — PROGRESS NOTES
Subjective:  HPI  Physical Exam                    Objective:  System    Physical Exam    General     ROS      PROGRESS  REPORT    Progress reporting period is from 1/04/2022 to 3/1/2022       SUBJECTIVE  Subjective: Pt reports continued pain. She has tried the estrogen but is having some digestive issues and wondering if it could be related to that so wants to decrease estrogen usage. She is using therawand 1-2 x/day and does feel like it helps for the short-term. Pt does think she should follow-up with colorectal MD.     Initial Pain level: 5/10.   Changes in function:  Yes (See Goal flowsheet attached for changes in current functional level)-reports that she is able to sit longer than she could before   Adverse reaction to treatment or activity: None    OBJECTIVE  Changes noted in objective findings:      MUSCLE PERFORMANCE 3/1/2022      Baseline PF tone:hyper  PF Response quality: moderate  PF Power: Center: 2-3/5   Endurance: Maximum contraction in seconds: 10 seconds  # of endurance contractions before fatigue: NT  Quick contraction repetitions prior to fatigue: 5.  Specificity/accessory muscles: good isolation     MUSCLE PERFORMANCE 1/04/2022     Baseline PF tone:hyper  PF Tone with cough: hyper  Valsalva: not tested  PF Response quality: moderate  PF Power: Center: 2-3/5   Endurance: Maximum contraction in seconds: 10 seconds  # of endurance contractions before fatigue: NT  Quick contraction repetitions prior to fatigue: 5.  Specificity/accessory muscles: Abdominals, glutes, adductors        Objective: Discussed recommendation for follow-up with MD. Improved control and isolation of pelvic floor noted. Progressed PF exercises with continued focus on relaxation. Progressed proximal mm strengthening progam. Pt also educated on trying to decrease frequency of therawand usage.      ASSESSMENT/PLAN  Updated problem list and treatment plan: Diagnosis 1:  Dyspareunia in female  Pain -  manual therapy, self  management, education and home program  Decreased ROM/flexibility - manual therapy, therapeutic exercise, therapeutic activity and home program  Decreased strength - therapeutic exercise, therapeutic activities and home program  Impaired muscle performance - neuro re-education and home program  Decreased function - therapeutic activities and home program  STG/LTGs have been met or progress has been made towards goals:  Yes (See Goal flow sheet completed today.)  Assessment of Progress: The patient's condition has potential to improve.  Self Management Plans:  Patient has been instructed in a home treatment program.  I have re-evaluated this patient and find that the nature, scope, duration and intensity of the therapy is appropriate for the medical condition of the patient.  Cherry continues to require the following intervention to meet STG and LTG's:  PT    Recommendations:  This patient would benefit from continued therapy.     Frequency:  2 X a month, once daily  Duration:  for 2 months      This patient would benefit from further evaluation.    Please refer to the daily flowsheet for treatment today, total treatment time and time spent performing 1:1 timed codes.

## 2022-03-18 ENCOUNTER — VIRTUAL VISIT (OUTPATIENT)
Dept: INTERNAL MEDICINE | Facility: CLINIC | Age: 62
End: 2022-03-18
Payer: COMMERCIAL

## 2022-03-18 DIAGNOSIS — R19.5 ELEVATED FECAL CALPROTECTIN: ICD-10-CM

## 2022-03-18 DIAGNOSIS — K52.9 CHRONIC DIARRHEA: Primary | ICD-10-CM

## 2022-03-18 DIAGNOSIS — E66.01 MORBID OBESITY (H): ICD-10-CM

## 2022-03-18 DIAGNOSIS — M35.9 CONNECTIVE TISSUE DISORDER (H): ICD-10-CM

## 2022-03-18 PROCEDURE — 99214 OFFICE O/P EST MOD 30 MIN: CPT | Mod: 95 | Performed by: INTERNAL MEDICINE

## 2022-03-18 NOTE — PROGRESS NOTES
"Charu is a 61 year old who is being evaluated via a billable video visit.      How would you like to obtain your AVS? MyChart  If the video visit is dropped, the invitation should be resent by: Text to cell phone: [phone]  Will anyone else be joining your video visit? No      Video Start Time: 434    Assessment & Plan     Chronic diarrhea  Connective tissue disorder (H)  Morbid obesity (H)  - she is on plaquenil. Some risk for immunosupp. Causes, check giardia and o&p  - stool studies for chronic diarrhea  -if negative celiac w/u, possible colonoscopy if cont      Ordering of each unique test  I spent a total of 44 minutes on the day of the visit.   Time spent doing chart review, history and exam, documentation and further activities per the note       BMI:   Estimated body mass index is 36.39 kg/m  as calculated from the following:    Height as of 12/29/21: 1.626 m (5' 4\").    Weight as of 12/29/21: 96.2 kg (212 lb).           No follow-ups on file.    Krish Shannon MD  [facility]    Subjective   Charu is a 61 year old who presents for the following health issues     History of Present Illness       Reason for visit:  Diarrhea that has worsened in the last couple of weeks  Symptom onset:  More than a month  Symptoms include:  Diarrihea  Symptom intensity:  Moderate  Symptom progression:  Staying the same  Had these symptoms before:  Yes  Has tried/received treatment for these symptoms:  No  What makes it worse:  Eating certain foods causes them  What makes it better:  Anti-diarrhea meds    She eats 2-3 servings of fruits and vegetables daily.She consumes 0 sweetened beverage(s) daily.She exercises with enough effort to increase her heart rate 10 to 19 minutes per day.  She exercises with enough effort to increase her heart rate 3 or less days per week.   She is taking medications regularly.         Review of Systems         Objective           Vitals:  No vitals were " obtained today due to virtual visit.    Physical Exam   GENERAL: healthy, no distress and over weight                Video-Visit Details    Type of service:  Video Visit    Video End Time:4:59 PM    Originating Location (pt. Location): Home    Distant Location (provider location):  [facility]     Platform used for Video Visit: "[employer]"

## 2022-03-18 NOTE — PATIENT INSTRUCTIONS
Patient Education     Diet for Vomiting or Diarrhea (Adult)     Your symptoms may return or get worse after eating certain foods listed below. If this happens, stop eating these foods until your symptoms ease and you feel better.  Once the vomiting stops, follow the steps below.   During the first 12 to 24 hours  During the first 12 to 24 hours, follow this diet:    Drinks. Plain water, sport drinks like electrolyte solutions, drinks without caffeine, mineral water (plain or flavored), and clear fruit juices. Don't have drinks with caffeine or citrus juices. This is because they are high in acid and can irritate your stomach.    Soups. Clear broth.    Desserts. Plain gelatin, frozen ice pops, and fruit juice bars without pieces of fruit. As you feel better, you may add 6 to 8 ounces of yogurt per day. If you have diarrhea, don't have foods or drinks with sugar, high-fructose corn syrup, or sugar alcohols.  During the next 24 hours  During the next 24 hours you may add the following to the above:    Hot cereal, plain toast, bread, rolls, and crackers    Plain noodles, rice, mashed potatoes, and chicken noodle or rice soup    Unsweetened canned fruit (but not pineapple) and bananas  Don't eat more than 15 grams of fat a day. Do this by staying away from margarine, butter, oils, mayonnaise, sauces, gravies, fried foods, peanut butter, meat, poultry, and fish.  Don't eat much fiber. Stay away from raw or cooked vegetables, fresh fruits (except bananas), and bran cereals.  Limit how much caffeine and chocolate you have. Do' use any spices or seasonings except salt.  During the next 24 hours  Slowly go back to your normal diet, as you feel better and your symptoms ease.  [employer] last reviewed this educational content on 9/1/2019 2000-2021 [employer]. All rights reserved. This information is not intended as a substitute for professional medical care. Always follow your healthcare professional's  instructions.

## 2022-03-19 ENCOUNTER — LAB (OUTPATIENT)
Dept: LAB | Facility: CLINIC | Age: 62
End: 2022-03-19
Payer: COMMERCIAL

## 2022-03-19 DIAGNOSIS — K52.9 CHRONIC DIARRHEA: ICD-10-CM

## 2022-03-21 ENCOUNTER — MYC MEDICAL ADVICE (OUTPATIENT)
Dept: INTERNAL MEDICINE | Facility: CLINIC | Age: 62
End: 2022-03-21
Payer: COMMERCIAL

## 2022-03-24 PROCEDURE — 87177 OVA AND PARASITES SMEARS: CPT

## 2022-03-24 PROCEDURE — 87209 SMEAR COMPLEX STAIN: CPT

## 2022-03-24 PROCEDURE — 83993 ASSAY FOR CALPROTECTIN FECAL: CPT

## 2022-03-24 PROCEDURE — 87506 IADNA-DNA/RNA PROBE TQ 6-11: CPT

## 2022-03-24 PROCEDURE — 87329 GIARDIA AG IA: CPT | Mod: 59

## 2022-03-24 PROCEDURE — 87328 CRYPTOSPORIDIUM AG IA: CPT

## 2022-03-25 LAB
C PARVUM AG STL QL IA: NEGATIVE
CALPROTECTIN STL-MCNT: 226 MG/KG (ref 0–49.9)
G LAMBLIA AG STL QL IA: NEGATIVE
O+P STL MICRO: NEGATIVE

## 2022-04-11 ENCOUNTER — MYC MEDICAL ADVICE (OUTPATIENT)
Dept: INTERNAL MEDICINE | Facility: CLINIC | Age: 62
End: 2022-04-11
Payer: COMMERCIAL

## 2022-04-12 ENCOUNTER — VIRTUAL VISIT (OUTPATIENT)
Dept: INTERNAL MEDICINE | Facility: CLINIC | Age: 62
End: 2022-04-12
Payer: COMMERCIAL

## 2022-04-12 ENCOUNTER — TRANSFERRED RECORDS (OUTPATIENT)
Dept: INTERNAL MEDICINE | Facility: CLINIC | Age: 62
End: 2022-04-12

## 2022-04-12 VITALS — OXYGEN SATURATION: 96 %

## 2022-04-12 DIAGNOSIS — E66.01 MORBID OBESITY (H): ICD-10-CM

## 2022-04-12 DIAGNOSIS — M35.9 CONNECTIVE TISSUE DISORDER (H): ICD-10-CM

## 2022-04-12 DIAGNOSIS — U07.1 CLINICAL DIAGNOSIS OF COVID-19: Primary | ICD-10-CM

## 2022-04-12 PROCEDURE — 99214 OFFICE O/P EST MOD 30 MIN: CPT | Mod: GT | Performed by: INTERNAL MEDICINE

## 2022-04-12 NOTE — TELEPHONE ENCOUNTER
Please see mychart from patient and advise appropriate course of action.    Nolvia Potts RN  [facility] Triage Nurse

## 2022-04-12 NOTE — PROGRESS NOTES
"Charu is a 61 year old who is being evaluated via a billable video visit.      How would you like to obtain your AVS? MyChart  If the video visit is dropped, the invitation should be resent by: Send to e-mail at: [email or web address]  Will anyone else be joining your video visit? No      Video Start Time: 3:37  PM    Assessment & Plan     Clinical diagnosis of COVID-19  Within 5-days of symptom onset.  Meets risk factor criteria for antiviral treatment  - nirmatrelvir and ritonavir (PAXLOVID) therapy pack; Take 3 tablets by mouth 2 times daily for 5 days Take 2 Nirmatrelvir tablets and 1 Ritonavir tablet twice daily for 5 days.  Hold simvastatin while on antiviral.  Patient given specific instructions on this  Okay to continue topical estrogen    Connective tissue disorder (H)  Morbid obesity (H)    Prescription drug management         BMI:   Estimated body mass index is 36.39 kg/m  as calculated from the following:    Height as of 12/29/21: 1.626 m (5' 4\").    Weight as of 12/29/21: 96.2 kg (212 lb).       See Patient Instructions    No follow-ups on file.    Krish Shannon MD  [facility]    Subjective   Charu is a 61 year old who presents for the following health issues     HPI       COVID-19 Symptom Review  How many days ago did these symptoms start? 04/10/2022    Are any of the following symptoms significant for you?    New or worsening difficulty breathing? No    Worsening cough? Yes, I am coughing up mucus-little production    Fever or chills? No    Headache: YES- slight    Sore throat: YES    Chest pain: YES    Diarrhea: some stool issues    Body aches? YES- with fatigue    What treatments has patient tried? Mucinex, nyquil ibuprofen   Does patient live in a nursing home, group home, or shelter? no  Does patient have a way to get food/medications during quarantined? Yes, I have a friend or family member who can help me.               No flowsheet data found.      Review of " Systems         Objective           Vitals:  No vitals were obtained today due to virtual visit.    Physical Exam   GENERAL: alert, no distress and over weight  EYES: Eyes grossly normal to inspection.  No discharge or erythema, or obvious scleral/conjunctival abnormalities.  RESP: Coughing.  No tachypnea, talks without any noticeable dyspnea  SKIN: Visible skin clear. No significant rash, abnormal pigmentation or lesions.  NEURO: Cranial nerves grossly intact.  Mentation and speech appropriate for age.  PSYCH: Mentation appears normal, affect normal/bright, judgement and insight intact, normal speech and appearance well-groomed.                Video-Visit Details    Type of service:  Video Visit    Video End Time:355    Originating Location (pt. Location): Home    Distant Location (provider location):  [facility]     Platform used for Video Visit: [employer]

## 2022-04-12 NOTE — PATIENT INSTRUCTIONS
Take your first dose of Paxlovid 12 hours after your last dose of simvastatin.  Remain off the simvastatin for the duration of your therapy.  Once your 5 days of treatment have been completed you may restart your simvastatin.

## 2022-04-26 ENCOUNTER — TRANSFERRED RECORDS (OUTPATIENT)
Dept: HEALTH INFORMATION MANAGEMENT | Facility: CLINIC | Age: 62
End: 2022-04-26
Payer: COMMERCIAL

## 2022-04-27 DIAGNOSIS — R19.5 LOOSE STOOLS: ICD-10-CM

## 2022-04-27 DIAGNOSIS — R19.4 CHANGE IN BOWEL HABITS: ICD-10-CM

## 2022-04-29 ENCOUNTER — LAB (OUTPATIENT)
Dept: LAB | Facility: CLINIC | Age: 62
End: 2022-04-29
Payer: COMMERCIAL

## 2022-04-29 DIAGNOSIS — R19.5 LOOSE STOOLS: ICD-10-CM

## 2022-04-29 DIAGNOSIS — Z13.220 SCREENING FOR HYPERLIPIDEMIA: ICD-10-CM

## 2022-04-29 DIAGNOSIS — R19.4 CHANGE IN BOWEL HABITS: ICD-10-CM

## 2022-04-29 DIAGNOSIS — E03.9 HYPOTHYROIDISM: ICD-10-CM

## 2022-04-29 LAB
BASOPHILS # BLD AUTO: 0 10E3/UL (ref 0–0.2)
BASOPHILS NFR BLD AUTO: 1 %
EOSINOPHIL # BLD AUTO: 0.2 10E3/UL (ref 0–0.7)
EOSINOPHIL NFR BLD AUTO: 3 %
ERYTHROCYTE [DISTWIDTH] IN BLOOD BY AUTOMATED COUNT: 13.2 % (ref 10–15)
HCT VFR BLD AUTO: 40.1 % (ref 35–47)
HGB BLD-MCNC: 13.2 G/DL (ref 11.7–15.7)
LYMPHOCYTES # BLD AUTO: 1.4 10E3/UL (ref 0.8–5.3)
LYMPHOCYTES NFR BLD AUTO: 25 %
MCH RBC QN AUTO: 31.7 PG (ref 26.5–33)
MCHC RBC AUTO-ENTMCNC: 32.9 G/DL (ref 31.5–36.5)
MCV RBC AUTO: 96 FL (ref 78–100)
MONOCYTES # BLD AUTO: 0.5 10E3/UL (ref 0–1.3)
MONOCYTES NFR BLD AUTO: 10 %
NEUTROPHILS # BLD AUTO: 3.4 10E3/UL (ref 1.6–8.3)
NEUTROPHILS NFR BLD AUTO: 62 %
PLATELET # BLD AUTO: 247 10E3/UL (ref 150–450)
RBC # BLD AUTO: 4.16 10E6/UL (ref 3.8–5.2)
WBC # BLD AUTO: 5.5 10E3/UL (ref 4–11)

## 2022-04-29 PROCEDURE — 80050 GENERAL HEALTH PANEL: CPT

## 2022-04-29 PROCEDURE — 81376 HLA II TYPING 1 LOCUS LR: CPT

## 2022-04-29 PROCEDURE — 80061 LIPID PANEL: CPT

## 2022-04-29 PROCEDURE — 86364 TISS TRNSGLTMNASE EA IG CLAS: CPT

## 2022-04-29 PROCEDURE — 36415 COLL VENOUS BLD VENIPUNCTURE: CPT

## 2022-04-29 PROCEDURE — 86258 DGP ANTIBODY EACH IG CLASS: CPT

## 2022-05-01 LAB
ALBUMIN SERPL-MCNC: 4.5 G/DL (ref 3.4–5)
ALP SERPL-CCNC: 47 U/L (ref 40–150)
ALT SERPL W P-5'-P-CCNC: 40 U/L (ref 0–50)
ANION GAP SERPL CALCULATED.3IONS-SCNC: 7 MMOL/L (ref 3–14)
AST SERPL W P-5'-P-CCNC: 27 U/L (ref 0–45)
BILIRUB SERPL-MCNC: 0.5 MG/DL (ref 0.2–1.3)
BUN SERPL-MCNC: 15 MG/DL (ref 7–30)
CALCIUM SERPL-MCNC: 10 MG/DL (ref 8.5–10.1)
CHLORIDE BLD-SCNC: 104 MMOL/L (ref 94–109)
CHOLEST SERPL-MCNC: 151 MG/DL
CO2 SERPL-SCNC: 27 MMOL/L (ref 20–32)
CREAT SERPL-MCNC: 0.86 MG/DL (ref 0.52–1.04)
FASTING STATUS PATIENT QL REPORTED: YES
GFR SERPL CREATININE-BSD FRML MDRD: 77 ML/MIN/1.73M2
GLUCOSE BLD-MCNC: 84 MG/DL (ref 70–99)
HDLC SERPL-MCNC: 46 MG/DL
LDLC SERPL CALC-MCNC: 86 MG/DL
NONHDLC SERPL-MCNC: 105 MG/DL
POTASSIUM BLD-SCNC: 4.7 MMOL/L (ref 3.4–5.3)
PROT SERPL-MCNC: 8.1 G/DL (ref 6.8–8.8)
SODIUM SERPL-SCNC: 138 MMOL/L (ref 133–144)
TRIGL SERPL-MCNC: 95 MG/DL
TSH SERPL DL<=0.005 MIU/L-ACNC: 2.34 MU/L (ref 0.4–4)

## 2022-05-02 LAB
GLIADIN IGA SER-ACNC: 1.4 U/ML
GLIADIN IGG SER-ACNC: <0.6 U/ML
TTG IGA SER-ACNC: 0.9 U/ML

## 2022-05-03 ENCOUNTER — APPOINTMENT (OUTPATIENT)
Dept: LAB | Facility: CLINIC | Age: 62
End: 2022-05-03
Payer: COMMERCIAL

## 2022-05-03 LAB — C DIFF TOX B STL QL: NEGATIVE

## 2022-05-03 PROCEDURE — 87493 C DIFF AMPLIFIED PROBE: CPT

## 2022-05-06 ENCOUNTER — TRANSFERRED RECORDS (OUTPATIENT)
Dept: HEALTH INFORMATION MANAGEMENT | Facility: CLINIC | Age: 62
End: 2022-05-06
Payer: COMMERCIAL

## 2022-05-06 LAB
DQA1* LOCUS: NORMAL
DQA1*: NORMAL
DQB1* LOCUS: NORMAL
DQB1*: NORMAL
DRSSO TEST METHOD: NORMAL
ZZZDRSSO COMMENTS: NORMAL

## 2022-05-09 ENCOUNTER — TRANSFERRED RECORDS (OUTPATIENT)
Dept: HEALTH INFORMATION MANAGEMENT | Facility: CLINIC | Age: 62
End: 2022-05-09
Payer: COMMERCIAL

## 2022-05-16 DIAGNOSIS — E03.4 HYPOTHYROIDISM DUE TO ACQUIRED ATROPHY OF THYROID: ICD-10-CM

## 2022-05-17 ENCOUNTER — TRANSFERRED RECORDS (OUTPATIENT)
Dept: HEALTH INFORMATION MANAGEMENT | Facility: CLINIC | Age: 62
End: 2022-05-17
Payer: COMMERCIAL

## 2022-05-17 RX ORDER — LEVOTHYROXINE SODIUM 137 UG/1
TABLET ORAL
Qty: 90 TABLET | Refills: 3 | Status: SHIPPED | OUTPATIENT
Start: 2022-05-17 | End: 2023-03-03

## 2022-05-17 NOTE — TELEPHONE ENCOUNTER
"Prescription approved per [facility] Refill Protocol.      Requested Prescriptions   Pending Prescriptions Disp Refills     levothyroxine (SYNTHROID/LEVOTHROID) 137 MCG tablet [Pharmacy Med Name: LEVOTHYROXINE 137 MCG TABLET] 90 tablet 2     Sig: TAKE 1 TABLET BY MOUTH EVERY DAY       Thyroid Protocol Passed - 5/16/2022 12:38 AM        Passed - Patient is 12 years or older        Passed - Recent (12 mo) or future (30 days) visit within the authorizing provider's specialty     Patient has had an office visit with the authorizing provider or a provider within the authorizing providers department within the previous 12 mos or has a future within next 30 days. See \"Patient Info\" tab in inbasket, or \"Choose Columns\" in Meds & Orders section of the refill encounter.              Passed - Medication is active on med list        Passed - Normal TSH on file in past 12 months     Recent Labs   Lab Test 04/29/22  0909   TSH 2.34              Passed - No active pregnancy on record     If patient is pregnant or has had a positive pregnancy test, please check TSH.          Passed - No positive pregnancy test in past 12 months     If patient is pregnant or has had a positive pregnancy test, please check TSH.               "

## 2022-06-08 DIAGNOSIS — I10 ESSENTIAL HYPERTENSION, BENIGN: ICD-10-CM

## 2022-06-09 RX ORDER — SPIRONOLACTONE AND HYDROCHLOROTHIAZIDE 25; 25 MG/1; MG/1
TABLET ORAL
Qty: 90 TABLET | Refills: 2 | Status: SHIPPED | OUTPATIENT
Start: 2022-06-09 | End: 2023-03-03

## 2022-07-04 DIAGNOSIS — E78.5 HYPERLIPIDEMIA LDL GOAL <130: ICD-10-CM

## 2022-07-06 RX ORDER — SIMVASTATIN 40 MG
TABLET ORAL
Qty: 90 TABLET | Refills: 0 | Status: SHIPPED | OUTPATIENT
Start: 2022-07-06 | End: 2023-03-03

## 2022-07-19 ENCOUNTER — TRANSFERRED RECORDS (OUTPATIENT)
Dept: INTERNAL MEDICINE | Facility: CLINIC | Age: 62
End: 2022-07-19

## 2022-07-19 LAB
ALT SERPL-CCNC: 26 IU/L (ref 5–35)
AST SERPL-CCNC: 25 U/L (ref 5–34)
CREATININE (EXTERNAL): 0.81 MG/DL (ref 0.5–1.3)

## 2022-08-31 ENCOUNTER — TRANSFERRED RECORDS (OUTPATIENT)
Dept: HEALTH INFORMATION MANAGEMENT | Facility: CLINIC | Age: 62
End: 2022-08-31

## 2022-11-11 ENCOUNTER — TRANSFERRED RECORDS (OUTPATIENT)
Dept: HEALTH INFORMATION MANAGEMENT | Facility: CLINIC | Age: 62
End: 2022-11-11

## 2022-11-16 ENCOUNTER — TRANSFERRED RECORDS (OUTPATIENT)
Dept: HEALTH INFORMATION MANAGEMENT | Facility: CLINIC | Age: 62
End: 2022-11-16

## 2022-11-20 ENCOUNTER — HEALTH MAINTENANCE LETTER (OUTPATIENT)
Age: 62
End: 2022-11-20

## 2022-12-23 PROBLEM — M62.89 PELVIC FLOOR DYSFUNCTION: Status: RESOLVED | Noted: 2022-01-04 | Resolved: 2022-12-23

## 2022-12-23 PROBLEM — N94.10 DYSPAREUNIA IN FEMALE: Status: RESOLVED | Noted: 2022-01-04 | Resolved: 2022-12-23

## 2023-01-09 DIAGNOSIS — I10 ESSENTIAL HYPERTENSION, BENIGN: ICD-10-CM

## 2023-01-09 RX ORDER — METOPROLOL TARTRATE 50 MG
TABLET ORAL
Qty: 180 TABLET | Refills: 0 | Status: SHIPPED | OUTPATIENT
Start: 2023-01-09 | End: 2023-03-03

## 2023-01-09 NOTE — LETTER
[facility] [address] [id number] [phone]  January 10, 2023  Cherry Costello Kim   [address] [place] [id number]    Dear Charu,    I am contacting you regarding the refill request we received for you. After reviewing your chart it looks like you are overdue for your annual and for a med check. Please call [phone] or schedule this through my chart to continue to receive refills. If you anticipate running out before your appointment let us know and we can send in a barbie refill.       Thank you,     [facility] nursing staff

## 2023-03-03 ENCOUNTER — OFFICE VISIT (OUTPATIENT)
Dept: INTERNAL MEDICINE | Facility: CLINIC | Age: 63
End: 2023-03-03
Payer: COMMERCIAL

## 2023-03-03 VITALS
BODY MASS INDEX: 34.23 KG/M2 | HEIGHT: 64 IN | DIASTOLIC BLOOD PRESSURE: 68 MMHG | WEIGHT: 200.5 LBS | SYSTOLIC BLOOD PRESSURE: 108 MMHG | TEMPERATURE: 98.7 F | HEART RATE: 74 BPM | OXYGEN SATURATION: 98 %

## 2023-03-03 DIAGNOSIS — B00.9 HSV INFECTION: ICD-10-CM

## 2023-03-03 DIAGNOSIS — M35.9 CONNECTIVE TISSUE DISORDER (H): ICD-10-CM

## 2023-03-03 DIAGNOSIS — I10 ESSENTIAL HYPERTENSION, BENIGN: ICD-10-CM

## 2023-03-03 DIAGNOSIS — Z13.220 LIPID SCREENING: ICD-10-CM

## 2023-03-03 DIAGNOSIS — Z23 NEED FOR VACCINATION: ICD-10-CM

## 2023-03-03 DIAGNOSIS — E03.4 HYPOTHYROIDISM DUE TO ACQUIRED ATROPHY OF THYROID: ICD-10-CM

## 2023-03-03 DIAGNOSIS — Z00.00 ROUTINE GENERAL MEDICAL EXAMINATION AT A HEALTH CARE FACILITY: Primary | ICD-10-CM

## 2023-03-03 PROBLEM — E66.01 MORBID OBESITY (H): Status: RESOLVED | Noted: 2021-03-02 | Resolved: 2023-03-03

## 2023-03-03 LAB
ANION GAP SERPL CALCULATED.3IONS-SCNC: 14 MMOL/L (ref 7–15)
BUN SERPL-MCNC: 6.7 MG/DL (ref 8–23)
CALCIUM SERPL-MCNC: 10.6 MG/DL (ref 8.8–10.2)
CHLORIDE SERPL-SCNC: 100 MMOL/L (ref 98–107)
CHOLEST SERPL-MCNC: 186 MG/DL
CREAT SERPL-MCNC: 0.77 MG/DL (ref 0.51–0.95)
DEPRECATED HCO3 PLAS-SCNC: 26 MMOL/L (ref 22–29)
GFR SERPL CREATININE-BSD FRML MDRD: 87 ML/MIN/1.73M2
GLUCOSE SERPL-MCNC: 81 MG/DL (ref 70–99)
HDLC SERPL-MCNC: 53 MG/DL
LDLC SERPL CALC-MCNC: 112 MG/DL
NONHDLC SERPL-MCNC: 133 MG/DL
POTASSIUM SERPL-SCNC: 3.9 MMOL/L (ref 3.4–5.3)
SODIUM SERPL-SCNC: 140 MMOL/L (ref 136–145)
T4 FREE SERPL-MCNC: 1.32 NG/DL (ref 0.9–1.7)
TRIGL SERPL-MCNC: 107 MG/DL
TSH SERPL DL<=0.005 MIU/L-ACNC: 10.2 UIU/ML (ref 0.3–4.2)

## 2023-03-03 PROCEDURE — 80061 LIPID PANEL: CPT | Performed by: INTERNAL MEDICINE

## 2023-03-03 PROCEDURE — 99396 PREV VISIT EST AGE 40-64: CPT | Mod: 25 | Performed by: INTERNAL MEDICINE

## 2023-03-03 PROCEDURE — 99214 OFFICE O/P EST MOD 30 MIN: CPT | Mod: 25 | Performed by: INTERNAL MEDICINE

## 2023-03-03 PROCEDURE — 90471 IMMUNIZATION ADMIN: CPT | Performed by: INTERNAL MEDICINE

## 2023-03-03 PROCEDURE — 84439 ASSAY OF FREE THYROXINE: CPT | Performed by: INTERNAL MEDICINE

## 2023-03-03 PROCEDURE — 84443 ASSAY THYROID STIM HORMONE: CPT | Performed by: INTERNAL MEDICINE

## 2023-03-03 PROCEDURE — 36415 COLL VENOUS BLD VENIPUNCTURE: CPT | Performed by: INTERNAL MEDICINE

## 2023-03-03 PROCEDURE — 90677 PCV20 VACCINE IM: CPT | Performed by: INTERNAL MEDICINE

## 2023-03-03 PROCEDURE — 80048 BASIC METABOLIC PNL TOTAL CA: CPT | Performed by: INTERNAL MEDICINE

## 2023-03-03 RX ORDER — CHOLESTYRAMINE 4 G/9G
POWDER, FOR SUSPENSION ORAL
COMMUNITY
Start: 2023-02-16

## 2023-03-03 NOTE — PROGRESS NOTES
SUBJECTIVE:   CC: Charu is an 62 year old who presents for preventive health visit.     Patient has been advised of split billing requirements and indicates understanding: Yes  Healthy Habits:     Getting at least 3 servings of Calcium per day:  NO    Bi-annual eye exam:  Yes    Dental care twice a year:  Yes    Sleep apnea or symptoms of sleep apnea:  None    Diet:  Gluten-free/reduced    Frequency of exercise:  6-7 days/week    Duration of exercise:  Less than 15 minutes    Taking medications regularly:  Yes    Barriers to taking medications:  None    Medication side effects:  None    PHQ-2 Total Score: 0    Additional concerns today:  Yes (hair breakage)        Today's PHQ-2 Score:   PHQ-2 ( 1999 Pfizer) 3/3/2023   Q1: Little interest or pleasure in doing things 0   Q2: Feeling down, depressed or hopeless 0   PHQ-2 Score 0   PHQ-2 Total Score (12-17 Years)- Positive if 3 or more points; Administer PHQ-A if positive -   Q1: Little interest or pleasure in doing things Not at all   Q2: Feeling down, depressed or hopeless Not at all   PHQ-2 Score 0           Social History     Tobacco Use     Smoking status: Never     Smokeless tobacco: Never   Substance Use Topics     Alcohol use: Yes     Comment: 1/ day         Alcohol Use 3/3/2023   Prescreen: >3 drinks/day or >7 drinks/week? No       Reviewed orders with patient.  Reviewed health maintenance and updated orders accordingly - Yes  Labs reviewed in EPIC    Breast Cancer Screening:    FHS-7:   Breast CA Risk Assessment (FHS-7) 12/30/2021 3/3/2023   Did any of your first-degree relatives have breast or ovarian cancer? Yes Yes   Did any of your relatives have bilateral breast cancer? No No   Did any man in your family have breast cancer? No No   Did any woman in your family have breast and ovarian cancer? No Yes   Did any woman in your family have breast cancer before age 50 y? No No   Do you have 2 or more relatives with breast and/or ovarian cancer? Yes Yes   Do  you have 2 or more relatives with breast and/or bowel cancer? Yes No       Mammogram Screening: Recommended mammography every 1-2 years with patient discussion and risk factor consideration  Pertinent mammograms are reviewed under the imaging tab.    History of abnormal Pap smear: NO - age 30-65 PAP every 5 years with negative HPV co-testing recommended  PAP / HPV Latest Ref Rng & Units 7/31/2018 1/15/2015 8/31/2011   PAP (Historical) - NIL NIL NIL   HPV16 NEG:Negative Negative - -   HPV18 NEG:Negative Negative - -   HRHPV NEG:Negative Negative - -     Reviewed and updated as needed this visit by clinical staff   Tobacco  Allergies  Meds              Reviewed and updated as needed this visit by Provider                     Review of Systems  CONSTITUTIONAL: NEGATIVE for fever, chills, change in weight  INTEGUMENTARY/SKIN: NEGATIVE for worrisome rashes, moles or lesions  EYES: NEGATIVE for vision changes or irritation  ENT: NEGATIVE for ear, mouth and throat problems  RESP: NEGATIVE for significant cough or SOB  BREAST: NEGATIVE for masses, tenderness or discharge  CV: NEGATIVE for chest pain, palpitations or peripheral edema  GI: NEGATIVE for nausea, abdominal pain, heartburn, or change in bowel habits  : NEGATIVE for unusual urinary or vaginal symptoms. No vaginal bleeding.  MUSCULOSKELETAL: NEGATIVE for significant arthralgias or myalgia  NEURO: NEGATIVE for weakness, dizziness or paresthesias  PSYCHIATRIC: NEGATIVE for changes in mood or affect      OBJECTIVE:   LMP 06/27/2007   Physical Exam  GENERAL APPEARANCE: healthy, alert and no distress  EYES: Eyes grossly normal to inspection, PERRL and conjunctivae and sclerae normal  HENT: ear canals and TM's normal, nose and mouth without ulcers or lesions, oropharynx clear and oral mucous membranes moist  NECK: no adenopathy, no asymmetry, masses, or scars and thyroid normal to palpation  RESP: lungs clear to auscultation - no rales, rhonchi or wheezes  CV:  regular rate and rhythm, normal S1 S2, no S3 or S4, no murmur, click or rub, no peripheral edema and peripheral pulses strong  ABDOMEN: soft, nontender, no hepatosplenomegaly, no masses and bowel sounds normal  MS: no musculoskeletal defects are noted and gait is age appropriate without ataxia  SKIN: no suspicious lesions or rashes  NEURO: Normal strength and tone, sensory exam grossly normal, mentation intact and speech normal  PSYCH: mentation appears normal and affect normal/bright    Labs reviewed in Epic  Results for orders placed or performed in visit on 03/03/23   Basic metabolic panel  (Ca, Cl, CO2, Creat, Gluc, K, Na, BUN)     Status: Abnormal   Result Value Ref Range    Sodium 140 136 - 145 mmol/L    Potassium 3.9 3.4 - 5.3 mmol/L    Chloride 100 98 - 107 mmol/L    Carbon Dioxide (CO2) 26 22 - 29 mmol/L    Anion Gap 14 7 - 15 mmol/L    Urea Nitrogen 6.7 (L) 8.0 - 23.0 mg/dL    Creatinine 0.77 0.51 - 0.95 mg/dL    Calcium 10.6 (H) 8.8 - 10.2 mg/dL    Glucose 81 70 - 99 mg/dL    GFR Estimate 87 >60 mL/min/1.73m2   Lipid panel reflex to direct LDL Non-fasting     Status: Abnormal   Result Value Ref Range    Cholesterol 186 <200 mg/dL    Triglycerides 107 <150 mg/dL    Direct Measure HDL 53 >=50 mg/dL    LDL Cholesterol Calculated 112 (H) <=100 mg/dL    Non HDL Cholesterol 133 (H) <130 mg/dL    Narrative    Cholesterol  Desirable:  <200 mg/dL    Triglycerides  Normal:  Less than 150 mg/dL  Borderline High:  150-199 mg/dL  High:  200-499 mg/dL  Very High:  Greater than or equal to 500 mg/dL    Direct Measure HDL  Female:  Greater than or equal to 50 mg/dL   Male:  Greater than or equal to 40 mg/dL    LDL Cholesterol  Desirable:  <100mg/dL  Above Desirable:  100-129 mg/dL   Borderline High:  130-159 mg/dL   High:  160-189 mg/dL   Very High:  >= 190 mg/dL    Non HDL Cholesterol  Desirable:  130 mg/dL  Above Desirable:  130-159 mg/dL  Borderline High:  160-189 mg/dL  High:  190-219 mg/dL  Very High:  Greater than  or equal to 220 mg/dL   TSH with free T4 reflex     Status: Abnormal   Result Value Ref Range    TSH 10.20 (H) 0.30 - 4.20 uIU/mL   T4 free     Status: Normal   Result Value Ref Range    Free T4 1.32 0.90 - 1.70 ng/dL        ASSESSMENT/PLAN:       ICD-10-CM    1. Routine general medical examination at a health care facility  Z00.00       2. Connective tissue disorder (H)  M35.9       3. Essential hypertension, benign  I10 Basic metabolic panel  (Ca, Cl, CO2, Creat, Gluc, K, Na, BUN)     spironolactone-HCTZ (ALDACTAZIDE) 25-25 MG tablet     metoprolol tartrate (LOPRESSOR) 50 MG tablet     Basic metabolic panel  (Ca, Cl, CO2, Creat, Gluc, K, Na, BUN)      4. Lipid screening  Z13.220 Lipid panel reflex to direct LDL Non-fasting     Lipid panel reflex to direct LDL Non-fasting      5. Hypothyroidism due to acquired atrophy of thyroid  E03.4 TSH with free T4 reflex     levothyroxine (SYNTHROID/LEVOTHROID) 150 MCG tablet     TSH with free T4 reflex     T4 free     TSH with free T4 reflex      6. Need for vaccination  Z23 Pneumococcal 20 Valent Conjugate (Prevnar 20)      7. HSV infection  B00.9 valACYclovir (VALTREX) 500 MG tablet        -Updated screening, immunizations, prevention.  Please see health maintenance list, care gaps  -increase dose of thyroid replacement     Patient has been advised of split billing requirements and indicates understanding: Yes      COUNSELING:  Reviewed preventive health counseling, as reflected in patient instructions       Regular exercise       Healthy diet/nutrition        She reports that she has never smoked. She has never used smokeless tobacco.          Krish Shannon MD  [facility]

## 2023-03-05 RX ORDER — METOPROLOL TARTRATE 50 MG
50 TABLET ORAL 2 TIMES DAILY
Qty: 180 TABLET | Refills: 3 | Status: SHIPPED | OUTPATIENT
Start: 2023-03-05 | End: 2024-04-01

## 2023-03-05 RX ORDER — SPIRONOLACTONE AND HYDROCHLOROTHIAZIDE 25; 25 MG/1; MG/1
1 TABLET ORAL DAILY
Qty: 90 TABLET | Refills: 3 | Status: SHIPPED | OUTPATIENT
Start: 2023-03-05 | End: 2024-03-25

## 2023-03-05 RX ORDER — LEVOTHYROXINE SODIUM 150 UG/1
150 TABLET ORAL DAILY
Qty: 90 TABLET | Refills: 1 | Status: SHIPPED | OUTPATIENT
Start: 2023-03-05 | End: 2023-08-01

## 2023-03-05 RX ORDER — VALACYCLOVIR HYDROCHLORIDE 500 MG/1
500 TABLET, FILM COATED ORAL DAILY
Qty: 90 TABLET | Refills: 3 | Status: SHIPPED | OUTPATIENT
Start: 2023-03-05 | End: 2024-01-04

## 2023-03-06 ENCOUNTER — TRANSFERRED RECORDS (OUTPATIENT)
Dept: HEALTH INFORMATION MANAGEMENT | Facility: CLINIC | Age: 63
End: 2023-03-06

## 2023-03-06 LAB
ALT SERPL-CCNC: 48 IU/L (ref 5–35)
AST SERPL-CCNC: 39 U/L (ref 5–34)
CREATININE (EXTERNAL): 0.71 MG/DL (ref 0.5–1.3)
GFR ESTIMATED (EXTERNAL): 88.9 ML/MIN/1.73M2

## 2023-03-10 NOTE — PROGRESS NOTES
[facility]  OB/GYN Clinic Note    Cherry is a 62 year old  female who presents for Medicare Limited exam.   Do you have a Health Care Directive?: Yes, advance care planning is on file.    HPI :  Patient's primary care provider is Dr Krish Shannon.   Patient presents for annual exam. Still has painful intercourse. Not using estrace frequently.  Some vaginal dryness. No vaginal bleeding. Has hx of rectal abscess, seton in place. Had surgeyr last year. Some discomfort from that. Normal BM. Lymphocytic colitis.   Pap today.  Normal in the past.   Sexualy active with discomfort as above.   Mammogram normal. DEXA: 2018. Repeat in 10-15 years. Takes calciuim and vitamin D.   Immunizations: up to date   Family hx of breast: mother with breast cancer at 56. Negative  for uterine, ovarian cancer. Maternal sister with rectal cancer.   Diet/exercise: has made changes with decreasing dairy, gluten. Working on getting more cardio.   Lives with , feels safe. Denies tobacco use, drug use. Occ alcohol use.     GYNECOLOGIC HISTORY:  Patient's last menstrual period was 2007..   reports that she has never smoked. She has never used smokeless tobacco.    STD testing offered?  Declined     Last PHQ-9 score on record=   PHQ-9 SCORE 3/13/2023   PHQ-9 Total Score 1     Last GAD7 score on record=   GAIL-7 SCORE 2020 2021 3/13/2023   Total Score 0 0 0       HEALTH MAINTENANCE:  Cholesterol:   Cholesterol   Date Value Ref Range Status   2023 186 <200 mg/dL Final   2022 151 <200 mg/dL Final   2021 156 <200 mg/dL Final   2019 166 <200 mg/dL Final      Last Mammo: 21, Result: Normal, Next Mammo: Today   Pap:   Lab Results   Component Value Date    PAP NIL, NEG-HPV 2018    PAP NIL 01/15/2015    PAP NIL 2011   DEXA:  18  Lumbar Spine (L1-L4) T-score:  0.5  Left Femoral Neck T-score:  0.6  Right Femoral Neck T-score:  0.5  Colonoscopy:  19,  Result:  Normal, Next Colonoscopy: 10 years.    HISTORY:  OB History    Para Term  AB Living   0 0 0 0 0 0   SAB IAB Ectopic Multiple Live Births   0 0 0 0 0     Past Medical History:   Diagnosis Date     Anal fistula      Arthritis 12/1/10    Connective tissue disease     ASCUS on Pap smear     - none since     Connective tissue disorder (H)     undifferentiated     Essential hypertension, benign      Genital herpes      Impaired fasting glucose      Obesity, unspecified      Pelvic floor dysfunction      Dede-rectal abscess     Rectal abscess at age 38, occasional rectal bleeding since then     Proteinuria     0.400 g/day negative w/u     Shellfish Allergy      Unspecified hemorrhoids without mention of complication      Unspecified hypothyroidism      Past Surgical History:   Procedure Laterality Date     COLONOSCOPY  2007     COSMETIC SURGERY  1978     CRYOTHERAPY, CERVICAL           EXAM UNDER ANESTHESIA RECTUM N/A 3/11/2021    Procedure: EXAM UNDER ANESTHESIA;  Surgeon: Elaine Silveira MD;  Location:  OR      RECONSTR NOSE+BULL SEPTAL REPAIR            PLACEMENT OF SETON RECTUM N/A 3/11/2021    Procedure: PLACEMENT OF SETON;  Surgeon: Elaine Silveira MD;  Location:  OR     ZZC NONSPECIFIC PROCEDURE      Tosillectomy     ZZC NONSPECIFIC PROCEDURE      rectal abcess     Family History   Problem Relation Age of Onset     Breast Cancer Mother         At 55 yrs     Hypertension Mother      Diabetes Mother         Mid forties     C.A.D. Mother          of CHF,  at 58yrs of ? MI     Connective Tissue Disorder Mother         Lupus     Respiratory Father         d. 79 from complications of pneumonia     Cancer Father         Lung Cancer     Other Cancer Father      Thyroid Disease Father      Mitral valve prolapse Sister         possible     No Known Problems Brother      Cancer Brother         brain cancer       LUNG DISEASE Brother         COPD-  lung transplant     Cancer Maternal Aunt         vaginal     Cancer Maternal Aunt         cervical     Cancer Maternal Aunt         Rectal cancer     Cancer Maternal Grandfather         Stomach cancer in his 40s     No Known Problems Maternal Grandmother      No Known Problems Paternal Grandmother      No Known Problems Other      Other Cancer Brother      Diabetes Brother      Social History     Socioeconomic History     Marital status:    Tobacco Use     Smoking status: Never     Smokeless tobacco: Never   Substance and Sexual Activity     Alcohol use: Yes     Comment: 1/ day     Drug use: No     Sexual activity: Yes     Partners: Male     Birth control/protection: Post-menopausal, None   Other Topics Concern      Service No     Blood Transfusions No     Caffeine Concern No     Occupational Exposure No     Hobby Hazards No     Sleep Concern No     Stress Concern No     Weight Concern No     Special Diet No     Back Care No     Exercise Yes     Comment: 2-3 x per week     Bike Helmet No     Seat Belt Yes     Self-Exams Yes     Parent/sibling w/ CABG, MI or angioplasty before 65F 55M? Yes     Comment: Mother     Current Outpatient Medications   Medication Sig     cholestyramine (QUESTRAN) 4 GM/DOSE powder TAKE 1 SCOOP BY MOUTH 2 TIMES EVERY DAY DISSOLVED IN 2-6 OUNCES OF WATER OR NONCARBONATED BEVERAGE     EPINEPHrine (EPIPEN/ADRENACLICK/OR ANY BX GENERIC EQUIV) 0.3 MG/0.3ML injection 2-pack Inject 0.3 mLs (0.3 mg) into the muscle as needed for anaphylaxis     estradiol (ESTRACE) 0.1 MG/GM vaginal cream Place 2 g vaginally three times a week     HYDROXYCHLOROQUINE SULFATE 200 MG PO TABS 1 TABLET DAILY     Iodoquinol-HC (HYDROCORTISONE-IODOQUINOL) 1-1 % CREA Externally apply  topically daily as needed.     levothyroxine (SYNTHROID/LEVOTHROID) 150 MCG tablet Take 1 tablet (150 mcg) by mouth daily     metoprolol tartrate (LOPRESSOR) 50 MG tablet Take 1 tablet (50 mg) by mouth 2 times daily      "spironolactone-HCTZ (ALDACTAZIDE) 25-25 MG tablet Take 1 tablet by mouth daily     Vaginal Lubricant (REPLENS) GEL Place vaginally as needed     valACYclovir (VALTREX) 500 MG tablet Take 1 tablet (500 mg) by mouth daily     No current facility-administered medications for this visit.     Allergies   Allergen Reactions     Lisinopril Anaphylaxis     Ciprofloxacin Diarrhea     Codeine      severe nausea     Codeine Nausea     Lisinopril Anaphylaxis     angioedema     Shellfish Allergy Nausea and Vomiting     Hives on neck     Sulfa Drugs      hives  Other reaction(s): Hives       Past medical, surgical, social and family history were reviewed and updated in EPIC.    EXAM:  /68   Ht 1.638 m (5' 4.5\")   Wt 91.6 kg (202 lb)   LMP 06/27/2007   BMI 34.14 kg/m     BMI: Body mass index is 34.14 kg/m .    Constitutional: Appearance: Well nourished, well developed alert, in no acute distress  Breasts: Inspection of Breasts:  No lymphadenopathy present    Palpation of Breasts and Axillae:  No masses present on palpation, no  breast tenderness    Axillary Lymph Nodes:  No lymphadenopathy present  Neurologic/Psychiatric:    Mental Status:  Oriented X3     Pelvic Exam:  External Genitalia:     Normal appearance for age, no discharge present, no tenderness present, no inflammatory lesions present, color normal  Vagina:     Normal vaginal vault without central or paravaginal defects, ATROPHIC  Cervix:     Appearance healthy, no lesions present, nontender to palpation, no bleeding present  Uterus:     Nontender to palpation, no masses present, position anteflexed, mobility: normal  Adnexa:     No adnexal tenderness present, no adnexal masses present  Perineum:     Perineum within normal limits, no evidence of trauma, no rashes or skin lesions present      Body mass index is 34.14 kg/m .  Weight management plan: Discussed healthy diet and exercise guidelines   reports that she has never smoked. She has never used smokeless " tobacco.      ASSESSMENT:  62 year old female with satisfactory annual exam.    ICD-10-CM    1. Encounter for gynecological examination without abnormal finding  Z01.419       2. Deep dyspareunia in female  N94.12 estradiol (ESTRACE) 0.1 MG/GM vaginal cream      3. Pap smear for cervical cancer screening  Z12.4 Pap thin layer screen with HPV - recommended age 30 - 65 years        Gyn exam with findings above. Pap collected. Reviewed stopping screening at 65. Continue pelvic exams. Mammogram today.   Refill provided for vaginal estrogen. Encouraged use as prescribed. Painful intercourse likely caused by atrophy but also may be related to rectal issues. No evidence of rectovaginal fistula on exam. Start with vaginal estrogen. Can add on Replens. If having persistent symptoms, follow-up for further evaluation and treatment.     COUNSELING:   Reviewed preventive health counseling, as reflected in patient instructions       Regular exercise       Healthy diet/nutrition       (Dede)menopause management    Sweta Wolf MD, MHS  03/13/23

## 2023-03-13 ENCOUNTER — OFFICE VISIT (OUTPATIENT)
Dept: OBGYN | Facility: CLINIC | Age: 63
End: 2023-03-13
Payer: COMMERCIAL

## 2023-03-13 ENCOUNTER — ANCILLARY PROCEDURE (OUTPATIENT)
Dept: MAMMOGRAPHY | Facility: CLINIC | Age: 63
End: 2023-03-13
Payer: COMMERCIAL

## 2023-03-13 VITALS
SYSTOLIC BLOOD PRESSURE: 114 MMHG | HEIGHT: 65 IN | WEIGHT: 202 LBS | BODY MASS INDEX: 33.66 KG/M2 | DIASTOLIC BLOOD PRESSURE: 68 MMHG

## 2023-03-13 DIAGNOSIS — Z12.4 PAP SMEAR FOR CERVICAL CANCER SCREENING: ICD-10-CM

## 2023-03-13 DIAGNOSIS — Z12.31 VISIT FOR SCREENING MAMMOGRAM: ICD-10-CM

## 2023-03-13 DIAGNOSIS — Z01.419 ENCOUNTER FOR GYNECOLOGICAL EXAMINATION WITHOUT ABNORMAL FINDING: Primary | ICD-10-CM

## 2023-03-13 DIAGNOSIS — N94.12 DEEP DYSPAREUNIA IN FEMALE: ICD-10-CM

## 2023-03-13 PROBLEM — K52.832 LYMPHOCYTIC COLITIS: Status: ACTIVE | Noted: 2023-02-12

## 2023-03-13 PROCEDURE — 77067 SCR MAMMO BI INCL CAD: CPT | Mod: TC | Performed by: RADIOLOGY

## 2023-03-13 PROCEDURE — 87624 HPV HI-RISK TYP POOLED RSLT: CPT

## 2023-03-13 PROCEDURE — G0145 SCR C/V CYTO,THINLAYER,RESCR: HCPCS

## 2023-03-13 PROCEDURE — 99213 OFFICE O/P EST LOW 20 MIN: CPT | Performed by: STUDENT IN AN ORGANIZED HEALTH CARE EDUCATION/TRAINING PROGRAM

## 2023-03-13 PROCEDURE — 77063 BREAST TOMOSYNTHESIS BI: CPT | Mod: TC | Performed by: RADIOLOGY

## 2023-03-13 RX ORDER — ESTRADIOL 0.1 MG/G
2 CREAM VAGINAL
Qty: 42.5 G | Refills: 3 | Status: SHIPPED | OUTPATIENT
Start: 2023-03-13

## 2023-03-13 ASSESSMENT — ANXIETY QUESTIONNAIRES
5. BEING SO RESTLESS THAT IT IS HARD TO SIT STILL: NOT AT ALL
7. FEELING AFRAID AS IF SOMETHING AWFUL MIGHT HAPPEN: NOT AT ALL
1. FEELING NERVOUS, ANXIOUS, OR ON EDGE: NOT AT ALL
3. WORRYING TOO MUCH ABOUT DIFFERENT THINGS: NOT AT ALL
2. NOT BEING ABLE TO STOP OR CONTROL WORRYING: NOT AT ALL
GAD7 TOTAL SCORE: 0
GAD7 TOTAL SCORE: 0
6. BECOMING EASILY ANNOYED OR IRRITABLE: NOT AT ALL
IF YOU CHECKED OFF ANY PROBLEMS ON THIS QUESTIONNAIRE, HOW DIFFICULT HAVE THESE PROBLEMS MADE IT FOR YOU TO DO YOUR WORK, TAKE CARE OF THINGS AT HOME, OR GET ALONG WITH OTHER PEOPLE: NOT DIFFICULT AT ALL

## 2023-03-13 ASSESSMENT — PATIENT HEALTH QUESTIONNAIRE - PHQ9
5. POOR APPETITE OR OVEREATING: NOT AT ALL
SUM OF ALL RESPONSES TO PHQ QUESTIONS 1-9: 1

## 2023-03-15 LAB
BKR LAB AP GYN ADEQUACY: NORMAL
BKR LAB AP GYN INTERPRETATION: NORMAL
BKR LAB AP HPV REFLEX: NORMAL
BKR LAB AP PREVIOUS ABNORMAL: NORMAL
PATH REPORT.COMMENTS IMP SPEC: NORMAL
PATH REPORT.COMMENTS IMP SPEC: NORMAL
PATH REPORT.RELEVANT HX SPEC: NORMAL

## 2023-03-17 LAB
HUMAN PAPILLOMA VIRUS 16 DNA: NEGATIVE
HUMAN PAPILLOMA VIRUS 18 DNA: NEGATIVE
HUMAN PAPILLOMA VIRUS FINAL DIAGNOSIS: NORMAL
HUMAN PAPILLOMA VIRUS OTHER HR: NEGATIVE

## 2023-03-20 ENCOUNTER — VIRTUAL VISIT (OUTPATIENT)
Dept: URGENT CARE | Facility: CLINIC | Age: 63
End: 2023-03-20
Payer: COMMERCIAL

## 2023-03-20 DIAGNOSIS — U07.1 CLINICAL DIAGNOSIS OF COVID-19: Primary | ICD-10-CM

## 2023-03-20 PROCEDURE — 99213 OFFICE O/P EST LOW 20 MIN: CPT | Mod: CS

## 2023-03-20 NOTE — PROGRESS NOTES
"Charu is a 62 year old who is being evaluated via a billable telephone visit.      Tested + 3/18.  Sx started 3/17.  Body aches, sneezing, runny nose, HA.  COVID vaccinated and boosted.  Second time getting COVID- has used Paxlovid in past with good results.    What phone number would you like to be contacted at? cell  How would you like to obtain your AVS? MyChart    Distant Location (provider location):  Off-site    Assessment & Plan     Clinical diagnosis of COVID-19    - nirmatrelvir and ritonavir (PAXLOVID) 300 mg/100 mg therapy pack; Take 3 tablets by mouth 2 times daily for 5 days (Take 2 Nirmatrelvir tablets and 1 Ritonavir tablet twice daily for 5 days)[id number]}     COVID-19 positive patient.  Encounter for consideration of medication intervention. Patient does qualify for a prescription. Full discussion with patient including medication options, risks and benefits. Potential drug interactions reviewed with patient.     Treatment Planned [facility]    Temporary change to home medications:  None     Estimated body mass index is 34.14 kg/m  as calculated from the following:    Height as of 3/13/23: 1.638 m (5' 4.5\").    Weight as of 3/13/23: 91.6 kg (202 lb).  GFR Estimate   Date Value Ref Range Status   03/03/2023 87 >60 mL/min/1.73m2 Final     Comment:     eGFR calculated using 2021 CKD-EPI equation.   03/02/2021 78 >60 mL/min/[1.73_m2] Final     Comment:     Non  GFR Calc  Starting 12/18/2018, serum creatinine based estimated GFR (eGFR) will be   calculated using the Chronic Kidney Disease Epidemiology Collaboration   (CKD-EPI) equation.       Maria Isabel Soni MD  Virtual Urgent Care  [facility] VIRTUAL URGENT CARE    Subjective   Charu is a 62 year old, presenting for the following health issues:  No chief complaint on file.      HPI       Tested + 3/18.  Sx started 3/17.  Body aches, sneezing, runny nose, HA.  COVID vaccinated and boosted.  Second time " getting COVID- has used Paxlovid in past with good results.      Review of Systems   Constitutional, HEENT, cardiovascular, pulmonary, GI, , musculoskeletal, neuro, skin, endocrine and psych systems are negative, except as otherwise noted.      Objective           Vitals:  No vitals were obtained today due to virtual visit.    Physical Exam   healthy, alert and no distress  PSYCH: Alert and oriented times 3; coherent speech, normal   rate and volume, able to articulate logical thoughts, able   to abstract reason, no tangential thoughts, no hallucinations   or delusions  Her affect is normal and pleasant  RESP: No cough, no audible wheezing, able to talk in full sentences  Remainder of exam unable to be completed due to telephone visits        Phone call duration: 7 minutes

## 2023-08-01 DIAGNOSIS — E03.4 HYPOTHYROIDISM DUE TO ACQUIRED ATROPHY OF THYROID: ICD-10-CM

## 2023-08-01 RX ORDER — LEVOTHYROXINE SODIUM 150 UG/1
TABLET ORAL
Qty: 90 TABLET | Refills: 0 | Status: SHIPPED | OUTPATIENT
Start: 2023-08-01 | End: 2023-11-02

## 2023-10-02 ENCOUNTER — TRANSFERRED RECORDS (OUTPATIENT)
Dept: HEALTH INFORMATION MANAGEMENT | Facility: CLINIC | Age: 63
End: 2023-10-02
Payer: COMMERCIAL

## 2023-10-02 LAB
ALT SERPL-CCNC: 30 IU/L (ref 5–35)
AST SERPL-CCNC: 28 U/L (ref 5–34)
CREATININE (EXTERNAL): 0.85 MG/DL (ref 0.5–1.3)
GFR ESTIMATED (EXTERNAL): 72.3 ML/MIN/1.73M2

## 2023-10-06 ENCOUNTER — DOCUMENTATION ONLY (OUTPATIENT)
Dept: INTERNAL MEDICINE | Facility: CLINIC | Age: 63
End: 2023-10-06
Payer: COMMERCIAL

## 2023-10-06 NOTE — PROGRESS NOTES
Cherry Kim has an upcoming lab appointment:    Future Appointments   Date Time Provider Department Center   10/9/2023  2:30 PM CS LAB [facility]     Patient is scheduled for the following lab(s): pt is requesting labs. Please order if necessary. Thank you     There is no order available. Please review and place either future orders or HMPO (Review of Health Maintenance Protocol Orders), as appropriate.    There are no preventive care reminders to display for this patient.  Dinah Browning

## 2023-10-09 ENCOUNTER — LAB (OUTPATIENT)
Dept: LAB | Facility: CLINIC | Age: 63
End: 2023-10-09
Payer: COMMERCIAL

## 2023-10-09 DIAGNOSIS — E03.4 HYPOTHYROIDISM DUE TO ACQUIRED ATROPHY OF THYROID: ICD-10-CM

## 2023-10-09 LAB — TSH SERPL DL<=0.005 MIU/L-ACNC: 2.45 UIU/ML (ref 0.3–4.2)

## 2023-10-09 PROCEDURE — 84443 ASSAY THYROID STIM HORMONE: CPT

## 2023-10-09 PROCEDURE — 36415 COLL VENOUS BLD VENIPUNCTURE: CPT

## 2023-11-02 DIAGNOSIS — E03.4 HYPOTHYROIDISM DUE TO ACQUIRED ATROPHY OF THYROID: ICD-10-CM

## 2023-11-02 RX ORDER — LEVOTHYROXINE SODIUM 150 UG/1
TABLET ORAL
Qty: 90 TABLET | Refills: 0 | Status: SHIPPED | OUTPATIENT
Start: 2023-11-02 | End: 2024-01-29

## 2024-01-04 DIAGNOSIS — B00.9 HSV INFECTION: ICD-10-CM

## 2024-01-04 RX ORDER — VALACYCLOVIR HYDROCHLORIDE 500 MG/1
500 TABLET, FILM COATED ORAL DAILY
Qty: 90 TABLET | Refills: 0 | Status: SHIPPED | OUTPATIENT
Start: 2024-01-04 | End: 2024-03-23

## 2024-01-29 DIAGNOSIS — E03.4 HYPOTHYROIDISM DUE TO ACQUIRED ATROPHY OF THYROID: ICD-10-CM

## 2024-01-29 RX ORDER — LEVOTHYROXINE SODIUM 150 UG/1
TABLET ORAL
Qty: 90 TABLET | Refills: 1 | Status: SHIPPED | OUTPATIENT
Start: 2024-01-29 | End: 2024-04-05

## 2024-02-02 ENCOUNTER — PATIENT OUTREACH (OUTPATIENT)
Dept: CARE COORDINATION | Facility: CLINIC | Age: 64
End: 2024-02-02
Payer: COMMERCIAL

## 2024-02-16 ENCOUNTER — PATIENT OUTREACH (OUTPATIENT)
Dept: CARE COORDINATION | Facility: CLINIC | Age: 64
End: 2024-02-16
Payer: COMMERCIAL

## 2024-03-23 ENCOUNTER — MYC REFILL (OUTPATIENT)
Dept: INTERNAL MEDICINE | Facility: CLINIC | Age: 64
End: 2024-03-23
Payer: COMMERCIAL

## 2024-03-23 DIAGNOSIS — I10 ESSENTIAL HYPERTENSION, BENIGN: ICD-10-CM

## 2024-03-23 DIAGNOSIS — B00.9 HSV INFECTION: ICD-10-CM

## 2024-03-25 RX ORDER — SPIRONOLACTONE AND HYDROCHLOROTHIAZIDE 25; 25 MG/1; MG/1
1 TABLET ORAL DAILY
Qty: 90 TABLET | Refills: 0 | Status: SHIPPED | OUTPATIENT
Start: 2024-03-25 | End: 2024-04-05

## 2024-03-25 RX ORDER — VALACYCLOVIR HYDROCHLORIDE 500 MG/1
500 TABLET, FILM COATED ORAL DAILY
Qty: 30 TABLET | Refills: 0 | Status: SHIPPED | OUTPATIENT
Start: 2024-03-25 | End: 2024-04-05

## 2024-03-31 DIAGNOSIS — I10 ESSENTIAL HYPERTENSION, BENIGN: ICD-10-CM

## 2024-04-01 RX ORDER — METOPROLOL TARTRATE 50 MG
50 TABLET ORAL 2 TIMES DAILY
Qty: 120 TABLET | Refills: 0 | Status: SHIPPED | OUTPATIENT
Start: 2024-04-01 | End: 2024-04-05

## 2024-04-05 ENCOUNTER — OFFICE VISIT (OUTPATIENT)
Dept: INTERNAL MEDICINE | Facility: CLINIC | Age: 64
End: 2024-04-05
Payer: COMMERCIAL

## 2024-04-05 VITALS
SYSTOLIC BLOOD PRESSURE: 128 MMHG | RESPIRATION RATE: 16 BRPM | OXYGEN SATURATION: 98 % | HEART RATE: 64 BPM | WEIGHT: 203 LBS | HEIGHT: 65 IN | TEMPERATURE: 97.6 F | BODY MASS INDEX: 33.82 KG/M2 | DIASTOLIC BLOOD PRESSURE: 72 MMHG

## 2024-04-05 DIAGNOSIS — Z00.00 ROUTINE GENERAL MEDICAL EXAMINATION AT A HEALTH CARE FACILITY: Primary | ICD-10-CM

## 2024-04-05 DIAGNOSIS — M35.9 CONNECTIVE TISSUE DISORDER (H): ICD-10-CM

## 2024-04-05 DIAGNOSIS — E78.5 HYPERLIPIDEMIA LDL GOAL <130: ICD-10-CM

## 2024-04-05 DIAGNOSIS — E03.4 HYPOTHYROIDISM DUE TO ACQUIRED ATROPHY OF THYROID: ICD-10-CM

## 2024-04-05 DIAGNOSIS — K52.832 LYMPHOCYTIC COLITIS: ICD-10-CM

## 2024-04-05 DIAGNOSIS — I10 ESSENTIAL HYPERTENSION, BENIGN: ICD-10-CM

## 2024-04-05 DIAGNOSIS — B00.9 HSV INFECTION: ICD-10-CM

## 2024-04-05 PROCEDURE — 99214 OFFICE O/P EST MOD 30 MIN: CPT | Mod: 25 | Performed by: INTERNAL MEDICINE

## 2024-04-05 PROCEDURE — 80048 BASIC METABOLIC PNL TOTAL CA: CPT | Performed by: INTERNAL MEDICINE

## 2024-04-05 PROCEDURE — 99396 PREV VISIT EST AGE 40-64: CPT | Mod: 25 | Performed by: INTERNAL MEDICINE

## 2024-04-05 PROCEDURE — 36415 COLL VENOUS BLD VENIPUNCTURE: CPT | Performed by: INTERNAL MEDICINE

## 2024-04-05 PROCEDURE — 80061 LIPID PANEL: CPT | Performed by: INTERNAL MEDICINE

## 2024-04-05 PROCEDURE — 84443 ASSAY THYROID STIM HORMONE: CPT | Performed by: INTERNAL MEDICINE

## 2024-04-05 RX ORDER — VALACYCLOVIR HYDROCHLORIDE 500 MG/1
500 TABLET, FILM COATED ORAL DAILY
Qty: 90 TABLET | Refills: 3 | Status: SHIPPED | OUTPATIENT
Start: 2024-04-05

## 2024-04-05 RX ORDER — SPIRONOLACTONE AND HYDROCHLOROTHIAZIDE 25; 25 MG/1; MG/1
1 TABLET ORAL DAILY
Qty: 90 TABLET | Refills: 3 | Status: SHIPPED | OUTPATIENT
Start: 2024-04-05

## 2024-04-05 RX ORDER — LEVOTHYROXINE SODIUM 150 UG/1
150 TABLET ORAL DAILY
Qty: 90 TABLET | Refills: 3 | Status: SHIPPED | OUTPATIENT
Start: 2024-04-05

## 2024-04-05 RX ORDER — METOPROLOL TARTRATE 50 MG
50 TABLET ORAL 2 TIMES DAILY
Qty: 180 TABLET | Refills: 3 | Status: SHIPPED | OUTPATIENT
Start: 2024-04-05

## 2024-04-05 SDOH — HEALTH STABILITY: PHYSICAL HEALTH: ON AVERAGE, HOW MANY DAYS PER WEEK DO YOU ENGAGE IN MODERATE TO STRENUOUS EXERCISE (LIKE A BRISK WALK)?: 3 DAYS

## 2024-04-05 SDOH — HEALTH STABILITY: PHYSICAL HEALTH: ON AVERAGE, HOW MANY MINUTES DO YOU ENGAGE IN EXERCISE AT THIS LEVEL?: 20 MIN

## 2024-04-05 ASSESSMENT — SOCIAL DETERMINANTS OF HEALTH (SDOH): HOW OFTEN DO YOU GET TOGETHER WITH FRIENDS OR RELATIVES?: ONCE A WEEK

## 2024-04-05 NOTE — PATIENT INSTRUCTIONS
Preventive Care Advice   This is general advice given by our system to help you stay healthy. However, your care team may have specific advice just for you. Please talk to your care team about your preventive care needs.  Nutrition  Eat 5 or more servings of fruits and vegetables each day.  Try wheat bread, brown rice and whole grain pasta (instead of white bread, rice, and pasta).  Get enough calcium and vitamin D. Check the label on foods and aim for 100% of the RDA (recommended daily allowance).  Lifestyle  Exercise at least 150 minutes each week   (30 minutes a day, 5 days a week).  Do muscle strengthening activities 2 days a week. These help control your weight and prevent disease.  No smoking.  Wear sunscreen to prevent skin cancer.  Have a dental exam and cleaning every 6 months.  Yearly exams  See your health care team every year to talk about:  Any changes in your health.  Any medicines your care team has prescribed.  Preventive care, family planning, and ways to prevent chronic diseases.  Shots (vaccines)   HPV shots (up to age 26), if you've never had them before.  Hepatitis B shots (up to age 59), if you've never had them before.  COVID-19 shot: Get this shot when it's due.  Flu shot: Get a flu shot every year.  Tetanus shot: Get a tetanus shot every 10 years.  Pneumococcal, hepatitis A, and RSV shots: Ask your care team if you need these based on your risk.  Shingles shot (for age 50 and up).  General health tests  Diabetes screening:  Starting at age 35, Get screened for diabetes at least every 3 years.  If you are younger than age 35, ask your care team if you should be screened for diabetes.  Cholesterol test: At age 39, start having a cholesterol test every 5 years, or more often if advised.  Bone density scan (DEXA): At age 50, ask your care team if you should have this scan for osteoporosis (brittle bones).  Hepatitis C: Get tested at least once in your life.  STIs (sexually transmitted  infections)  Before age 24: Ask your care team if you should be screened for STIs.  After age 24: Get screened for STIs if you're at risk. You are at risk for STIs (including HIV) if:  You are sexually active with more than one person.  You don't use condoms every time.  You or a partner was diagnosed with a sexually transmitted infection.  If you are at risk for HIV, ask about PrEP medicine to prevent HIV.  Get tested for HIV at least once in your life, whether you are at risk for HIV or not.  Cancer screening tests  Cervical cancer screening: If you have a cervix, begin getting regular cervical cancer screening tests at age 21. Most people who have regular screenings with normal results can stop after age 65. Talk about this with your provider.  Breast cancer scan (mammogram): If you've ever had breasts, begin having regular mammograms starting at age 40. This is a scan to check for breast cancer.  Colon cancer screening: It is important to start screening for colon cancer at age 45.  Have a colonoscopy test every 10 years (or more often if you're at risk) Or, ask your provider about stool tests like a FIT test every year or Cologuard test every 3 years.  To learn more about your testing options, visit: https://www.[facility]/208839.pdf.  For help making a decision, visit: https://bit.ly/io98256.  Prostate cancer screening test: If you have a prostate and are age 55 to 69, ask your provider if you would benefit from a yearly prostate cancer screening test.  Lung cancer screening: If you are a current or former smoker age 50 to 80, ask your care team if ongoing lung cancer screenings are right for you.  For informational purposes only. Not to replace the advice of your health care provider. Copyright   2023 [employer]. All rights reserved. Clinically reviewed by the [facility] Transitions Program. [employer] [id number] - REV 01/24.

## 2024-04-05 NOTE — PROGRESS NOTES
"Preventive Care Visit  [facility]  Krish Shannon MD, Internal Medicine  Apr 5, 2024      Assessment & Plan     Routine general medical examination at a health care facility  Updated screening, immunizations, prevention.  Please see health maintenance list, care gaps     Essential hypertension, benign  BP fairly well controlled  - BASIC METABOLIC PANEL; Future  - metoprolol tartrate (LOPRESSOR) 50 MG tablet; Take 1 tablet (50 mg) by mouth 2 times daily  - spironolactone-HCTZ (ALDACTAZIDE) 25-25 MG tablet; Take 1 tablet by mouth daily  - BASIC METABOLIC PANEL    Lymphocytic colitis  Using cholestyramine. Didn't feel budesonide did much.  Stools formed but frequent.   F/u GI prn      Hyperlipidemia LDL goal <130  Off statin due to   - Lipid panel reflex to direct LDL Non-fasting; Future  - Lipid panel reflex to direct LDL Non-fasting    Connective tissue disorder (H24)  Per rheumatology     HSV infection  - valACYclovir (VALTREX) 500 MG tablet; Take 1 tablet (500 mg) by mouth daily    Hypothyroidism due to acquired atrophy of thyroid  - levothyroxine (SYNTHROID/LEVOTHROID) 150 MCG tablet; Take 1 tablet (150 mcg) by mouth daily  - TSH with free T4 reflex; Future    Patient has been advised of split billing requirements and indicates understanding: Yes  Review of prior external note(s) from - Outside records from rheum, gi  Review of the result(s) of each unique test - labs  Ordering of each unique test  Prescription drug management        BMI  Estimated body mass index is 34.31 kg/m  as calculated from the following:    Height as of this encounter: 1.638 m (5' 4.5\").    Weight as of this encounter: 92.1 kg (203 lb).   Weight management plan: Discussed healthy diet and exercise guidelines    Counseling  Appropriate preventive services were discussed with this patient, including applicable screening as appropriate for fall prevention, nutrition, physical activity, Tobacco-use cessation, weight " loss and cognition.  Checklist reviewing preventive services available has been given to the patient.  Reviewed patient's diet, addressing concerns and/or questions.   She is at risk for lack of exercise and has been provided with information to increase physical activity for the benefit of her well-being.       Work on weight loss  Regular exercise  See Patient Instructions    Subjective   Charu is a 63 year old, presenting for the following:  Physical (Pt is fasting)         Health Care Directive  Patient has a Health Care Directive on file  Advance care planning document is on file and is current.    History of Present Illness       Hypertension: She presents for follow up of hypertension.  She does not check blood pressure  regularly outside of the clinic. Outpatient blood pressures have not been over 140/90. She does not follow a low salt diet.     Hypothyroidism:     Since last visit, patient describes the following symptoms::  Fatigue and Loose stools    She eats 2-3 servings of fruits and vegetables daily.She consumes 1 sweetened beverage(s) daily.She exercises with enough effort to increase her heart rate 10 to 19 minutes per day.  She exercises with enough effort to increase her heart rate 3 or less days per week.   She is taking medications regularly.              4/5/2024   General Health   How would you rate your overall physical health? Good   Feel stress (tense, anxious, or unable to sleep) Not at all         4/5/2024   Nutrition   Three or more servings of calcium each day? (!) NO   Diet: Gluten-free/reduced    Other   If other, please elaborate: no dairy   How many servings of fruit and vegetables per day? 4 or more   How many sweetened beverages each day? 0-1         4/5/2024   Exercise   Days per week of moderate/strenous exercise 3 days   Average minutes spent exercising at this level 20 min         4/5/2024   Social Factors   Frequency of gathering with friends or relatives Once a week   Worry  food won't last until get money to buy more No   Food not last or not have enough money for food? No   Do you have housing?  Yes   Are you worried about losing your housing? No   Lack of transportation? No   Unable to get utilities (heat,electricity)? No         4/5/2024   Fall Risk   Fallen 2 or more times in the past year? No   Trouble with walking or balance? No          4/5/2024   Dental   Dentist two times every year? Yes         4/5/2024   TB Screening   Were you born outside of the [place]? No         Today's PHQ-2 Score:       4/5/2024    12:03 PM   PHQ-2 ( 1999 [employer])   Q1: Little interest or pleasure in doing things 0   Q2: Feeling down, depressed or hopeless 0   PHQ-2 Score 0   Q1: Little interest or pleasure in doing things Not at all   Q2: Feeling down, depressed or hopeless Not at all   PHQ-2 Score 0           4/5/2024   Substance Use   Alcohol more than 3/day or more than 7/wk No   Do you use any other substances recreationally? No     Social History     Tobacco Use    Smoking status: Never    Smokeless tobacco: Never   Substance Use Topics    Alcohol use: Yes     Comment: 1/ day    Drug use: No           3/13/2023   LAST FHS-7 RESULTS   1st degree relative breast or ovarian cancer Yes   Any relative bilateral breast cancer No   Any male have breast cancer No   Any ONE woman have BOTH breast AND ovarian cancer No   Any woman with breast cancer before 50yrs No   2 or more relatives with breast AND/OR ovarian cancer Yes   2 or more relatives with breast AND/OR bowel cancer Yes                4/5/2024   STI Screening   New sexual partner(s) since last STI/HIV test? No     History of abnormal Pap smear: NO - age 30-65 PAP every 5 years with negative HPV co-testing recommended        Latest Ref Rng & Units 3/13/2023    11:31 AM 7/31/2018     4:22 PM 7/31/2018     4:15 PM   PAP / HPV   PAP  Negative for Intraepithelial Lesion or Malignancy (NILM)      PAP (Historical)   NIL     HPV 16 DNA Negative Negative    "Negative    HPV 18 DNA Negative Negative   Negative    Other HR HPV Negative Negative   Negative      ASCVD Risk   The 10-year ASCVD risk score (Silvino MITCHELL, et al., 2019) is: 5.3%    Values used to calculate the score:      Age: 63 years      Sex: Female      Is Non- : No      Diabetic: No      Tobacco smoker: No      Systolic Blood Pressure: 128 mmHg      Is BP treated: Yes      HDL Cholesterol: 53 mg/dL      Total Cholesterol: 186 mg/dL           Reviewed and updated as needed this visit by Provider        Surg Hx  Fam Hx                     Objective    Exam  /72   Pulse 64   Temp 97.6  F (36.4  C) (Temporal)   Resp 16   Ht 1.638 m (5' 4.5\")   Wt 92.1 kg (203 lb)   LMP 06/27/2007   SpO2 98%   BMI 34.31 kg/m     Estimated body mass index is 34.31 kg/m  as calculated from the following:    Height as of this encounter: 1.638 m (5' 4.5\").    Weight as of this encounter: 92.1 kg (203 lb).    Physical Exam  GENERAL: alert and no distress  EYES: Eyes grossly normal to inspection, PERRL and conjunctivae and sclerae normal  HENT: ear canals and TM's normal, nose and mouth without ulcers or lesions  NECK: no adenopathy, no asymmetry, masses, or scars  RESP: lungs clear to auscultation - no rales, rhonchi or wheezes  CV: regular rate and rhythm, normal S1 S2, no S3 or S4, no murmur, click or rub, no peripheral edema  ABDOMEN: soft, nontender, no hepatosplenomegaly, no masses and bowel sounds normal  MS: no gross musculoskeletal defects noted, no edema  SKIN: no suspicious lesions or rashes  NEURO: Normal strength and tone, mentation intact and speech normal  PSYCH: mentation appears normal, affect normal/bright  LYMPH: no cervical, supraclavicular, axillary, or inguinal adenopathy        Signed Electronically by: Krish Shannon MD    "

## 2024-04-06 LAB
ANION GAP SERPL CALCULATED.3IONS-SCNC: 10 MMOL/L (ref 7–15)
BUN SERPL-MCNC: 9.1 MG/DL (ref 8–23)
CALCIUM SERPL-MCNC: 10.1 MG/DL (ref 8.8–10.2)
CHLORIDE SERPL-SCNC: 98 MMOL/L (ref 98–107)
CHOLEST SERPL-MCNC: 214 MG/DL
CREAT SERPL-MCNC: 0.82 MG/DL (ref 0.51–0.95)
DEPRECATED HCO3 PLAS-SCNC: 28 MMOL/L (ref 22–29)
EGFRCR SERPLBLD CKD-EPI 2021: 80 ML/MIN/1.73M2
FASTING STATUS PATIENT QL REPORTED: YES
GLUCOSE SERPL-MCNC: 87 MG/DL (ref 70–99)
HDLC SERPL-MCNC: 54 MG/DL
LDLC SERPL CALC-MCNC: 137 MG/DL
NONHDLC SERPL-MCNC: 160 MG/DL
POTASSIUM SERPL-SCNC: 4 MMOL/L (ref 3.4–5.3)
SODIUM SERPL-SCNC: 136 MMOL/L (ref 135–145)
TRIGL SERPL-MCNC: 114 MG/DL
TSH SERPL DL<=0.005 MIU/L-ACNC: 4.17 UIU/ML (ref 0.3–4.2)

## 2024-04-15 ENCOUNTER — TRANSFERRED RECORDS (OUTPATIENT)
Dept: HEALTH INFORMATION MANAGEMENT | Facility: CLINIC | Age: 64
End: 2024-04-15
Payer: COMMERCIAL

## 2024-04-15 LAB
ALT SERPL-CCNC: 30 IU/L (ref 5–35)
AST SERPL-CCNC: 31 U/L (ref 5–34)
CREATININE (EXTERNAL): 0.83 MG/DL (ref 0.5–1.3)

## 2024-05-16 ENCOUNTER — ANCILLARY PROCEDURE (OUTPATIENT)
Dept: MAMMOGRAPHY | Facility: CLINIC | Age: 64
End: 2024-05-16
Attending: INTERNAL MEDICINE
Payer: COMMERCIAL

## 2024-05-16 DIAGNOSIS — Z12.31 VISIT FOR SCREENING MAMMOGRAM: ICD-10-CM

## 2024-05-16 PROCEDURE — 77063 BREAST TOMOSYNTHESIS BI: CPT | Mod: TC | Performed by: RADIOLOGY

## 2024-05-16 PROCEDURE — 77067 SCR MAMMO BI INCL CAD: CPT | Mod: TC | Performed by: RADIOLOGY

## 2024-06-11 ENCOUNTER — TRANSFERRED RECORDS (OUTPATIENT)
Dept: HEALTH INFORMATION MANAGEMENT | Facility: CLINIC | Age: 64
End: 2024-06-11
Payer: COMMERCIAL

## 2024-10-23 ENCOUNTER — TRANSFERRED RECORDS (OUTPATIENT)
Dept: HEALTH INFORMATION MANAGEMENT | Facility: CLINIC | Age: 64
End: 2024-10-23
Payer: COMMERCIAL

## 2024-10-23 NOTE — PROGRESS NOTES
DDx:esophageal motility disorder, achalasia,  HO, Stricture or ,Zenker diverticulum or Schatzki Ring   Visit #:  9    Subjective:  Cherry Kim is a 58 year old female who is seen in f/u up for:        Somatic dysfunction of lumbar region  Chronic bilateral low back pain without sciatica  Somatic dysfunction of sacral region  Pain in the coccyx  Thoracic segment dysfunction.     Since last visit ,   Cherry Kim reports:    Area of chief complaint:  Lumbar :  Symptoms are graded at 4/10. The quality is described as stiff, achey, dull.  Motion has increased, but is still not normal. The pt reports 50% improvement since initial presentation. She notes some good days and some sore days. She states the soreness she will feel in the coccyx may relate to how much she strained while going to the bathroom in addition to stress. Increased activities will increase the tailbone pain. She has been under stress this weekend. The pt denies weakness or other unusual sx.       Since last visit the patient feels that they are less than  50 percent  improved from last visit-no change        Objective:  The following was observed:    P: palpatory tenderness Gluteal, Lumbar erector spine and Quad lumb Bilaterally  A: static palpation demonstrates intersegmental asymmetry   R: motion palpation notes restricted motion  T: hypertonicity at: Gluteal, Lumbar erector spine and Quad lumb Bilaterally    Segmental spinal dysfunction/restrictions found at:  T6 , T9 , L5 , Sacrum  and PSIS Right       Assessment:    Diagnoses:      1. Somatic dysfunction of lumbar region    2. Chronic bilateral low back pain without sciatica    3. Somatic dysfunction of sacral region    4. Pain in the coccyx    5. Thoracic segment dysfunction        Patient's condition:  No change     Treatment effectiveness:  Post manipulation there is better intersegmental movement and Patient claims to feel looser post manipulation      Procedures:  CMT:  86875 Chiropractic manipulative treatment 3-4 regions performed   Thoracic: Diversified, T5, T9,  Prone  Lumbar: Diversified, L5, Side posture  Pelvis: Drop Table, Sacrum , PSIS Right , Prone    Modalities:  79664: Acupuncture, for 15 minutes:  Points: Ilda Points in lumbar and sacral spine  Ahsi point in hips and legs  For 15 minutes    Therapeutic procedures:  None     Response to Treatment  Reduction in symptoms as reported by patient    Prognosis: Good    Progress towards Goals: Patient is making progress towards the goal.  Goals:  SLEEPING: the patient specific goal is to attain his pre-injury status of 6-7 hours comfortably  SITTING: the patient specific goal is to attain pre-injury status of  8 hours comfortably         Recommendations:    Instructions:  continue to use the lumbar support     Follow-up:    Return to care in 1 week with ACP.

## 2024-11-26 ENCOUNTER — ANCILLARY PROCEDURE (OUTPATIENT)
Dept: GENERAL RADIOLOGY | Facility: CLINIC | Age: 64
End: 2024-11-26
Attending: INTERNAL MEDICINE
Payer: COMMERCIAL

## 2024-11-26 DIAGNOSIS — M25.562 ACUTE PAIN OF LEFT KNEE: ICD-10-CM

## 2024-11-26 PROCEDURE — 73560 X-RAY EXAM OF KNEE 1 OR 2: CPT | Mod: TC | Performed by: RADIOLOGY

## 2024-11-27 NOTE — RESULT ENCOUNTER NOTE
Jeremiah Carey,    I have had the opportunity to review your recent results and an interpretation is as follows:  Your X-ray shows mild patellofemoral arthritis and medial degenerative joint disease and I would recommend follow up in sports medicine as we discussed      Sincerely,  Ever Taylor MD

## 2024-11-27 NOTE — TELEPHONE ENCOUNTER
DIAGNOSIS: Acute pain of left knee/Dr. Ever Taylor/[facility]/Xrays done 11/26/24 at [facility] 11/27/24     APPOINTMENT DATE: 12.4.24   NOTES STATUS DETAILS   OFFICE NOTE from referring provider Internal 11.25.24  Claudia MARTELL   MEDICATION LIST Internal    XRAYS (IMAGES & REPORTS) Internal 11.25.24  XR Knee Left

## 2024-12-04 ENCOUNTER — PRE VISIT (OUTPATIENT)
Dept: ORTHOPEDICS | Facility: CLINIC | Age: 64
End: 2024-12-04
Payer: COMMERCIAL

## 2024-12-06 NOTE — PROGRESS NOTES
CHIEF COMPLAINT:  Pain of the Left Knee     HISTORY OF PRESENT ILLNESS  Ms. Pravin Kim is a pleasant 64 year old year old female who presents to clinic today with left knee pain.  Cherry explains that she hit her knee on a metal cash drawer at work. Her pain has improved but with the cold weather she feels as if it has improved. She hit her knee on November 11th, 2024. Patient also has connective tissue disease.     Onset: sudden  Location: left knee  Quality:  dull  Duration: 1 months   Severity: 5/10 at worst  Timing:intermittent episodes with stairs/walking  Modifying factors:  resting and non-use makes it better, movement and use makes it worse  Associated signs & symptoms: pain and popping  Previous similar pain: No  Treatments to date: Compression sleeve     Additional history: as documented    Review of Systems: A 10-point review of systems was obtained and is negative except for as noted in the HPI.     MEDICAL HISTORY  Patient Active Problem List   Diagnosis    Hypothyroidism    Proteinuria    Essential hypertension, benign    Family history of malignant neoplasm of breast    Crustacean allergy    Obesity    Other specified disorder of rectum and anus    Hemorrhoids    Connective tissue disorder (H)    Impaired fasting glucose    Hyperlipidemia LDL goal <130    Hallux abducto valgus    Somatic dysfunction of pelvis region    Pain in the coccyx    Somatic dysfunction of lumbar region    Lumbago    Somatic dysfunction of sacral region    Thoracic segment dysfunction    Lymphocytic colitis       Current Outpatient Medications   Medication Sig Dispense Refill    cholestyramine (QUESTRAN) 4 GM/DOSE powder TAKE 1 SCOOP BY MOUTH 2 TIMES EVERY DAY DISSOLVED IN 2-6 OUNCES OF WATER OR NONCARBONATED BEVERAGE      EPINEPHrine (EPIPEN/ADRENACLICK/OR ANY BX GENERIC EQUIV) 0.3 MG/0.3ML injection 2-pack Inject 0.3 mLs (0.3 mg) into the muscle as needed for anaphylaxis 0.6 mL 11    estradiol (ESTRACE) 0.1 MG/GM  vaginal cream Place 2 g vaginally three times a week 42.5 g 3    HYDROXYCHLOROQUINE SULFATE 200 MG PO TABS 1 TABLET DAILY      Iodoquinol-HC (HYDROCORTISONE-IODOQUINOL) 1-1 % CREA Externally apply  topically daily as needed.      levothyroxine (SYNTHROID/LEVOTHROID) 150 MCG tablet Take 1 tablet (150 mcg) by mouth daily 90 tablet 3    metoprolol tartrate (LOPRESSOR) 50 MG tablet Take 1 tablet (50 mg) by mouth 2 times daily 180 tablet 3    spironolactone-HCTZ (ALDACTAZIDE) 25-25 MG tablet Take 1 tablet by mouth daily 90 tablet 3    Vaginal Lubricant (REPLENS) GEL Place vaginally as needed      valACYclovir (VALTREX) 500 MG tablet Take 1 tablet (500 mg) by mouth daily 90 tablet 3       Allergies   Allergen Reactions    Lisinopril Anaphylaxis    Ciprofloxacin Diarrhea    Codeine Nausea    Lisinopril Anaphylaxis     angioedema    Morphine And Codeine      severe nausea    Shellfish Allergy Nausea and Vomiting     Hives on neck    Sulfa Antibiotics      hives  Other reaction(s): Hives       Family History   Problem Relation Age of Onset    Breast Cancer Mother         At 55 yrs    Hypertension Mother     Diabetes Mother         Mid forties    C.A.D. Mother          of CHF,  at 58yrs of ? MI    Connective Tissue Disorder Mother         Lupus    Respiratory Father         d. 79 from complications of pneumonia    Cancer Father         Lung Cancer    Other Cancer Father     Thyroid Disease Father     Mitral valve prolapse Sister         possible    No Known Problems Brother     Cancer Brother         brain cancer      LUNG DISEASE Brother         COPD- lung transplant    No Known Problems Maternal Grandmother     Cancer Maternal Grandfather         Stomach cancer in his 40s    No Known Problems Paternal Grandmother     Cancer Maternal Aunt         vaginal    Cancer Maternal Aunt         cervical    Cancer Maternal Aunt         Rectal cancer    No Known Problems Other        Additional medical/Social/Surgical  histories reviewed in EPIC and updated as appropriate.       PHYSICAL EXAM  LMP 06/27/2007     General  - normal appearance, in no obvious distress  Musculoskeletal - left knee  - stance: normal gait without limp, normal double leg squat.  - inspection: no swelling or effusion, normal bone and joint alignment, no obvious deformity  - palpation: no joint line tenderness, Tender at medial femoral condyle, VMO and medial patellar facet  - ROM: 135 degrees flexion, 0 degrees extension, not painful, normal actively and passively compared to contralateral  - strength: 5/5 in flexion, 5/5 in extension  - special tests:  (-) Lachman  (-) Zen  (-) varus at 0 and 30 degrees flexion  (-) valgus at 0 and 30 degrees flexion  Neuro  - no sensory or motor deficit, grossly normal coordination, normal muscle tone  Skin  - no ecchymosis, erythema, warmth, or induration, no obvious rash      IMAGING : Left knee xray 3 views. Final results and radiologist's interpretation, available in the [facility] health record. Images were reviewed with the patient/family members in the office today. My personal interpretation of the performed imaging is no acute osseous abnormality or significant degenerative changes of joint.     ASSESSMENT & PLAN  Ms. Pravin Kim is a 64 year old year old female who presents to clinic today with left knee pain over the past month since striking knee on cash drawer while at work.    Suspected knee contusion of patellofemoral joint and medial knee. Discussed painless clicking likely due to patellofemoral arthritis with exacerbation. Less likely loose body or meniscal pathology. Negative special tests, no ligamentous laxity. Reassuring exam.    Diagnosis: Contusion of left knee, primary osteoarthritis of left knee.    Reassurance provided and discussed timeframe for contusions involving bone. Continue conservative care for left knee including voltaren gel, icing, and knee sleeve. Start HEP for gentle knee  conditioning and quad activation.  Consider formal physical therapy, reevaluation if persisting in 6 weeks.    It was a pleasure seeing Cherry today.    Guru Morrow DO, CAQSM  Primary Care Sports Medicine

## 2024-12-12 ENCOUNTER — OFFICE VISIT (OUTPATIENT)
Dept: ORTHOPEDICS | Facility: CLINIC | Age: 64
End: 2024-12-12
Attending: INTERNAL MEDICINE
Payer: OTHER MISCELLANEOUS

## 2024-12-12 DIAGNOSIS — M25.562 ACUTE PAIN OF LEFT KNEE: ICD-10-CM

## 2024-12-12 DIAGNOSIS — M17.12 PRIMARY OSTEOARTHRITIS OF LEFT KNEE: Primary | ICD-10-CM

## 2024-12-12 NOTE — LETTER
12/12/2024      Cherry Kim  [address] [address]      Dear Colleague,    Thank you for referring your patient, Cherry Kim, to the [facility] [place]. Please see a copy of my visit note below.    CHIEF COMPLAINT:  Pain of the Left Knee     HISTORY OF PRESENT ILLNESS  Ms. Pravin Kim is a pleasant 64 year old year old female who presents to clinic today with left knee pain.  Cherry explains that she hit her knee on a metal cash drawer at work. Her pain has improved but with the cold weather she feels as if it has improved. She hit her knee on November 11th, 2024. Patient also has connective tissue disease.     Onset: sudden  Location: left knee  Quality:  dull  Duration: 1 months   Severity: 5/10 at worst  Timing:intermittent episodes with stairs/walking  Modifying factors:  resting and non-use makes it better, movement and use makes it worse  Associated signs & symptoms: pain and popping  Previous similar pain: No  Treatments to date: Compression sleeve     Additional history: as documented    Review of Systems: A 10-point review of systems was obtained and is negative except for as noted in the HPI.     MEDICAL HISTORY  Patient Active Problem List   Diagnosis     Hypothyroidism     Proteinuria     Essential hypertension, benign     Family history of malignant neoplasm of breast     Crustacean allergy     Obesity     Other specified disorder of rectum and anus     Hemorrhoids     Connective tissue disorder (H)     Impaired fasting glucose     Hyperlipidemia LDL goal <130     Hallux abducto valgus     Somatic dysfunction of pelvis region     Pain in the coccyx     Somatic dysfunction of lumbar region     Lumbago     Somatic dysfunction of sacral region     Thoracic segment dysfunction     Lymphocytic colitis       Current Outpatient Medications   Medication Sig Dispense Refill     cholestyramine (QUESTRAN) 4 GM/DOSE powder TAKE 1 SCOOP  BY MOUTH 2 TIMES EVERY DAY DISSOLVED IN 2-6 OUNCES OF WATER OR NONCARBONATED BEVERAGE       EPINEPHrine (EPIPEN/ADRENACLICK/OR ANY BX GENERIC EQUIV) 0.3 MG/0.3ML injection 2-pack Inject 0.3 mLs (0.3 mg) into the muscle as needed for anaphylaxis 0.6 mL 11     estradiol (ESTRACE) 0.1 MG/GM vaginal cream Place 2 g vaginally three times a week 42.5 g 3     HYDROXYCHLOROQUINE SULFATE 200 MG PO TABS 1 TABLET DAILY       Iodoquinol-HC (HYDROCORTISONE-IODOQUINOL) 1-1 % CREA Externally apply  topically daily as needed.       levothyroxine (SYNTHROID/LEVOTHROID) 150 MCG tablet Take 1 tablet (150 mcg) by mouth daily 90 tablet 3     metoprolol tartrate (LOPRESSOR) 50 MG tablet Take 1 tablet (50 mg) by mouth 2 times daily 180 tablet 3     spironolactone-HCTZ (ALDACTAZIDE) 25-25 MG tablet Take 1 tablet by mouth daily 90 tablet 3     Vaginal Lubricant (REPLENS) GEL Place vaginally as needed       valACYclovir (VALTREX) 500 MG tablet Take 1 tablet (500 mg) by mouth daily 90 tablet 3       Allergies   Allergen Reactions     Lisinopril Anaphylaxis     Ciprofloxacin Diarrhea     Codeine Nausea     Lisinopril Anaphylaxis     angioedema     Morphine And Codeine      severe nausea     Shellfish Allergy Nausea and Vomiting     Hives on neck     Sulfa Antibiotics      hives  Other reaction(s): Hives       Family History   Problem Relation Age of Onset     Breast Cancer Mother         At 55 yrs     Hypertension Mother      Diabetes Mother         Mid forties     C.A.D. Mother          of CHF,  at 58yrs of ? MI     Connective Tissue Disorder Mother         Lupus     Respiratory Father         d. 79 from complications of pneumonia     Cancer Father         Lung Cancer     Other Cancer Father      Thyroid Disease Father      Mitral valve prolapse Sister         possible     No Known Problems Brother      Cancer Brother         brain cancer       LUNG DISEASE Brother         COPD- lung transplant     No Known Problems Maternal  Grandmother      Cancer Maternal Grandfather         Stomach cancer in his 40s     No Known Problems Paternal Grandmother      Cancer Maternal Aunt         vaginal     Cancer Maternal Aunt         cervical     Cancer Maternal Aunt         Rectal cancer     No Known Problems Other        Additional medical/Social/Surgical histories reviewed in [facility] and updated as appropriate.       PHYSICAL EXAM  LMP 06/27/2007     General  - normal appearance, in no obvious distress  Musculoskeletal - left knee  - stance: normal gait without limp, normal double leg squat.  - inspection: no swelling or effusion, normal bone and joint alignment, no obvious deformity  - palpation: no joint line tenderness, Tender at medial femoral condyle, VMO and medial patellar facet  - ROM: 135 degrees flexion, 0 degrees extension, not painful, normal actively and passively compared to contralateral  - strength: 5/5 in flexion, 5/5 in extension  - special tests:  (-) Lachman  (-) Zen  (-) varus at 0 and 30 degrees flexion  (-) valgus at 0 and 30 degrees flexion  Neuro  - no sensory or motor deficit, grossly normal coordination, normal muscle tone  Skin  - no ecchymosis, erythema, warmth, or induration, no obvious rash      IMAGING : Left knee xray 3 views. Final results and radiologist's interpretation, available in the [facility] health record. Images were reviewed with the patient/family members in the office today. My personal interpretation of the performed imaging is no acute osseous abnormality or significant degenerative changes of joint.     ASSESSMENT & PLAN  Ms. Pravin Kim is a 64 year old year old female who presents to clinic today with left knee pain over the past month since striking knee on cash drawer while at work.    Suspected knee contusion of patellofemoral joint and medial knee. Discussed painless clicking likely due to patellofemoral arthritis with exacerbation. Less likely loose body or meniscal pathology. Negative  special tests, no ligamentous laxity. Reassuring exam.    Diagnosis: Contusion of left knee, primary osteoarthritis of left knee.    Reassurance provided and discussed timeframe for contusions involving bone. Continue conservative care for left knee including voltaren gel, icing, and knee sleeve. Start HEP for gentle knee conditioning and quad activation.  Consider formal physical therapy, reevaluation if persisting in 6 weeks.    It was a pleasure seeing Cherry today.    Guru Morrow DO, [facility]  Primary Care Sports Medicine      Again, thank you for allowing me to participate in the care of your patient.        Sincerely,        Guru Morrow DO

## 2024-12-12 NOTE — PATIENT INSTRUCTIONS
Chondromalacia / Patellofemoral Pain    Standing hamstring stretch: Put the heel of the leg on your injured side on a stool about 15 inches high. Keep your leg straight. Lean forward, bending at the hips, until you feel a mild stretch in the back of your thigh. Make sure you don't roll your shoulders or bend at the waist when doing this or you will stretch your lower back instead of your leg. Hold the stretch for 15 to 30 seconds. Repeat 3 times.    Quadriceps stretch: Stand at an arm's length away from the wall with your injured side farthest from the wall. Facing straight ahead, brace yourself by keeping one hand against the wall. With your other hand, grasp the ankle on your injured side and pull your heel toward your buttocks. Don't arch or twist your back. Keep your knees together. Hold this stretch for 15 to 30 seconds.    Side-lying leg lift: Lie on your uninjured side. Tighten the front thigh muscles on your injured leg and lift that leg 8 to 10 inches (20 to 25 centimeters) away from the other leg. Keep the leg straight and lower it slowly. Do 2 sets of 15.    Quad sets: Sit on the floor with your injured leg straight and your other leg bent. Press the back of the knee of your injured leg against the floor by tightening the muscles on the top of your thigh. Hold this position 10 seconds. Relax. Do 2 sets of 15.  Straight leg raise: Lie on your back with your legs straight out in front of you. Bend the knee on your uninjured side and place the foot flat on the floor. Tighten the thigh muscle on your injured side and lift your leg about 8 inches off the floor. Keep your leg straight and your thigh muscle tight. Slowly lower your leg back down to the floor. Do 2 sets of 15.    Clam exercise: Lie on your uninjured side with your hips and knees bent and feet together. Slowly raise your top leg toward the ceiling while keeping your heels touching each other. Hold for 2 seconds and lower slowly. Do 2 sets of 15  repetitions.    Wall squat with a ball: Stand with your back, shoulders, and head against a wall. Look straight ahead. Keep your shoulders relaxed and your feet 3 feet (90 centimeters) from the wall and shoulder's width apart. Place a soccer or basketball-sized ball behind your back. Keeping your back against the wall, slowly squat down to a 45-degree angle. Your thighs will not yet be parallel to the floor. Hold this position for 10 seconds and then slowly slide back up the wall. Repeat 10 times. Build up to 2 sets of 15.    Knee stabilization: Wrap a piece of elastic tubing around the ankle of your uninjured leg. Tie a knot in the other end of the tubing and close it in a door at about ankle height.  Stand facing the door on the leg without tubing (your injured leg) and bend your knee slightly, keeping your thigh muscles tight. Stay in this position while you move the leg with the tubing (the uninjured leg) straight back behind you. Do 2 sets of 15.  Turn 90 degrees so the leg without tubing is closest to the door. Move the leg with tubing away from your body. Do 2 sets of 15.  Turn 90 degrees again so your back is to the door. Move the leg with tubing straight out in front of you. Do 2 sets of 15.  Turn your body 90 degrees again so the leg with tubing is closest to the door. Move the leg with tubing across your body. Do 2 sets of 15.  Hold onto a chair if you need help balancing. This exercise can be made more challenging by standing on a firm pillow or foam mat while you move the leg with tubing.    Resisted terminal knee extension: Make a loop with a piece of elastic tubing by tying a knot in both ends. Close the knot in a door at knee height. Step into the loop with your injured leg so the tubing is around the back of your knee. Lift the other foot off the ground and hold onto a chair for balance, if needed. Bend the knee with tubing about 45 degrees. Slowly straighten your leg, keeping your thigh muscle  tight as you do this. Repeat 15 times. Do 2 sets of 15. If you need an easier way to do this, stand on both legs for better support while you do the exercise.    Standing calf stretch: Stand facing a wall with your hands on the wall at about eye level. Keep your injured leg back with your heel on the floor. Keep the other leg forward with the knee bent. Turn your back foot slightly inward (as if you were pigeon-toed). Slowly lean into the wall until you feel a stretch in the back of your calf. Hold the stretch for 15 to 30 seconds. Return to the starting position. Repeat 3 times. Do this exercise several times each day.    Step-up: Stand with the foot of your injured leg on a support 3 to 5 inches (8 to 13 centimeters) high --like a small step or block of wood. Keep your other foot flat on the floor. Shift your weight onto the injured leg on the support. Straighten your injured leg as the other leg comes off the floor. Return to the starting position by bending your injured leg and slowly lowering your uninjured leg back to the floor. Do 2 sets of 15.    Iliotibial band stretch, side-bending: Cross one leg in front of the other leg and lean in the opposite direction from the front leg. Reach the arm on the side of the back leg over your head while you do this. Hold this position for 15 to 30 seconds. Return to the starting position. Repeat 3 times and then switch legs and repeat the exercise.  Developed by [employer].  Published by [employer].  Copyright  2014 [employer] and/or one of its subsidiaries. All rights reserved.

## 2025-01-10 ENCOUNTER — OFFICE VISIT (OUTPATIENT)
Dept: URGENT CARE | Facility: URGENT CARE | Age: 65
End: 2025-01-10
Payer: COMMERCIAL

## 2025-01-10 ENCOUNTER — HOSPITAL ENCOUNTER (OUTPATIENT)
Dept: CT IMAGING | Facility: CLINIC | Age: 65
Discharge: HOME | End: 2025-01-10
Attending: PREVENTIVE MEDICINE | Admitting: PREVENTIVE MEDICINE
Payer: COMMERCIAL

## 2025-01-10 VITALS
WEIGHT: 195.8 LBS | HEART RATE: 70 BPM | OXYGEN SATURATION: 96 % | BODY MASS INDEX: 33.61 KG/M2 | SYSTOLIC BLOOD PRESSURE: 134 MMHG | RESPIRATION RATE: 17 BRPM | TEMPERATURE: 96.8 F | DIASTOLIC BLOOD PRESSURE: 72 MMHG

## 2025-01-10 DIAGNOSIS — N28.9 KIDNEY LESION, NATIVE, LEFT: ICD-10-CM

## 2025-01-10 DIAGNOSIS — R31.29 MICROSCOPIC HEMATURIA: ICD-10-CM

## 2025-01-10 DIAGNOSIS — K76.9 HEPATIC LESION: ICD-10-CM

## 2025-01-10 DIAGNOSIS — R10.9 LEFT FLANK PAIN: ICD-10-CM

## 2025-01-10 DIAGNOSIS — R10.9 LEFT FLANK PAIN: Primary | ICD-10-CM

## 2025-01-10 LAB
ALBUMIN SERPL-MCNC: 4.2 G/DL (ref 3.4–5)
ALBUMIN UR-MCNC: 30 MG/DL
ALP SERPL-CCNC: 57 U/L (ref 40–150)
ALT SERPL W P-5'-P-CCNC: 24 U/L (ref 0–50)
ANION GAP SERPL CALCULATED.3IONS-SCNC: 9 MMOL/L (ref 3–14)
APPEARANCE UR: CLEAR
AST SERPL W P-5'-P-CCNC: 26 U/L (ref 0–45)
BACTERIA #/AREA URNS HPF: ABNORMAL /HPF
BASOPHILS # BLD AUTO: 0 10E3/UL (ref 0–0.2)
BASOPHILS NFR BLD AUTO: 1 %
BILIRUB SERPL-MCNC: 0.6 MG/DL (ref 0.2–1.3)
BILIRUB UR QL STRIP: NEGATIVE
BUN SERPL-MCNC: 10 MG/DL (ref 7–30)
CALCIUM SERPL-MCNC: 10.3 MG/DL (ref 8.5–10.1)
CHLORIDE BLD-SCNC: 102 MMOL/L (ref 94–109)
CO2 SERPL-SCNC: 28 MMOL/L (ref 20–32)
COLOR UR AUTO: YELLOW
CREAT SERPL-MCNC: 0.6 MG/DL (ref 0.52–1.04)
EGFRCR SERPLBLD CKD-EPI 2021: >90 ML/MIN/1.73M2
EOSINOPHIL # BLD AUTO: 0.1 10E3/UL (ref 0–0.7)
EOSINOPHIL NFR BLD AUTO: 2 %
ERYTHROCYTE [DISTWIDTH] IN BLOOD BY AUTOMATED COUNT: 12.7 % (ref 10–15)
GLUCOSE BLD-MCNC: 91 MG/DL (ref 70–99)
GLUCOSE UR STRIP-MCNC: NEGATIVE MG/DL
HCT VFR BLD AUTO: 44.4 % (ref 35–47)
HGB BLD-MCNC: 14.5 G/DL (ref 11.7–15.7)
HGB UR QL STRIP: ABNORMAL
IMM GRANULOCYTES # BLD: 0 10E3/UL
IMM GRANULOCYTES NFR BLD: 0 %
KETONES UR STRIP-MCNC: NEGATIVE MG/DL
LEUKOCYTE ESTERASE UR QL STRIP: ABNORMAL
LIPASE SERPL-CCNC: 56 U/L (ref 13–60)
LYMPHOCYTES # BLD AUTO: 1.7 10E3/UL (ref 0.8–5.3)
LYMPHOCYTES NFR BLD AUTO: 32 %
MCH RBC QN AUTO: 31.5 PG (ref 26.5–33)
MCHC RBC AUTO-ENTMCNC: 32.7 G/DL (ref 31.5–36.5)
MCV RBC AUTO: 96 FL (ref 78–100)
MONOCYTES # BLD AUTO: 0.4 10E3/UL (ref 0–1.3)
MONOCYTES NFR BLD AUTO: 9 %
NEUTROPHILS # BLD AUTO: 2.9 10E3/UL (ref 1.6–8.3)
NEUTROPHILS NFR BLD AUTO: 57 %
NITRATE UR QL: NEGATIVE
PH UR STRIP: 6 [PH] (ref 5–7)
PLATELET # BLD AUTO: 227 10E3/UL (ref 150–450)
POTASSIUM BLD-SCNC: 3.4 MMOL/L (ref 3.4–5.3)
PROT SERPL-MCNC: 7.7 G/DL (ref 6.8–8.8)
RBC # BLD AUTO: 4.61 10E6/UL (ref 3.8–5.2)
RBC #/AREA URNS AUTO: ABNORMAL /HPF
SODIUM SERPL-SCNC: 139 MMOL/L (ref 135–145)
SP GR UR STRIP: 1.02 (ref 1–1.03)
SQUAMOUS #/AREA URNS AUTO: ABNORMAL /LPF
UROBILINOGEN UR STRIP-ACNC: 0.2 E.U./DL
WBC # BLD AUTO: 5.1 10E3/UL (ref 4–11)
WBC #/AREA URNS AUTO: ABNORMAL /HPF

## 2025-01-10 PROCEDURE — 99215 OFFICE O/P EST HI 40 MIN: CPT | Performed by: PREVENTIVE MEDICINE

## 2025-01-10 PROCEDURE — 36415 COLL VENOUS BLD VENIPUNCTURE: CPT | Performed by: PREVENTIVE MEDICINE

## 2025-01-10 PROCEDURE — 74176 CT ABD & PELVIS W/O CONTRAST: CPT

## 2025-01-10 PROCEDURE — 85025 COMPLETE CBC W/AUTO DIFF WBC: CPT | Performed by: PREVENTIVE MEDICINE

## 2025-01-10 PROCEDURE — 83690 ASSAY OF LIPASE: CPT | Performed by: PREVENTIVE MEDICINE

## 2025-01-10 PROCEDURE — 80053 COMPREHEN METABOLIC PANEL: CPT | Performed by: PREVENTIVE MEDICINE

## 2025-01-10 PROCEDURE — 81001 URINALYSIS AUTO W/SCOPE: CPT | Performed by: PREVENTIVE MEDICINE

## 2025-01-10 RX ORDER — TRAMADOL HYDROCHLORIDE 50 MG/1
50 TABLET ORAL EVERY 6 HOURS PRN
Qty: 10 TABLET | Refills: 0 | Status: SHIPPED | OUTPATIENT
Start: 2025-01-10 | End: 2025-01-13

## 2025-01-10 NOTE — LETTER
1/10/2025    Cherry Kmi   1960        To Whom it May Concern;    Please excuse Cherry Kim from work until 2024 but she may return sooner without restrictions if able.    Sincerely,        Dada Javier MD

## 2025-01-10 NOTE — PROGRESS NOTES
Assessment & Plan   (R10.9) Left flank pain  (primary encounter diagnosis)  Plan: CT Abdomen Pelvis w/o Contrast, CBC with         platelets and differential, Comprehensive         metabolic panel, Lipase, traMADol (ULTRAM) 50         MG tablet    (R31.29) Microscopic hematuria  (N28.9) Kidney lesion, native, left  (K76.9) Hepatic lesion    CT and labs reassuring  Most consistent with musculoskeletal left sided back pain  Advise tylenol, heat, ice  Tramadol for breakthrough pain  Repeat UA in 2-3 weeks  Consider reimaging of renal lesion and liver lesion, discuss with pcp  Follow up with pcp in 2 weeks for recheck, sooner as needed     41 minutes spent by me on the date of the encounter doing chart review, history and exam, documentation and further activities per the note        No follow-ups on file.    Dada Javier MD  [facility]    Subjective     Cherry Kim is a 64 year old year old female who presents to clinic today for the following health issues:    Patient presents with:  Urgent Care  UTI: Left sided back pain and urinary frequency onset 2 days   Took Tylenol today around 10:15 AM  and heating pad - Hx of connective tissue disease and Microscopic colitis     This is a 63 yo female who presents with left sided mid back pain that is sharp and radiating to the left flank for the past 2 days.  Some urinary frequency.  No inciting event or trauma.  No n/v/c/d/blood in stool or urine.  No dysuria.  Back hurts whether or not she is changing positions.  No cp, sob.    Patient Active Problem List   Diagnosis    Hypothyroidism    Proteinuria    Essential hypertension, benign    Family history of malignant neoplasm of breast    Crustacean allergy    Obesity    Other specified disorder of rectum and anus    Hemorrhoids    Connective tissue disorder    Impaired fasting glucose    Hyperlipidemia LDL goal <130    Hallux abducto valgus    Somatic dysfunction of pelvis  region    Pain in the coccyx    Somatic dysfunction of lumbar region    Lumbago    Somatic dysfunction of sacral region    Thoracic segment dysfunction    Lymphocytic colitis       Current Outpatient Medications   Medication Sig Dispense Refill    EPINEPHrine (EPIPEN/ADRENACLICK/OR ANY BX GENERIC EQUIV) 0.3 MG/0.3ML injection 2-pack Inject 0.3 mLs (0.3 mg) into the muscle as needed for anaphylaxis 0.6 mL 11    estradiol (ESTRACE) 0.1 MG/GM vaginal cream Place 2 g vaginally three times a week 42.5 g 3    HYDROXYCHLOROQUINE SULFATE 200 MG PO TABS 1 TABLET DAILY      Iodoquinol-HC (HYDROCORTISONE-IODOQUINOL) 1-1 % CREA Externally apply  topically daily as needed.      levothyroxine (SYNTHROID/LEVOTHROID) 150 MCG tablet Take 1 tablet (150 mcg) by mouth daily 90 tablet 3    metoprolol tartrate (LOPRESSOR) 50 MG tablet Take 1 tablet (50 mg) by mouth 2 times daily 180 tablet 3    spironolactone-HCTZ (ALDACTAZIDE) 25-25 MG tablet Take 1 tablet by mouth daily 90 tablet 3    traMADol (ULTRAM) 50 MG tablet Take 1 tablet (50 mg) by mouth every 6 hours as needed for severe pain. 10 tablet 0    Vaginal Lubricant (REPLENS) GEL Place vaginally as needed      valACYclovir (VALTREX) 500 MG tablet Take 1 tablet (500 mg) by mouth daily 90 tablet 3    cholestyramine (QUESTRAN) 4 GM/DOSE powder TAKE 1 SCOOP BY MOUTH 2 TIMES EVERY DAY DISSOLVED IN 2-6 OUNCES OF WATER OR NONCARBONATED BEVERAGE       No current facility-administered medications for this visit.       Past Medical History:   Diagnosis Date    Anal fistula     Arthritis 12/1/10    Connective tissue disease    ASCUS on Pap smear     1980s- none since    Connective tissue disorder     undifferentiated    Essential hypertension, benign     Genital herpes 2017    Impaired fasting glucose     Obesity, unspecified     Pelvic floor dysfunction     Dede-rectal abscess     Rectal abscess at age 38, occasional rectal bleeding since then    Proteinuria     0.400 g/day negative w/u     Shellfish Allergy     Unspecified hemorrhoids without mention of complication     Unspecified hypothyroidism        Social History   reports that she has never smoked. She has never used smokeless tobacco. She reports current alcohol use. She reports that she does not use drugs.    Family History   Problem Relation Age of Onset    Breast Cancer Mother         At 55 yrs    Hypertension Mother     Diabetes Mother         Mid forties    C.A.D. Mother          of CHF,  at 58yrs of ? MI    Connective Tissue Disorder Mother         Lupus    Respiratory Father         d. 79 from complications of pneumonia    Cancer Father         Lung Cancer    Other Cancer Father     Thyroid Disease Father     Mitral valve prolapse Sister         possible    No Known Problems Brother     Cancer Brother         brain cancer      LUNG DISEASE Brother         COPD- lung transplant    No Known Problems Maternal Grandmother     Cancer Maternal Grandfather         Stomach cancer in his 40s    No Known Problems Paternal Grandmother     Cancer Maternal Aunt         vaginal    Cancer Maternal Aunt         cervical    Cancer Maternal Aunt         Rectal cancer    No Known Problems Other        Review of Systems  Constitutional, HEENT, cardiovascular, pulmonary, GI, , musculoskeletal, neuro, skin, endocrine and psych systems are negative, except as otherwise noted.      Objective    /72 (BP Location: Left arm, Patient Position: Sitting, Cuff Size: Adult Large)   Pulse 70   Temp 96.8  F (36  C) (Oral)   Resp 17   Wt 88.8 kg (195 lb 12.8 oz)   LMP 2007   SpO2 96%   Breastfeeding No   BMI 33.61 kg/m    Physical Exam   GENERAL: alert and no distress  EYES: Eyes grossly normal to inspection, PERRL and conjunctivae and sclerae normal  HENT: ear canals and TM's normal, nose and mouth without ulcers or lesions  NECK: no adenopathy, no asymmetry, masses, or scars  RESP: lungs clear to auscultation - no rales, rhonchi or  wheezes  CV: regular rate and rhythm, normal S1 S2, no S3 or S4, no murmur, click or rub, no peripheral edema  ABDOMEN: soft, nontender, no hepatosplenomegaly, no masses and bowel sounds normal  MS: no gross musculoskeletal defects noted, no edema  SKIN: no suspicious lesions or rashes  NEURO: Normal strength and tone, mentation intact and speech normal  PSYCH: mentation appears normal, affect normal/bright  Back -  mild ttp left mid back paraspinal region, no rash, neg slr brittney, nl int/ext rot hips, nl strength, senation, reflexes brittney LE, nl rom back, nl gain, no si jt ttp, no pelvic tilt    Results for orders placed or performed during the hospital encounter of 01/10/25   CT Abdomen Pelvis w/o Contrast     Status: None    Narrative    EXAM: CT ABDOMEN PELVIS W/O CONTRAST  LOCATION: [facility]  DATE: 1/10/2025    INDICATION: L flank pain  COMPARISON: None.  TECHNIQUE: CT scan of the abdomen and pelvis was performed without IV contrast. Multiplanar reformats were obtained. Dose reduction techniques were used.  CONTRAST: None.    FINDINGS:   LOWER CHEST: Coronary artery disease.    HEPATOBILIARY: 12 mm x 9 mm low-attenuation right hepatic lesion (series 3 image 53).    PANCREAS: Normal.    SPLEEN: Normal.    ADRENAL GLANDS: A benign left adrenal adenoma does not require follow-up.    KIDNEYS/BLADDER: A 25 mm left renal lesion is statistically likely to represent a simple cyst.    BOWEL: Normal stomach. Normal caliber of the small bowel. Normal appendix. There is advanced colonic diverticulosis. Cannot assess for a perianal fistula on this study.    LYMPH NODES: Normal.    VASCULATURE: Normal.    PELVIC ORGANS: Calcified uterine fibroids.    MUSCULOSKELETAL: Normal.      Impression    IMPRESSION:   1.  No ureteral stones. No hydronephrosis.  2.  Advanced colonic diverticulosis. No diverticulitis.  3.  Low-attenuation right hepatic lesion is likely benign in a patient with an otherwise normal  liver and in the absence of history of malignancy. No follow-up required.  4.  25 mm left renal lesion is statistically likely to represent a simple cyst. This could be confirmed with renal mass protocol MRI or CT. Ultrasound may be helpful.     Results for orders placed or performed in visit on 01/10/25   UA Macroscopic with reflex to Microscopic and Culture - Clinic Collect     Status: Abnormal    Specimen: Urine, Clean Catch   Result Value Ref Range    Color Urine Yellow Colorless, Straw, Light Yellow, Yellow    Appearance Urine Clear Clear    Glucose Urine Negative Negative mg/dL    Bilirubin Urine Negative Negative    Ketones Urine Negative Negative mg/dL    Specific Gravity Urine 1.020 1.003 - 1.035    Blood Urine Small (A) Negative    pH Urine 6.0 5.0 - 7.0    Protein Albumin Urine 30 (A) Negative mg/dL    Urobilinogen Urine 0.2 0.2, 1.0 E.U./dL    Nitrite Urine Negative Negative    Leukocyte Esterase Urine Trace (A) Negative   UA Microscopic with Reflex to Culture     Status: Abnormal   Result Value Ref Range    Bacteria Urine Few (A) None Seen /HPF    RBC Urine 2-5 (A) 0-2 /HPF /HPF    WBC Urine 0-5 0-5 /HPF /HPF    Squamous Epithelials Urine Few (A) None Seen /LPF    Narrative    Urine Culture not indicated   Comprehensive metabolic panel     Status: Abnormal   Result Value Ref Range    Sodium 139 135 - 145 mmol/L    Potassium 3.4 3.4 - 5.3 mmol/L    Chloride 102 94 - 109 mmol/L    Carbon Dioxide (CO2) 28 20 - 32 mmol/L    Anion Gap 9 3 - 14 mmol/L    Urea Nitrogen 10 7 - 30 mg/dL    Creatinine 0.60 0.52 - 1.04 mg/dL    GFR Estimate >90 >60 mL/min/1.73m2    Calcium 10.3 (H) 8.5 - 10.1 mg/dL    Glucose 91 70 - 99 mg/dL    Alkaline Phosphatase 57 40 - 150 U/L    AST 26 0 - 45 U/L    ALT 24 0 - 50 U/L    Protein Total 7.7 6.8 - 8.8 g/dL    Albumin 4.2 3.4 - 5.0 g/dL    Bilirubin Total 0.6 0.2 - 1.3 mg/dL   Lipase     Status: Normal   Result Value Ref Range    Lipase 56 13 - 60 U/L   CBC with platelets and  differential     Status: None   Result Value Ref Range    WBC Count 5.1 4.0 - 11.0 10e3/uL    RBC Count 4.61 3.80 - 5.20 10e6/uL    Hemoglobin 14.5 11.7 - 15.7 g/dL    Hematocrit 44.4 35.0 - 47.0 %    MCV 96 78 - 100 fL    MCH 31.5 26.5 - 33.0 pg    MCHC 32.7 31.5 - 36.5 g/dL    RDW 12.7 10.0 - 15.0 %    Platelet Count 227 150 - 450 10e3/uL    % Neutrophils 57 %    % Lymphocytes 32 %    % Monocytes 9 %    % Eosinophils 2 %    % Basophils 1 %    % Immature Granulocytes 0 %    Absolute Neutrophils 2.9 1.6 - 8.3 10e3/uL    Absolute Lymphocytes 1.7 0.8 - 5.3 10e3/uL    Absolute Monocytes 0.4 0.0 - 1.3 10e3/uL    Absolute Eosinophils 0.1 0.0 - 0.7 10e3/uL    Absolute Basophils 0.0 0.0 - 0.2 10e3/uL    Absolute Immature Granulocytes 0.0 <=0.4 10e3/uL   CBC with platelets and differential     Status: None    Narrative    The following orders were created for panel order CBC with platelets and differential.  Procedure                               Abnormality         Status                     ---------                               -----------         ------                     CBC with platelets and d...[[id number]]                      Final result                 Please view results for these tests on the individual orders.

## 2025-02-09 DIAGNOSIS — B00.9 HSV INFECTION: ICD-10-CM

## 2025-02-10 RX ORDER — VALACYCLOVIR HYDROCHLORIDE 500 MG/1
TABLET, FILM COATED ORAL
Qty: 90 TABLET | Refills: 0 | Status: SHIPPED | OUTPATIENT
Start: 2025-02-10

## 2025-02-11 ENCOUNTER — OFFICE VISIT (OUTPATIENT)
Dept: INTERNAL MEDICINE | Facility: CLINIC | Age: 65
End: 2025-02-11
Payer: COMMERCIAL

## 2025-02-11 VITALS
OXYGEN SATURATION: 97 % | HEIGHT: 64 IN | SYSTOLIC BLOOD PRESSURE: 149 MMHG | BODY MASS INDEX: 33.63 KG/M2 | HEART RATE: 67 BPM | WEIGHT: 197 LBS | RESPIRATION RATE: 16 BRPM | DIASTOLIC BLOOD PRESSURE: 81 MMHG | TEMPERATURE: 98.3 F

## 2025-02-11 DIAGNOSIS — S20.219A CONTUSION OF CHEST WALL, UNSPECIFIED LATERALITY, INITIAL ENCOUNTER: ICD-10-CM
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
## 2025-02-11 PROCEDURE — 99213 OFFICE O/P EST LOW 20 MIN: CPT | Performed by: INTERNAL MEDICINE

## 2025-02-11 RX ORDER — CELECOXIB 200 MG/1
CAPSULE ORAL
Qty: 8 CAPSULE | Refills: 0 | Status: SHIPPED | OUTPATIENT
Start: 2025-02-11 | End: 2025-02-18

## 2025-02-11 RX ORDER — COLESTIPOL HYDROCHLORIDE 1 G/1
1 TABLET ORAL DAILY
COMMUNITY
Start: 2024-05-27
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
## 2025-02-19 ENCOUNTER — TRANSFERRED RECORDS (OUTPATIENT)
Dept: HEALTH INFORMATION MANAGEMENT | Facility: CLINIC | Age: 65
End: 2025-02-19
Payer: COMMERCIAL

## 2025-03-06 ENCOUNTER — PATIENT OUTREACH (OUTPATIENT)
Dept: CARE COORDINATION | Facility: CLINIC | Age: 65
End: 2025-03-06
Payer: COMMERCIAL

## 2025-03-20 ENCOUNTER — PATIENT OUTREACH (OUTPATIENT)
Dept: CARE COORDINATION | Facility: CLINIC | Age: 65
End: 2025-03-20
Payer: COMMERCIAL

## 2025-04-06 DIAGNOSIS — I10 ESSENTIAL HYPERTENSION, BENIGN: ICD-10-CM

## 2025-04-08 RX ORDER — SPIRONOLACTONE AND HYDROCHLOROTHIAZIDE 25; 25 MG/1; MG/1
1 TABLET ORAL DAILY
Qty: 90 TABLET | Refills: 0 | Status: SHIPPED | OUTPATIENT
Start: 2025-04-08

## 2025-04-16 ENCOUNTER — PATIENT OUTREACH (OUTPATIENT)
Dept: CARE COORDINATION | Facility: CLINIC | Age: 65
End: 2025-04-16
Payer: COMMERCIAL

## 2025-04-17 DIAGNOSIS — I10 ESSENTIAL HYPERTENSION, BENIGN: ICD-10-CM

## 2025-04-17 RX ORDER — METOPROLOL TARTRATE 50 MG
50 TABLET ORAL 2 TIMES DAILY
Qty: 180 TABLET | Refills: 0 | Status: SHIPPED | OUTPATIENT
Start: 2025-04-17

## 2025-04-22 ENCOUNTER — OFFICE VISIT (OUTPATIENT)
Dept: INTERNAL MEDICINE | Facility: CLINIC | Age: 65
End: 2025-04-22
Payer: COMMERCIAL

## 2025-04-22 VITALS
HEART RATE: 60 BPM | DIASTOLIC BLOOD PRESSURE: 78 MMHG | WEIGHT: 196.7 LBS | RESPIRATION RATE: 16 BRPM | SYSTOLIC BLOOD PRESSURE: 138 MMHG | OXYGEN SATURATION: 98 % | TEMPERATURE: 97.7 F | HEIGHT: 64 IN | BODY MASS INDEX: 33.58 KG/M2

## 2025-04-22 DIAGNOSIS — E03.9 ACQUIRED HYPOTHYROIDISM: ICD-10-CM

## 2025-04-22 DIAGNOSIS — R80.9 MICROALBUMINURIA: ICD-10-CM

## 2025-04-22 DIAGNOSIS — B00.9 HSV INFECTION: ICD-10-CM

## 2025-04-22 DIAGNOSIS — I10 ESSENTIAL HYPERTENSION, BENIGN: ICD-10-CM

## 2025-04-22 DIAGNOSIS — E78.2 MIXED HYPERLIPIDEMIA: ICD-10-CM

## 2025-04-22 DIAGNOSIS — E03.4 HYPOTHYROIDISM DUE TO ACQUIRED ATROPHY OF THYROID: ICD-10-CM

## 2025-04-22 DIAGNOSIS — N28.9 RENAL LESION: ICD-10-CM

## 2025-04-22 DIAGNOSIS — Z00.00 ROUTINE GENERAL MEDICAL EXAMINATION AT A HEALTH CARE FACILITY: Primary | ICD-10-CM

## 2025-04-22 LAB
ANION GAP SERPL CALCULATED.3IONS-SCNC: 14 MMOL/L (ref 7–15)
BUN SERPL-MCNC: 10.9 MG/DL (ref 8–23)
CALCIUM SERPL-MCNC: 10.2 MG/DL (ref 8.8–10.4)
CHLORIDE SERPL-SCNC: 98 MMOL/L (ref 98–107)
CHOLEST SERPL-MCNC: 221 MG/DL
CREAT SERPL-MCNC: 0.76 MG/DL (ref 0.51–0.95)
CREAT UR-MCNC: 27 MG/DL
EGFRCR SERPLBLD CKD-EPI 2021: 88 ML/MIN/1.73M2
FASTING STATUS PATIENT QL REPORTED: YES
FASTING STATUS PATIENT QL REPORTED: YES
GLUCOSE SERPL-MCNC: 101 MG/DL (ref 70–99)
HCO3 SERPL-SCNC: 26 MMOL/L (ref 22–29)
HDLC SERPL-MCNC: 54 MG/DL
LDLC SERPL CALC-MCNC: 142 MG/DL
MICROALBUMIN UR-MCNC: 22.6 MG/L
MICROALBUMIN/CREAT UR: 83.7 MG/G CR (ref 0–25)
NONHDLC SERPL-MCNC: 167 MG/DL
POTASSIUM SERPL-SCNC: 4.2 MMOL/L (ref 3.4–5.3)
SODIUM SERPL-SCNC: 138 MMOL/L (ref 135–145)
TRIGL SERPL-MCNC: 123 MG/DL
TSH SERPL DL<=0.005 MIU/L-ACNC: 2.1 UIU/ML (ref 0.3–4.2)

## 2025-04-22 PROCEDURE — 84443 ASSAY THYROID STIM HORMONE: CPT | Performed by: INTERNAL MEDICINE

## 2025-04-22 PROCEDURE — 36415 COLL VENOUS BLD VENIPUNCTURE: CPT | Performed by: INTERNAL MEDICINE

## 2025-04-22 PROCEDURE — 82570 ASSAY OF URINE CREATININE: CPT | Performed by: INTERNAL MEDICINE

## 2025-04-22 PROCEDURE — 99214 OFFICE O/P EST MOD 30 MIN: CPT | Mod: 25 | Performed by: INTERNAL MEDICINE

## 2025-04-22 PROCEDURE — 80048 BASIC METABOLIC PNL TOTAL CA: CPT | Performed by: INTERNAL MEDICINE

## 2025-04-22 PROCEDURE — 3078F DIAST BP <80 MM HG: CPT | Performed by: INTERNAL MEDICINE

## 2025-04-22 PROCEDURE — 82043 UR ALBUMIN QUANTITATIVE: CPT | Performed by: INTERNAL MEDICINE

## 2025-04-22 PROCEDURE — 80061 LIPID PANEL: CPT | Performed by: INTERNAL MEDICINE

## 2025-04-22 PROCEDURE — 3075F SYST BP GE 130 - 139MM HG: CPT | Performed by: INTERNAL MEDICINE

## 2025-04-22 PROCEDURE — 99396 PREV VISIT EST AGE 40-64: CPT | Performed by: INTERNAL MEDICINE

## 2025-04-22 PROCEDURE — G2211 COMPLEX E/M VISIT ADD ON: HCPCS | Performed by: INTERNAL MEDICINE

## 2025-04-22 RX ORDER — LEVOTHYROXINE SODIUM 150 UG/1
150 TABLET ORAL DAILY
Qty: 90 TABLET | Refills: 3 | Status: SHIPPED | OUTPATIENT
Start: 2025-04-22

## 2025-04-22 RX ORDER — AMLODIPINE BESYLATE 5 MG/1
5 TABLET ORAL DAILY
Qty: 90 TABLET | Refills: 3 | Status: SHIPPED | OUTPATIENT
Start: 2025-04-22

## 2025-04-22 RX ORDER — VALACYCLOVIR HYDROCHLORIDE 500 MG/1
500 TABLET, FILM COATED ORAL DAILY
Qty: 90 TABLET | Refills: 3 | Status: SHIPPED | OUTPATIENT
Start: 2025-04-22

## 2025-04-22 RX ORDER — TACROLIMUS 1 MG/G
OINTMENT TOPICAL PRN
COMMUNITY
Start: 2025-04-01

## 2025-04-22 RX ORDER — SPIRONOLACTONE AND HYDROCHLOROTHIAZIDE 25; 25 MG/1; MG/1
1 TABLET ORAL DAILY
Qty: 90 TABLET | Refills: 3 | Status: SHIPPED | OUTPATIENT
Start: 2025-04-22

## 2025-04-22 RX ORDER — ROSUVASTATIN CALCIUM 10 MG/1
10 TABLET, COATED ORAL AT BEDTIME
Qty: 90 TABLET | Refills: 3 | Status: SHIPPED | OUTPATIENT
Start: 2025-04-22

## 2025-04-22 RX ORDER — METOPROLOL TARTRATE 50 MG
50 TABLET ORAL 2 TIMES DAILY
Qty: 180 TABLET | Refills: 3 | Status: SHIPPED | OUTPATIENT
Start: 2025-04-22

## 2025-04-22 SDOH — HEALTH STABILITY: PHYSICAL HEALTH: ON AVERAGE, HOW MANY DAYS PER WEEK DO YOU ENGAGE IN MODERATE TO STRENUOUS EXERCISE (LIKE A BRISK WALK)?: 3 DAYS

## 2025-04-22 ASSESSMENT — SOCIAL DETERMINANTS OF HEALTH (SDOH): HOW OFTEN DO YOU GET TOGETHER WITH FRIENDS OR RELATIVES?: TWICE A WEEK

## 2025-04-22 NOTE — PATIENT INSTRUCTIONS
Patient Education   Preventive Care Advice   This is general advice given by our system to help you stay healthy. However, your care team may have specific advice just for you. Please talk to your care team about your preventive care needs.  Nutrition  Eat 5 or more servings of fruits and vegetables each day.  Try wheat bread, brown rice and whole grain pasta (instead of white bread, rice, and pasta).  Get enough calcium and vitamin D. Check the label on foods and aim for 100% of the RDA (recommended daily allowance).  Lifestyle  Exercise at least 150 minutes each week  (30 minutes a day, 5 days a week).  Do muscle strengthening activities 2 days a week. These help control your weight and prevent disease.  No smoking.  Wear sunscreen to prevent skin cancer.  Have a dental exam and cleaning every 6 months.  Yearly exams  See your health care team every year to talk about:  Any changes in your health.  Any medicines your care team has prescribed.  Preventive care, family planning, and ways to prevent chronic diseases.  Shots (vaccines)   HPV shots (up to age 26), if you've never had them before.  Hepatitis B shots (up to age 59), if you've never had them before.  COVID-19 shot: Get this shot when it's due.  Flu shot: Get a flu shot every year.  Tetanus shot: Get a tetanus shot every 10 years.  Pneumococcal, hepatitis A, and RSV shots: Ask your care team if you need these based on your risk.  Shingles shot (for age 50 and up)  General health tests  Diabetes screening:  Starting at age 35, Get screened for diabetes at least every 3 years.  If you are younger than age 35, ask your care team if you should be screened for diabetes.  Cholesterol test: At age 39, start having a cholesterol test every 5 years, or more often if advised.  Bone density scan (DEXA): At age 50, ask your care team if you should have this scan for osteoporosis (brittle bones).  Hepatitis C: Get tested at least once in your life.  STIs (sexually  transmitted infections)  Before age 24: Ask your care team if you should be screened for STIs.  After age 24: Get screened for STIs if you're at risk. You are at risk for STIs (including HIV) if:  You are sexually active with more than one person.  You don't use condoms every time.  You or a partner was diagnosed with a sexually transmitted infection.  If you are at risk for HIV, ask about PrEP medicine to prevent HIV.  Get tested for HIV at least once in your life, whether you are at risk for HIV or not.  Cancer screening tests  Cervical cancer screening: If you have a cervix, begin getting regular cervical cancer screening tests starting at age 21.  Breast cancer scan (mammogram): If you've ever had breasts, begin having regular mammograms starting at age 40. This is a scan to check for breast cancer.  Colon cancer screening: It is important to start screening for colon cancer at age 45.  Have a colonoscopy test every 10 years (or more often if you're at risk) Or, ask your provider about stool tests like a FIT test every year or Cologuard test every 3 years.  To learn more about your testing options, visit:   .  For help making a decision, visit:   https://bit.ly/gl91926.  Prostate cancer screening test: If you have a prostate, ask your care team if a prostate cancer screening test (PSA) at age 55 is right for you.  Lung cancer screening: If you are a current or former smoker ages 50 to 80, ask your care team if ongoing lung cancer screenings are right for you.  For informational purposes only. Not to replace the advice of your health care provider. Copyright   2023 [employer]. All rights reserved. Clinically reviewed by the [facility] Transitions Program. [employer] the [employer] 128888 - REV 01/24.

## 2025-04-22 NOTE — PROGRESS NOTES
"Preventive Care Visit  [facility]  Krish Shannon MD, Internal Medicine  Apr 22, 2025      Assessment & Plan     Routine general medical examination at a health care facility  Updated screening, immunizations, prevention.  Please see health maintenance list, care gaps     Essential hypertension, benign  Add amlodipine  Could look at adding ARB if significant microalb noted- does have hx angioedema with ACEi but this shouldn't contraindicate using an ARB  - Basic metabolic panel  (Ca, Cl, CO2, Creat, Gluc, K, Na, BUN); Future  - amLODIPine (NORVASC) 5 MG tablet; Take 1 tablet (5 mg) by mouth daily.  - Albumin Random Urine Quantitative with Creat Ratio; Future  - Basic metabolic panel  (Ca, Cl, CO2, Creat, Gluc, K, Na, BUN)  - Albumin Random Urine Quantitative with Creat Ratio    Mixed hyperlipidemia  Try adding statin   - Lipid panel reflex to direct LDL Non-fasting; Future  - rosuvastatin (CRESTOR) 10 MG tablet; Take 1 tablet (10 mg) by mouth at bedtime.  - Lipid panel reflex to direct LDL Non-fasting    Acquired hypothyroidism  - TSH WITH FREE T4 REFLEX; Future  - TSH WITH FREE T4 REFLEX    Renal lesion  CT noted a 25 mm lesion most consistent with cyst but rad did rec'd further confirmation by imaging  - US Renal Complete Non-Vascular; Future    Patient has been advised of split billing requirements and indicates understanding: Yes    BMI  Estimated body mass index is 33.76 kg/m  as calculated from the following:    Height as of this encounter: 1.626 m (5' 4\").    Weight as of this encounter: 89.2 kg (196 lb 11.2 oz).   Weight management plan: Discussed healthy diet and exercise guidelines    Counseling  Appropriate preventive services were addressed with this patient via screening, questionnaire, or discussion as appropriate for fall prevention, nutrition, physical activity, Tobacco-use cessation, social engagement, weight loss and cognition.  Checklist reviewing preventive services " available has been given to the patient.  Reviewed patient's diet, addressing concerns and/or questions.   She is at risk for lack of exercise and has been provided with information to increase physical activity for the benefit of her well-being.       Follow-up    Follow-up Visit   Expected date:  Apr 22, 2026 (Approximate)      Follow Up Appointment Details:     Follow-up with whom?: PCP    Follow-Up for what?: Adult Preventive    How?: In Person                 The longitudinal plan of care for the diagnosis(es)/condition(s) as documented were addressed during this visit. Due to the added complexity in care, I will continue to support Charu in the subsequent management and with ongoing continuity of care.    Subjective   Charu is a 64 year old, presenting for the following:  Physical           HPI   HTN- not checking home # routinely. Mores recent #  BP Readings from Last 3 Encounters:   04/22/25 138/78   02/11/25 (!) 149/81   01/10/25 134/72      Lipids- incidential findings on CT did show CAD. Not on statin currently. Was on previously - but had lymphocytic colitis ? Due to statin or nsaids that she was on at that time. Now on colestipol though still feels sx didn't change much when stopped those meds in past.         Advance Care Planning    Document on file is a Health Care Directive or POLST.        4/22/2025   General Health   How would you rate your overall physical health? Good   Feel stress (tense, anxious, or unable to sleep) Only a little   (!) STRESS CONCERN      4/22/2025   Nutrition   Three or more servings of calcium each day? Yes   Diet: Gluten-free/reduced    Other   If other, please elaborate: no dairy   How many servings of fruit and vegetables per day? (!) 2-3   How many sweetened beverages each day? 0-1       Multiple values from one day are sorted in reverse-chronological order         4/22/2025   Exercise   Days per week of moderate/strenous exercise 3 days         4/22/2025   Social Factors    Frequency of gathering with friends or relatives Twice a week   Worry food won't last until get money to buy more No   Food not last or not have enough money for food? No   Do you have housing? (Housing is defined as stable permanent housing and does not include staying ouside in a car, in a tent, in an abandoned building, in an overnight shelter, or couch-surfing.) Yes   Are you worried about losing your housing? No   Lack of transportation? No   Unable to get utilities (heat,electricity)? No         4/22/2025   Fall Risk   Fallen 2 or more times in the past year? No   Trouble with walking or balance? No          4/22/2025   Dental   Dentist two times every year? Yes           Today's PHQ-2 Score:       2/11/2025     2:50 PM   PHQ-2 ( 1999 [employer])   Q1: Little interest or pleasure in doing things 0   Q2: Feeling down, depressed or hopeless 0   PHQ-2 Score 0    Q1: Little interest or pleasure in doing things Not at all   Q2: Feeling down, depressed or hopeless Not at all   PHQ-2 Score 0       Patient-reported         4/22/2025   Substance Use   Alcohol more than 3/day or more than 7/wk No   Do you use any other substances recreationally? No     Social History     Tobacco Use    Smoking status: Never    Smokeless tobacco: Never   Vaping Use    Vaping status: Never Used   Substance Use Topics    Alcohol use: Yes     Comment: 1/ day    Drug use: No           5/16/2024   LAST FHS-7 RESULTS   1st degree relative breast or ovarian cancer Yes   Any relative bilateral breast cancer Unknown   Any male have breast cancer No   Any ONE woman have BOTH breast AND ovarian cancer No   Any woman with breast cancer before 50yrs No   2 or more relatives with breast AND/OR ovarian cancer Yes   2 or more relatives with breast AND/OR bowel cancer Yes                4/22/2025   STI Screening   New sexual partner(s) since last STI/HIV test? No     History of abnormal Pap smear: No - age 30- 64 PAP with HPV every 5 years  "recommended        Latest Ref Rng & Units 3/13/2023    11:31 AM 7/31/2018     4:22 PM 7/31/2018     4:15 PM   PAP / HPV   PAP  Negative for Intraepithelial Lesion or Malignancy (NILM)      PAP (Historical)   NIL     HPV 16 DNA Negative Negative   Negative    HPV 18 DNA Negative Negative   Negative    Other HR HPV Negative Negative   Negative      ASCVD Risk   The 10-year ASCVD risk score (Silvino MITCHELL, et al., 2019) is: 7.4%    Values used to calculate the score:      Age: 64 years      Sex: Female      Is Non- : No      Diabetic: No      Tobacco smoker: No      Systolic Blood Pressure: 138 mmHg      Is BP treated: Yes      HDL Cholesterol: 54 mg/dL      Total Cholesterol: 214 mg/dL           Reviewed and updated as needed this visit by Provider    Allergies                       Review of Systems  Constitutional, HEENT, cardiovascular, pulmonary, gi and gu systems are negative, except as otherwise noted.     Objective    Exam  /78   Pulse 60   Temp 97.7  F (36.5  C) (Temporal)   Resp 16   Ht 1.626 m (5' 4\")   Wt 89.2 kg (196 lb 11.2 oz)   LMP 06/27/2007   SpO2 98%   BMI 33.76 kg/m     Estimated body mass index is 33.76 kg/m  as calculated from the following:    Height as of this encounter: 1.626 m (5' 4\").    Weight as of this encounter: 89.2 kg (196 lb 11.2 oz).    Physical Exam  GENERAL: alert and no distress  EYES: Eyes grossly normal to inspection, PERRL and conjunctivae and sclerae normal  HENT: ear canals and TM's normal, nose and mouth without ulcers or lesions  NECK: no adenopathy, no asymmetry, masses, or scars  RESP: lungs clear to auscultation - no rales, rhonchi or wheezes  CV: regular rate and rhythm, normal S1 S2, no S3 or S4, no murmur, click or rub, no peripheral edema  ABDOMEN: soft, nontender, no hepatosplenomegaly, no masses and bowel sounds normal  MS: no gross musculoskeletal defects noted, no edema  SKIN: no suspicious lesions or rashes  NEURO: " Normal strength and tone, mentation intact and speech normal  PSYCH: mentation appears normal, affect normal/bright  LYMPH: no cervical adenopathy        Signed Electronically by: Krish Shannon MD

## 2025-04-28 ENCOUNTER — TRANSFERRED RECORDS (OUTPATIENT)
Dept: HEALTH INFORMATION MANAGEMENT | Facility: CLINIC | Age: 65
End: 2025-04-28
Payer: COMMERCIAL

## 2025-05-26 ENCOUNTER — MYC REFILL (OUTPATIENT)
Dept: OBGYN | Facility: CLINIC | Age: 65
End: 2025-05-26
Payer: COMMERCIAL

## 2025-05-26 DIAGNOSIS — N94.12 DEEP DYSPAREUNIA IN FEMALE: ICD-10-CM

## 2025-05-27 RX ORDER — ESTRADIOL 0.1 MG/G
2 CREAM VAGINAL
Qty: 42.5 G | Refills: 3 | OUTPATIENT
Start: 2025-05-28

## 2025-05-27 NOTE — TELEPHONE ENCOUNTER
Requested Prescriptions   Pending Prescriptions Disp Refills    estradiol (ESTRACE) 0.1 MG/GM vaginal cream 42.5 g 3     Sig: Place 2 g vaginally three times a week.       Hormone Replacement Therapy (vaginal) Failed - 5/27/2025 10:48 AM        Failed - Recent (12 month) or future (90 days) visit with authorizing provider's specialty (provided they have been seen in the past 15 months)     The patient must have completed an in-person or virtual visit within the past 12 months or has a future visit scheduled within the next 90 days with the authorizing provider s specialty.  Urgent care and e-visits do not qualify as an office visit for this protocol.          Failed - Medication indicated for associated diagnosis     Medication is associated with one or more of the following diagnoses:     Menopause   Vulva/Vaginal atrophy   UTI prophylaxis          Passed - Medication is active on med list and the sig matches. RN to manually verify dose and sig if red X/fail.     If the protocol passes (green check), you do not need to verify med dose and sig.    A prescription matches if they are the same clinical intention.    For Example: once daily and every morning are the same.    The protocol can not identify upper and lower case letters as matching and will fail.     For Example: Take 1 tablet (50 mg) by mouth daily     TAKE 1 TABLET (50 MG) BY MOUTH DAILY    For all fails (red x), verify dose and sig.    If the refill does match what is on file, the RN can still proceed to approve the refill request.       If they do not match, route to the appropriate provider.             Passed - Patient is 18 years of age or older        Passed - No active pregnancy on record        Passed - No positive pregnancy test on record in past 12 months           Last Written Prescription Date:  3/13/23  Last Fill Quantity: 42.5g,  # refills: 3   Last office visit: 3/13/23 last annual; last virtual visit: Visit date not found with prescribing  provider:  Luciano Sanford Office Visit:  none    Rx denied - pt has not been to clinic in 2 years. Needs appointment for refills    Aimee Currie RN on 5/27/2025 at 10:49 AM  [facility] Triage

## 2025-05-31 ENCOUNTER — MYC REFILL (OUTPATIENT)
Dept: INTERNAL MEDICINE | Facility: CLINIC | Age: 65
End: 2025-05-31
Payer: COMMERCIAL

## 2025-06-02 NOTE — TELEPHONE ENCOUNTER
Clinic RN: Please investigate patient's chart or contact patient if the information cannot be found because the medication is listed as historical or discontinued. Confirm patient is taking this medication. Document findings and route refill encounter to provider for approval or denial.       none

## 2025-06-02 NOTE — TELEPHONE ENCOUNTER
Clinic RN: Please investigate patient's chart or contact patient if the information cannot be found because the medication is listed as historical or discontinued. Confirm patient is taking this medication. Document findings and route refill encounter to provider for approval or denial.  Thanks,  Shannon CONTRERAS RN

## 2025-06-04 ENCOUNTER — TRANSFERRED RECORDS (OUTPATIENT)
Dept: HEALTH INFORMATION MANAGEMENT | Facility: CLINIC | Age: 65
End: 2025-06-04
Payer: COMMERCIAL

## 2025-06-13 ENCOUNTER — ANCILLARY PROCEDURE (OUTPATIENT)
Dept: MAMMOGRAPHY | Facility: CLINIC | Age: 65
End: 2025-06-13
Attending: INTERNAL MEDICINE
Payer: COMMERCIAL

## 2025-06-13 DIAGNOSIS — Z12.31 VISIT FOR SCREENING MAMMOGRAM: ICD-10-CM

## 2025-06-13 PROCEDURE — 77063 BREAST TOMOSYNTHESIS BI: CPT | Mod: TC | Performed by: RADIOLOGY

## 2025-06-13 PROCEDURE — 77067 SCR MAMMO BI INCL CAD: CPT | Mod: TC | Performed by: RADIOLOGY

## 2025-07-11 ENCOUNTER — ANCILLARY PROCEDURE (OUTPATIENT)
Dept: ULTRASOUND IMAGING | Facility: CLINIC | Age: 65
End: 2025-07-11
Attending: INTERNAL MEDICINE
Payer: COMMERCIAL

## 2025-07-11 DIAGNOSIS — N28.9 RENAL LESION: ICD-10-CM

## 2025-07-11 PROCEDURE — 76770 US EXAM ABDO BACK WALL COMP: CPT

## 2025-07-24 ENCOUNTER — OFFICE VISIT (OUTPATIENT)
Dept: INTERNAL MEDICINE | Facility: CLINIC | Age: 65
End: 2025-07-24
Payer: COMMERCIAL

## 2025-07-24 VITALS
DIASTOLIC BLOOD PRESSURE: 74 MMHG | SYSTOLIC BLOOD PRESSURE: 124 MMHG | WEIGHT: 193.3 LBS | TEMPERATURE: 97.6 F | HEIGHT: 64 IN | HEART RATE: 72 BPM | RESPIRATION RATE: 16 BRPM | BODY MASS INDEX: 33 KG/M2

## 2025-07-24 DIAGNOSIS — N95.8 GENITOURINARY SYNDROME OF MENOPAUSE: ICD-10-CM

## 2025-07-24 DIAGNOSIS — R30.0 DYSURIA: Primary | ICD-10-CM

## 2025-07-24 LAB
ALBUMIN UR-MCNC: NEGATIVE MG/DL
APPEARANCE UR: CLEAR
BILIRUB UR QL STRIP: NEGATIVE
COLOR UR AUTO: YELLOW
GLUCOSE UR STRIP-MCNC: NEGATIVE MG/DL
HGB UR QL STRIP: ABNORMAL
KETONES UR STRIP-MCNC: NEGATIVE MG/DL
LEUKOCYTE ESTERASE UR QL STRIP: NEGATIVE
NITRATE UR QL: POSITIVE
PH UR STRIP: 7 [PH] (ref 5–7)
RBC #/AREA URNS AUTO: NORMAL /HPF
SP GR UR STRIP: 1.01 (ref 1–1.03)
UROBILINOGEN UR STRIP-ACNC: 0.2 E.U./DL
WBC #/AREA URNS AUTO: NORMAL /HPF

## 2025-07-24 RX ORDER — ESTRADIOL 0.1 MG/G
2 CREAM VAGINAL
Qty: 42.5 G | Refills: 3 | Status: SHIPPED | OUTPATIENT
Start: 2025-07-25

## 2025-07-24 NOTE — PROGRESS NOTES
"  {PROVIDER CHARTING PREFERENCE:516096}    Subjective   Charu is a 64 year old, presenting for the following health issues:  UTI    UTI    History of Present Illness       Reason for visit:  UTI   She is taking medications regularly.        Genitourinary - Female  Onset/Duration: 5+ days  Description:   Painful urination (Dysuria): YES           Frequency: YES. Some urgency. fr  Blood in urine (Hematuria): No  Delay in urine (Hesitency): YES  Intensity: moderate  Progression of Symptoms:  {.:499735}  Accompanying Signs & Symptoms:  Fever/chills: No  Flank pain: No  Nausea and vomiting: YES- nausea  Vaginal symptoms: none  Abdominal/Pelvic Pain: lower pelvic pain  History:   History of frequent UTI s: No  History of kidney stones: No  Sexually Active: YES  Possibility of pregnancy: No  Precipitating or alleviating factors: None  Therapies tried and outcome:  none   {additonal problems for provider to add (Optional):120261}    {ROS Picklists (Optional):153678}      Objective    /74   Pulse 72   Temp 97.6  F (36.4  C) (Temporal)   Resp 16   Ht 1.626 m (5' 4\")   Wt 87.7 kg (193 lb 4.8 oz)   LMP 06/27/2007   BMI 33.18 kg/m    Body mass index is 33.18 kg/m .  Physical Exam   {Exam List (Optional):274915}    {Diagnostic Test Results (Optional):810218}        Signed Electronically by: Krish Shannon MD  {Email feedback regarding this note to [email or web address]   :[id number]}  "

## (undated) DEVICE — ESU PENCIL W/SMOKE EVAC CVPLP2000

## (undated) DEVICE — SYR 10ML FINGER CONTROL W/O NDL 309695

## (undated) DEVICE — SUCTION TIP YANKAUER W/O VENT K86

## (undated) DEVICE — GLOVE PROTEXIS BLUE W/NEU-THERA 6.5  2D73EB65

## (undated) DEVICE — DRAPE MINOR PROCEDURE LAP 29496

## (undated) DEVICE — NDL 19GA 1.5"

## (undated) DEVICE — SU SILK 2-0 TIE 24" SA75H

## (undated) DEVICE — GLOVE PROTEXIS MICRO 6.5  2D73PM65

## (undated) DEVICE — LINEN TOWEL PACK X5 5464

## (undated) DEVICE — SU VICRYL 2-0 SH 27" J317H

## (undated) DEVICE — NDL 27GA 1.25" 305136

## (undated) DEVICE — SYR EAR BULB 3OZ 0035830

## (undated) DEVICE — SOL WATER IRRIG 1000ML BOTTLE 2F7114

## (undated) DEVICE — SUCTION CANISTER MEDIVAC LINER 3000ML W/LID 65651-530

## (undated) DEVICE — SYR 10ML SLIP TIP W/O NDL

## (undated) DEVICE — PACK MINOR SBA15MIFSE

## (undated) DEVICE — DRSG KERLIX FLUFFS X5

## (undated) DEVICE — ESU ELEC NDL 1" E1552

## (undated) DEVICE — SPONGE BALL KERLIX ROUND XL W/O STRING LATEX 4935

## (undated) DEVICE — GLOVE PROTEXIS W/NEU-THERA 6.0  2D73TE60

## (undated) DEVICE — VESSEL LOOPS RED MAXI

## (undated) RX ORDER — PROPOFOL 10 MG/ML
INJECTION, EMULSION INTRAVENOUS
Status: DISPENSED
Start: 2021-03-11

## (undated) RX ORDER — ONDANSETRON 2 MG/ML
INJECTION INTRAMUSCULAR; INTRAVENOUS
Status: DISPENSED
Start: 2021-03-11

## (undated) RX ORDER — LIDOCAINE HYDROCHLORIDE 10 MG/ML
INJECTION, SOLUTION EPIDURAL; INFILTRATION; INTRACAUDAL; PERINEURAL
Status: DISPENSED
Start: 2021-03-11

## (undated) RX ORDER — ACETAMINOPHEN 325 MG/1
TABLET ORAL
Status: DISPENSED
Start: 2021-03-11

## (undated) RX ORDER — OXYCODONE HYDROCHLORIDE 5 MG/1
TABLET ORAL
Status: DISPENSED
Start: 2021-03-11

## (undated) RX ORDER — LIDOCAINE HYDROCHLORIDE 20 MG/ML
INJECTION, SOLUTION EPIDURAL; INFILTRATION; INTRACAUDAL; PERINEURAL
Status: DISPENSED
Start: 2021-03-11

## (undated) RX ORDER — BUPIVACAINE HYDROCHLORIDE AND EPINEPHRINE 2.5; 5 MG/ML; UG/ML
INJECTION, SOLUTION EPIDURAL; INFILTRATION; INTRACAUDAL; PERINEURAL
Status: DISPENSED
Start: 2021-03-11

## (undated) RX ORDER — FENTANYL CITRATE 50 UG/ML
INJECTION, SOLUTION INTRAMUSCULAR; INTRAVENOUS
Status: DISPENSED
Start: 2021-03-11